# Patient Record
Sex: FEMALE | Race: WHITE | NOT HISPANIC OR LATINO | URBAN - METROPOLITAN AREA
[De-identification: names, ages, dates, MRNs, and addresses within clinical notes are randomized per-mention and may not be internally consistent; named-entity substitution may affect disease eponyms.]

---

## 2017-01-09 ENCOUNTER — COMMUNICATION - HEALTHEAST (OUTPATIENT)
Dept: INTERNAL MEDICINE | Facility: CLINIC | Age: 77
End: 2017-01-09

## 2017-01-09 DIAGNOSIS — F41.1 ANXIETY STATE: ICD-10-CM

## 2017-01-13 ENCOUNTER — COMMUNICATION - HEALTHEAST (OUTPATIENT)
Dept: INTERNAL MEDICINE | Facility: CLINIC | Age: 77
End: 2017-01-13

## 2017-01-13 DIAGNOSIS — R07.9 CHEST PAIN: ICD-10-CM

## 2017-01-26 ENCOUNTER — OFFICE VISIT - HEALTHEAST (OUTPATIENT)
Dept: INTERNAL MEDICINE | Facility: CLINIC | Age: 77
End: 2017-01-26

## 2017-01-26 DIAGNOSIS — F41.1 ANXIETY STATE: ICD-10-CM

## 2017-01-26 DIAGNOSIS — I89.0 LYMPHEDEMA: ICD-10-CM

## 2017-01-26 DIAGNOSIS — E11.9 CONTROLLED TYPE 2 DIABETES MELLITUS WITHOUT COMPLICATION, WITHOUT LONG-TERM CURRENT USE OF INSULIN (H): ICD-10-CM

## 2017-01-26 DIAGNOSIS — E78.00 PURE HYPERCHOLESTEROLEMIA: ICD-10-CM

## 2017-01-26 DIAGNOSIS — I21.4 NSTEMI (NON-ST ELEVATED MYOCARDIAL INFARCTION) (H): ICD-10-CM

## 2017-01-26 DIAGNOSIS — R10.9 INTERMITTENT ABDOMINAL PAIN: ICD-10-CM

## 2017-01-26 DIAGNOSIS — I10 ESSENTIAL HYPERTENSION WITH GOAL BLOOD PRESSURE LESS THAN 140/90: ICD-10-CM

## 2017-01-26 LAB — HBA1C MFR BLD: 6.6 % (ref 3.5–6)

## 2017-01-26 ASSESSMENT — MIFFLIN-ST. JEOR: SCORE: 1359.22

## 2017-01-31 ENCOUNTER — COMMUNICATION - HEALTHEAST (OUTPATIENT)
Dept: INTERNAL MEDICINE | Facility: CLINIC | Age: 77
End: 2017-01-31

## 2017-02-17 ENCOUNTER — COMMUNICATION - HEALTHEAST (OUTPATIENT)
Dept: INTERNAL MEDICINE | Facility: CLINIC | Age: 77
End: 2017-02-17

## 2017-02-17 DIAGNOSIS — F41.1 ANXIETY STATE: ICD-10-CM

## 2017-02-19 ENCOUNTER — COMMUNICATION - HEALTHEAST (OUTPATIENT)
Dept: INTERNAL MEDICINE | Facility: CLINIC | Age: 77
End: 2017-02-19

## 2017-02-19 DIAGNOSIS — F41.1 ANXIETY STATE: ICD-10-CM

## 2017-03-06 ENCOUNTER — COMMUNICATION - HEALTHEAST (OUTPATIENT)
Dept: INTENSIVE CARE | Facility: CLINIC | Age: 77
End: 2017-03-06

## 2017-03-06 DIAGNOSIS — I10 UNSPECIFIED ESSENTIAL HYPERTENSION: ICD-10-CM

## 2017-03-06 DIAGNOSIS — I21.4 NSTEMI (NON-ST ELEVATED MYOCARDIAL INFARCTION) (H): ICD-10-CM

## 2017-03-13 ENCOUNTER — OFFICE VISIT - HEALTHEAST (OUTPATIENT)
Dept: INTERNAL MEDICINE | Facility: CLINIC | Age: 77
End: 2017-03-13

## 2017-03-13 ENCOUNTER — COMMUNICATION - HEALTHEAST (OUTPATIENT)
Dept: SCHEDULING | Facility: CLINIC | Age: 77
End: 2017-03-13

## 2017-03-13 ENCOUNTER — COMMUNICATION - HEALTHEAST (OUTPATIENT)
Dept: INTERNAL MEDICINE | Facility: CLINIC | Age: 77
End: 2017-03-13

## 2017-03-13 DIAGNOSIS — R06.02 SOB (SHORTNESS OF BREATH): ICD-10-CM

## 2017-03-13 DIAGNOSIS — E11.9 CONTROLLED TYPE 2 DIABETES MELLITUS WITHOUT COMPLICATION, WITHOUT LONG-TERM CURRENT USE OF INSULIN (H): ICD-10-CM

## 2017-03-13 DIAGNOSIS — F41.1 ANXIETY STATE: ICD-10-CM

## 2017-03-14 LAB
ATRIAL RATE - MUSE: 79 BPM
DIASTOLIC BLOOD PRESSURE - MUSE: NORMAL MMHG
INTERPRETATION ECG - MUSE: NORMAL
P AXIS - MUSE: 51 DEGREES
PR INTERVAL - MUSE: 148 MS
QRS DURATION - MUSE: 76 MS
QT - MUSE: 362 MS
QTC - MUSE: 415 MS
R AXIS - MUSE: 39 DEGREES
SYSTOLIC BLOOD PRESSURE - MUSE: NORMAL MMHG
T AXIS - MUSE: 141 DEGREES
VENTRICULAR RATE- MUSE: 79 BPM

## 2017-03-17 ENCOUNTER — COMMUNICATION - HEALTHEAST (OUTPATIENT)
Dept: INTERNAL MEDICINE | Facility: CLINIC | Age: 77
End: 2017-03-17

## 2017-03-17 ENCOUNTER — COMMUNICATION - HEALTHEAST (OUTPATIENT)
Dept: SCHEDULING | Facility: CLINIC | Age: 77
End: 2017-03-17

## 2017-03-17 DIAGNOSIS — F41.9 ANXIETY: ICD-10-CM

## 2017-03-30 ENCOUNTER — COMMUNICATION - HEALTHEAST (OUTPATIENT)
Dept: INTERNAL MEDICINE | Facility: CLINIC | Age: 77
End: 2017-03-30

## 2017-03-30 DIAGNOSIS — E11.9 TYPE 2 DIABETES MELLITUS (H): ICD-10-CM

## 2017-04-11 ENCOUNTER — COMMUNICATION - HEALTHEAST (OUTPATIENT)
Dept: INTERNAL MEDICINE | Facility: CLINIC | Age: 77
End: 2017-04-11

## 2017-04-11 DIAGNOSIS — M54.9 BACK PAIN: ICD-10-CM

## 2017-04-19 ENCOUNTER — COMMUNICATION - HEALTHEAST (OUTPATIENT)
Dept: INTERNAL MEDICINE | Facility: CLINIC | Age: 77
End: 2017-04-19

## 2017-04-19 DIAGNOSIS — F41.1 ANXIETY STATE: ICD-10-CM

## 2017-05-13 ENCOUNTER — COMMUNICATION - HEALTHEAST (OUTPATIENT)
Dept: INTERNAL MEDICINE | Facility: CLINIC | Age: 77
End: 2017-05-13

## 2017-05-13 DIAGNOSIS — E87.6 HYPOKALEMIA DUE TO LOSS OF POTASSIUM: ICD-10-CM

## 2017-05-13 DIAGNOSIS — I10 UNSPECIFIED ESSENTIAL HYPERTENSION: ICD-10-CM

## 2017-05-25 ENCOUNTER — COMMUNICATION - HEALTHEAST (OUTPATIENT)
Dept: INTERNAL MEDICINE | Facility: CLINIC | Age: 77
End: 2017-05-25

## 2017-05-25 DIAGNOSIS — F41.1 ANXIETY STATE: ICD-10-CM

## 2017-06-09 ENCOUNTER — COMMUNICATION - HEALTHEAST (OUTPATIENT)
Dept: INTERNAL MEDICINE | Facility: CLINIC | Age: 77
End: 2017-06-09

## 2017-06-09 DIAGNOSIS — F41.9 ANXIETY: ICD-10-CM

## 2017-07-07 ENCOUNTER — COMMUNICATION - HEALTHEAST (OUTPATIENT)
Dept: INTERNAL MEDICINE | Facility: CLINIC | Age: 77
End: 2017-07-07

## 2017-07-07 ENCOUNTER — OFFICE VISIT - HEALTHEAST (OUTPATIENT)
Dept: INTERNAL MEDICINE | Facility: CLINIC | Age: 77
End: 2017-07-07

## 2017-07-07 DIAGNOSIS — M15.0 PRIMARY OSTEOARTHRITIS INVOLVING MULTIPLE JOINTS: ICD-10-CM

## 2017-07-07 DIAGNOSIS — R53.83 FATIGUE, UNSPECIFIED TYPE: ICD-10-CM

## 2017-07-07 DIAGNOSIS — E11.9 TYPE 2 DIABETES MELLITUS (H): ICD-10-CM

## 2017-07-07 DIAGNOSIS — B37.0 THRUSH OF MOUTH AND ESOPHAGUS (H): ICD-10-CM

## 2017-07-07 DIAGNOSIS — F41.1 ANXIETY STATE: ICD-10-CM

## 2017-07-07 DIAGNOSIS — B37.81 THRUSH OF MOUTH AND ESOPHAGUS (H): ICD-10-CM

## 2017-07-07 DIAGNOSIS — E11.9 CONTROLLED TYPE 2 DIABETES MELLITUS WITHOUT COMPLICATION, WITHOUT LONG-TERM CURRENT USE OF INSULIN (H): ICD-10-CM

## 2017-07-07 DIAGNOSIS — D64.9 ANEMIA, UNSPECIFIED: ICD-10-CM

## 2017-07-10 ENCOUNTER — COMMUNICATION - HEALTHEAST (OUTPATIENT)
Dept: INTERNAL MEDICINE | Facility: CLINIC | Age: 77
End: 2017-07-10

## 2017-07-10 LAB — HBA1C MFR BLD: 7.3 % (ref 3.5–6)

## 2017-07-11 ENCOUNTER — COMMUNICATION - HEALTHEAST (OUTPATIENT)
Dept: INTERNAL MEDICINE | Facility: CLINIC | Age: 77
End: 2017-07-11

## 2017-07-29 ASSESSMENT — MIFFLIN-ST. JEOR
SCORE: 1315.44
SCORE: 1302.97

## 2017-07-31 ENCOUNTER — SURGERY - HEALTHEAST (OUTPATIENT)
Dept: CARDIOLOGY | Facility: CLINIC | Age: 77
End: 2017-07-31

## 2017-08-01 ENCOUNTER — COMMUNICATION - HEALTHEAST (OUTPATIENT)
Dept: INTERNAL MEDICINE | Facility: CLINIC | Age: 77
End: 2017-08-01

## 2017-08-01 ASSESSMENT — MIFFLIN-ST. JEOR: SCORE: 1286.19

## 2017-08-03 ENCOUNTER — COMMUNICATION - HEALTHEAST (OUTPATIENT)
Dept: INTERNAL MEDICINE | Facility: CLINIC | Age: 77
End: 2017-08-03

## 2017-08-03 DIAGNOSIS — F41.1 ANXIETY STATE: ICD-10-CM

## 2017-08-08 ENCOUNTER — COMMUNICATION - HEALTHEAST (OUTPATIENT)
Dept: INTERNAL MEDICINE | Facility: CLINIC | Age: 77
End: 2017-08-08

## 2017-08-08 DIAGNOSIS — K21.00 GASTROESOPHAGEAL REFLUX DISEASE WITH ESOPHAGITIS: ICD-10-CM

## 2017-08-17 ENCOUNTER — OFFICE VISIT - HEALTHEAST (OUTPATIENT)
Dept: INTERNAL MEDICINE | Facility: CLINIC | Age: 77
End: 2017-08-17

## 2017-08-17 DIAGNOSIS — I25.84 CORONARY ATHEROSCLEROSIS DUE TO CALCIFIED CORONARY LESION: ICD-10-CM

## 2017-08-17 DIAGNOSIS — E11.9 CONTROLLED TYPE 2 DIABETES MELLITUS WITHOUT COMPLICATION, WITHOUT LONG-TERM CURRENT USE OF INSULIN (H): ICD-10-CM

## 2017-08-17 DIAGNOSIS — C64.2: ICD-10-CM

## 2017-08-17 DIAGNOSIS — R06.00 DYSPNEA, UNSPECIFIED TYPE: ICD-10-CM

## 2017-08-17 DIAGNOSIS — D50.0 IRON DEFICIENCY ANEMIA DUE TO CHRONIC BLOOD LOSS: ICD-10-CM

## 2017-08-17 DIAGNOSIS — I10 ESSENTIAL HYPERTENSION WITH GOAL BLOOD PRESSURE LESS THAN 140/90: ICD-10-CM

## 2017-08-17 DIAGNOSIS — K21.00 GASTROESOPHAGEAL REFLUX DISEASE WITH ESOPHAGITIS: ICD-10-CM

## 2017-08-17 DIAGNOSIS — I25.10 CORONARY ATHEROSCLEROSIS DUE TO CALCIFIED CORONARY LESION: ICD-10-CM

## 2017-08-17 DIAGNOSIS — E79.0 ELEVATED URIC ACID IN BLOOD: ICD-10-CM

## 2017-08-22 ENCOUNTER — COMMUNICATION - HEALTHEAST (OUTPATIENT)
Dept: INTERNAL MEDICINE | Facility: CLINIC | Age: 77
End: 2017-08-22

## 2017-08-31 ENCOUNTER — COMMUNICATION - HEALTHEAST (OUTPATIENT)
Dept: INTERNAL MEDICINE | Facility: CLINIC | Age: 77
End: 2017-08-31

## 2017-09-12 ENCOUNTER — COMMUNICATION - HEALTHEAST (OUTPATIENT)
Dept: INTERNAL MEDICINE | Facility: CLINIC | Age: 77
End: 2017-09-12

## 2017-09-12 ENCOUNTER — RECORDS - HEALTHEAST (OUTPATIENT)
Dept: ADMINISTRATIVE | Facility: OTHER | Age: 77
End: 2017-09-12

## 2017-09-13 ENCOUNTER — RECORDS - HEALTHEAST (OUTPATIENT)
Dept: ADMINISTRATIVE | Facility: OTHER | Age: 77
End: 2017-09-13

## 2017-09-14 ENCOUNTER — OFFICE VISIT - HEALTHEAST (OUTPATIENT)
Dept: INTERNAL MEDICINE | Facility: CLINIC | Age: 77
End: 2017-09-14

## 2017-09-14 DIAGNOSIS — Z00.00 HEALTH CARE MAINTENANCE: ICD-10-CM

## 2017-09-14 DIAGNOSIS — C64.2: ICD-10-CM

## 2017-09-14 DIAGNOSIS — E11.9 CONTROLLED TYPE 2 DIABETES MELLITUS WITHOUT COMPLICATION, WITHOUT LONG-TERM CURRENT USE OF INSULIN (H): ICD-10-CM

## 2017-09-14 DIAGNOSIS — K21.00 GASTROESOPHAGEAL REFLUX DISEASE WITH ESOPHAGITIS: ICD-10-CM

## 2017-09-14 DIAGNOSIS — I25.84 CORONARY ATHEROSCLEROSIS DUE TO CALCIFIED CORONARY LESION: ICD-10-CM

## 2017-09-14 DIAGNOSIS — I25.10 CORONARY ATHEROSCLEROSIS DUE TO CALCIFIED CORONARY LESION: ICD-10-CM

## 2017-09-14 DIAGNOSIS — Z01.818 PRE-OPERATIVE EXAMINATION FOR INTERNAL MEDICINE: ICD-10-CM

## 2017-09-14 ASSESSMENT — MIFFLIN-ST. JEOR: SCORE: 1329.39

## 2017-09-15 ENCOUNTER — COMMUNICATION - HEALTHEAST (OUTPATIENT)
Dept: INTERNAL MEDICINE | Facility: CLINIC | Age: 77
End: 2017-09-15

## 2017-09-19 ENCOUNTER — HOSPITAL ENCOUNTER (OUTPATIENT)
Dept: CT IMAGING | Facility: HOSPITAL | Age: 77
Discharge: HOME | End: 2017-09-19
Attending: UROLOGY

## 2017-09-19 ENCOUNTER — HOSPITAL ENCOUNTER (OUTPATIENT)
Dept: INTERVENTIONAL RADIOLOGY/VASCULAR | Facility: HOSPITAL | Age: 77
Discharge: HOME | End: 2017-09-19
Attending: UROLOGY

## 2017-09-19 DIAGNOSIS — N28.89 RENAL MASS: ICD-10-CM

## 2017-09-19 ASSESSMENT — MIFFLIN-ST. JEOR: SCORE: 1328.49

## 2017-09-20 LAB
LAB AP CHARGES (HE HISTORICAL CONVERSION): ABNORMAL
LAB AP INITIAL CYTO EVAL (HE HISTORICAL CONVERSION): ABNORMAL
LAB MED GENERAL PATH INTERP (HE HISTORICAL CONVERSION): ABNORMAL
PATH REPORT.COMMENTS IMP SPEC: ABNORMAL
PATH REPORT.COMMENTS IMP SPEC: ABNORMAL
PATH REPORT.FINAL DX SPEC: ABNORMAL
PATH REPORT.MICROSCOPIC SPEC OTHER STN: ABNORMAL
PATH REPORT.RELEVANT HX SPEC: ABNORMAL
SPECIMEN DESCRIPTION: ABNORMAL

## 2017-09-21 ENCOUNTER — COMMUNICATION - HEALTHEAST (OUTPATIENT)
Dept: INTERNAL MEDICINE | Facility: CLINIC | Age: 77
End: 2017-09-21

## 2017-09-21 DIAGNOSIS — F41.1 ANXIETY STATE: ICD-10-CM

## 2017-09-24 ENCOUNTER — COMMUNICATION - HEALTHEAST (OUTPATIENT)
Dept: INTERNAL MEDICINE | Facility: CLINIC | Age: 77
End: 2017-09-24

## 2017-09-24 DIAGNOSIS — E11.9 TYPE 2 DIABETES MELLITUS (H): ICD-10-CM

## 2017-09-29 ENCOUNTER — RECORDS - HEALTHEAST (OUTPATIENT)
Dept: ADMINISTRATIVE | Facility: OTHER | Age: 77
End: 2017-09-29

## 2017-10-03 ENCOUNTER — RECORDS - HEALTHEAST (OUTPATIENT)
Dept: ADMINISTRATIVE | Facility: OTHER | Age: 77
End: 2017-10-03

## 2017-10-03 ENCOUNTER — COMMUNICATION - HEALTHEAST (OUTPATIENT)
Dept: INTERNAL MEDICINE | Facility: CLINIC | Age: 77
End: 2017-10-03

## 2017-10-04 ENCOUNTER — RECORDS - HEALTHEAST (OUTPATIENT)
Dept: ADMINISTRATIVE | Facility: OTHER | Age: 77
End: 2017-10-04

## 2017-10-12 ENCOUNTER — COMMUNICATION - HEALTHEAST (OUTPATIENT)
Dept: INTERNAL MEDICINE | Facility: CLINIC | Age: 77
End: 2017-10-12

## 2017-10-13 ENCOUNTER — COMMUNICATION - HEALTHEAST (OUTPATIENT)
Dept: INTERNAL MEDICINE | Facility: CLINIC | Age: 77
End: 2017-10-13

## 2017-10-13 DIAGNOSIS — J45.909 ASTHMA: ICD-10-CM

## 2017-10-16 ENCOUNTER — COMMUNICATION - HEALTHEAST (OUTPATIENT)
Dept: INTERNAL MEDICINE | Facility: CLINIC | Age: 77
End: 2017-10-16

## 2017-10-24 ENCOUNTER — OFFICE VISIT - HEALTHEAST (OUTPATIENT)
Dept: INTERNAL MEDICINE | Facility: CLINIC | Age: 77
End: 2017-10-24

## 2017-10-24 DIAGNOSIS — C64.2 RENAL CELL CANCER, LEFT (H): ICD-10-CM

## 2017-10-24 DIAGNOSIS — E11.9 CONTROLLED TYPE 2 DIABETES MELLITUS WITHOUT COMPLICATION, WITHOUT LONG-TERM CURRENT USE OF INSULIN (H): ICD-10-CM

## 2017-10-24 LAB — HBA1C MFR BLD: 7.3 % (ref 3.5–6)

## 2017-10-24 ASSESSMENT — MIFFLIN-ST. JEOR: SCORE: 1319.7

## 2017-10-25 ENCOUNTER — COMMUNICATION - HEALTHEAST (OUTPATIENT)
Dept: INTERNAL MEDICINE | Facility: CLINIC | Age: 77
End: 2017-10-25

## 2017-11-05 ENCOUNTER — COMMUNICATION - HEALTHEAST (OUTPATIENT)
Dept: INTERNAL MEDICINE | Facility: CLINIC | Age: 77
End: 2017-11-05

## 2017-11-05 DIAGNOSIS — E11.9 TYPE 2 DIABETES MELLITUS (H): ICD-10-CM

## 2017-11-22 ENCOUNTER — COMMUNICATION - HEALTHEAST (OUTPATIENT)
Dept: INTERNAL MEDICINE | Facility: CLINIC | Age: 77
End: 2017-11-22

## 2017-11-28 ENCOUNTER — OFFICE VISIT - HEALTHEAST (OUTPATIENT)
Dept: INTERNAL MEDICINE | Facility: CLINIC | Age: 77
End: 2017-11-28
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
## 2017-11-28 DIAGNOSIS — S01.81XA LACERATION OF FOREHEAD: ICD-10-CM

## 2017-12-04 ENCOUNTER — COMMUNICATION - HEALTHEAST (OUTPATIENT)
Dept: INTERNAL MEDICINE | Facility: CLINIC | Age: 77
End: 2017-12-04

## 2017-12-06 ENCOUNTER — OFFICE VISIT - HEALTHEAST (OUTPATIENT)
Dept: INTERNAL MEDICINE | Facility: CLINIC | Age: 77
End: 2017-12-06

## 2017-12-06 DIAGNOSIS — E11.9 CONTROLLED TYPE 2 DIABETES MELLITUS WITHOUT COMPLICATION, WITHOUT LONG-TERM CURRENT USE OF INSULIN (H): ICD-10-CM

## 2017-12-06 DIAGNOSIS — S31.809A WOUND OF BUTTOCK, UNSPECIFIED LATERALITY, INITIAL ENCOUNTER: ICD-10-CM

## 2017-12-06 ASSESSMENT — MIFFLIN-ST. JEOR: SCORE: 1314.08

## 2017-12-13 ENCOUNTER — COMMUNICATION - HEALTHEAST (OUTPATIENT)
Dept: INTERNAL MEDICINE | Facility: CLINIC | Age: 77
End: 2017-12-13

## 2017-12-13 DIAGNOSIS — E87.6 HYPOKALEMIA DUE TO LOSS OF POTASSIUM: ICD-10-CM

## 2017-12-13 DIAGNOSIS — I10 ESSENTIAL HYPERTENSION: ICD-10-CM

## 2018-01-03 ENCOUNTER — HOSPITAL ENCOUNTER (OUTPATIENT)
Dept: CT IMAGING | Facility: CLINIC | Age: 78
Discharge: HOME | End: 2018-01-03
Attending: UROLOGY

## 2018-01-03 DIAGNOSIS — C64.2 MALIGNANT NEOPLASM OF LEFT KIDNEY, EXCEPT RENAL PELVIS (H): ICD-10-CM

## 2018-01-03 LAB
CREAT BLD-MCNC: 0.9 MG/DL
POC GFR AMER AF HE - HISTORICAL: >60 ML/MIN/1.73M2
POC GFR NON AMER AF HE - HISTORICAL: 60 ML/MIN/1.73M2

## 2018-01-06 ENCOUNTER — COMMUNICATION - HEALTHEAST (OUTPATIENT)
Dept: INTERNAL MEDICINE | Facility: CLINIC | Age: 78
End: 2018-01-06

## 2018-01-26 ENCOUNTER — COMMUNICATION - HEALTHEAST (OUTPATIENT)
Dept: INTERNAL MEDICINE | Facility: CLINIC | Age: 78
End: 2018-01-26

## 2018-01-30 ENCOUNTER — RECORDS - HEALTHEAST (OUTPATIENT)
Dept: ADMINISTRATIVE | Facility: OTHER | Age: 78
End: 2018-01-30

## 2018-02-07 ENCOUNTER — COMMUNICATION - HEALTHEAST (OUTPATIENT)
Dept: INTERNAL MEDICINE | Facility: CLINIC | Age: 78
End: 2018-02-07

## 2018-02-07 ENCOUNTER — RECORDS - HEALTHEAST (OUTPATIENT)
Dept: ADMINISTRATIVE | Facility: OTHER | Age: 78
End: 2018-02-07

## 2018-02-16 ENCOUNTER — COMMUNICATION - HEALTHEAST (OUTPATIENT)
Dept: INTERNAL MEDICINE | Facility: CLINIC | Age: 78
End: 2018-02-16

## 2018-03-02 ENCOUNTER — OFFICE VISIT - HEALTHEAST (OUTPATIENT)
Dept: INTERNAL MEDICINE | Facility: CLINIC | Age: 78
End: 2018-03-02

## 2018-03-02 DIAGNOSIS — D50.0 IRON DEFICIENCY ANEMIA DUE TO CHRONIC BLOOD LOSS: ICD-10-CM

## 2018-03-02 DIAGNOSIS — K21.00 GASTROESOPHAGEAL REFLUX DISEASE WITH ESOPHAGITIS: ICD-10-CM

## 2018-03-02 DIAGNOSIS — E11.9 CONTROLLED TYPE 2 DIABETES MELLITUS WITHOUT COMPLICATION, WITHOUT LONG-TERM CURRENT USE OF INSULIN (H): ICD-10-CM

## 2018-03-02 DIAGNOSIS — C64.2 RENAL CELL CANCER, LEFT (H): ICD-10-CM

## 2018-03-02 DIAGNOSIS — I25.84 CORONARY ATHEROSCLEROSIS DUE TO CALCIFIED CORONARY LESION: ICD-10-CM

## 2018-03-02 DIAGNOSIS — I10 ESSENTIAL HYPERTENSION WITH GOAL BLOOD PRESSURE LESS THAN 140/90: ICD-10-CM

## 2018-03-02 DIAGNOSIS — I25.10 CORONARY ATHEROSCLEROSIS DUE TO CALCIFIED CORONARY LESION: ICD-10-CM

## 2018-03-02 LAB
ALBUMIN SERPL-MCNC: 3.3 G/DL (ref 3.5–5)
ALP SERPL-CCNC: 135 U/L (ref 45–120)
ALT SERPL W P-5'-P-CCNC: 12 U/L (ref 0–45)
ANION GAP SERPL CALCULATED.3IONS-SCNC: 11 MMOL/L (ref 5–18)
AST SERPL W P-5'-P-CCNC: 11 U/L (ref 0–40)
BILIRUB SERPL-MCNC: 0.3 MG/DL (ref 0–1)
BUN SERPL-MCNC: 11 MG/DL (ref 8–28)
CALCIUM SERPL-MCNC: 9.2 MG/DL (ref 8.5–10.5)
CHLORIDE BLD-SCNC: 103 MMOL/L (ref 98–107)
CO2 SERPL-SCNC: 27 MMOL/L (ref 22–31)
CREAT SERPL-MCNC: 0.82 MG/DL (ref 0.6–1.1)
ERYTHROCYTE [DISTWIDTH] IN BLOOD BY AUTOMATED COUNT: 13.8 % (ref 11–14.5)
GFR SERPL CREATININE-BSD FRML MDRD: >60 ML/MIN/1.73M2
GLUCOSE BLD-MCNC: 188 MG/DL (ref 70–125)
HBA1C MFR BLD: 8.6 % (ref 3.5–6)
HCT VFR BLD AUTO: 40 % (ref 35–47)
HGB BLD-MCNC: 12.9 G/DL (ref 12–16)
MCH RBC QN AUTO: 27.5 PG (ref 27–34)
MCHC RBC AUTO-ENTMCNC: 32.4 G/DL (ref 32–36)
MCV RBC AUTO: 85 FL (ref 80–100)
PLATELET # BLD AUTO: 253 THOU/UL (ref 140–440)
PMV BLD AUTO: 7.3 FL (ref 7–10)
POTASSIUM BLD-SCNC: 4.1 MMOL/L (ref 3.5–5)
PROT SERPL-MCNC: 5.8 G/DL (ref 6–8)
RBC # BLD AUTO: 4.7 MILL/UL (ref 3.8–5.4)
SODIUM SERPL-SCNC: 141 MMOL/L (ref 136–145)
WBC: 7.5 THOU/UL (ref 4–11)

## 2018-03-06 ENCOUNTER — COMMUNICATION - HEALTHEAST (OUTPATIENT)
Dept: INTERNAL MEDICINE | Facility: CLINIC | Age: 78
End: 2018-03-06

## 2018-03-22 ENCOUNTER — COMMUNICATION - HEALTHEAST (OUTPATIENT)
Dept: INTERNAL MEDICINE | Facility: CLINIC | Age: 78
End: 2018-03-22

## 2018-04-09 ENCOUNTER — OFFICE VISIT - HEALTHEAST (OUTPATIENT)
Dept: NURSING | Facility: CLINIC | Age: 78
End: 2018-04-09

## 2018-04-09 DIAGNOSIS — E11.9 TYPE 2 DIABETES MELLITUS WITHOUT COMPLICATION, WITHOUT LONG-TERM CURRENT USE OF INSULIN (H): ICD-10-CM

## 2018-04-09 DIAGNOSIS — F41.1 ANXIETY STATE: ICD-10-CM

## 2018-04-09 DIAGNOSIS — I25.84 CORONARY ATHEROSCLEROSIS DUE TO CALCIFIED CORONARY LESION: ICD-10-CM

## 2018-04-09 DIAGNOSIS — Z00.00 PREVENTATIVE HEALTH CARE: ICD-10-CM

## 2018-04-09 DIAGNOSIS — M19.90 ARTHRITIS: ICD-10-CM

## 2018-04-09 DIAGNOSIS — D63.8 ANEMIA DUE TO CHRONIC ILLNESS: ICD-10-CM

## 2018-04-09 DIAGNOSIS — E79.0 ELEVATED URIC ACID IN BLOOD: ICD-10-CM

## 2018-04-09 DIAGNOSIS — K21.00 GASTROESOPHAGEAL REFLUX DISEASE WITH ESOPHAGITIS: ICD-10-CM

## 2018-04-09 DIAGNOSIS — E11.9 CONTROLLED TYPE 2 DIABETES MELLITUS WITHOUT COMPLICATION, WITHOUT LONG-TERM CURRENT USE OF INSULIN (H): ICD-10-CM

## 2018-04-09 DIAGNOSIS — J44.9 CHRONIC OBSTRUCTIVE PULMONARY DISEASE, UNSPECIFIED COPD TYPE (H): ICD-10-CM

## 2018-04-09 DIAGNOSIS — I25.10 CORONARY ATHEROSCLEROSIS DUE TO CALCIFIED CORONARY LESION: ICD-10-CM

## 2018-04-09 RX ORDER — IBUPROFEN 200 MG
1 CAPSULE ORAL 2 TIMES DAILY
Status: SHIPPED | COMMUNITY
Start: 2018-04-09

## 2018-04-12 ENCOUNTER — COMMUNICATION - HEALTHEAST (OUTPATIENT)
Dept: NURSING | Facility: CLINIC | Age: 78
End: 2018-04-12

## 2018-04-21 ENCOUNTER — COMMUNICATION - HEALTHEAST (OUTPATIENT)
Dept: INTERNAL MEDICINE | Facility: CLINIC | Age: 78
End: 2018-04-21

## 2018-04-26 ENCOUNTER — COMMUNICATION - HEALTHEAST (OUTPATIENT)
Dept: SCHEDULING | Facility: CLINIC | Age: 78
End: 2018-04-26

## 2018-04-28 ENCOUNTER — COMMUNICATION - HEALTHEAST (OUTPATIENT)
Dept: SCHEDULING | Facility: CLINIC | Age: 78
End: 2018-04-28

## 2018-04-30 ENCOUNTER — COMMUNICATION - HEALTHEAST (OUTPATIENT)
Dept: INTERNAL MEDICINE | Facility: CLINIC | Age: 78
End: 2018-04-30

## 2018-04-30 ENCOUNTER — COMMUNICATION - HEALTHEAST (OUTPATIENT)
Dept: SCHEDULING | Facility: CLINIC | Age: 78
End: 2018-04-30

## 2018-05-02 ENCOUNTER — OFFICE VISIT - HEALTHEAST (OUTPATIENT)
Dept: INTERNAL MEDICINE | Facility: CLINIC | Age: 78
End: 2018-05-02

## 2018-05-02 ENCOUNTER — COMMUNICATION - HEALTHEAST (OUTPATIENT)
Dept: INTERNAL MEDICINE | Facility: CLINIC | Age: 78
End: 2018-05-02

## 2018-05-02 DIAGNOSIS — J44.1 CHRONIC OBSTRUCTIVE PULMONARY DISEASE WITH ACUTE EXACERBATION (H): ICD-10-CM

## 2018-05-02 DIAGNOSIS — F41.1 ANXIETY STATE: ICD-10-CM

## 2018-05-02 DIAGNOSIS — E11.9 CONTROLLED TYPE 2 DIABETES MELLITUS WITHOUT COMPLICATION, WITHOUT LONG-TERM CURRENT USE OF INSULIN (H): ICD-10-CM

## 2018-05-02 DIAGNOSIS — I10 ESSENTIAL HYPERTENSION WITH GOAL BLOOD PRESSURE LESS THAN 140/90: ICD-10-CM

## 2018-05-02 LAB — HBA1C MFR BLD: 7.8 % (ref 3.5–6)

## 2018-05-03 ENCOUNTER — COMMUNICATION - HEALTHEAST (OUTPATIENT)
Dept: INTERNAL MEDICINE | Facility: CLINIC | Age: 78
End: 2018-05-03

## 2018-06-05 ENCOUNTER — COMMUNICATION - HEALTHEAST (OUTPATIENT)
Dept: INTERNAL MEDICINE | Facility: CLINIC | Age: 78
End: 2018-06-05

## 2018-06-05 DIAGNOSIS — J45.909 ASTHMA: ICD-10-CM

## 2018-06-08 ENCOUNTER — COMMUNICATION - HEALTHEAST (OUTPATIENT)
Dept: SCHEDULING | Facility: CLINIC | Age: 78
End: 2018-06-08

## 2018-06-12 ENCOUNTER — COMMUNICATION - HEALTHEAST (OUTPATIENT)
Dept: TELEHEALTH | Facility: CLINIC | Age: 78
End: 2018-06-12

## 2018-06-12 ENCOUNTER — COMMUNICATION - HEALTHEAST (OUTPATIENT)
Dept: CARE COORDINATION | Facility: CLINIC | Age: 78
End: 2018-06-12

## 2018-06-13 ENCOUNTER — OFFICE VISIT - HEALTHEAST (OUTPATIENT)
Dept: INTERNAL MEDICINE | Facility: CLINIC | Age: 78
End: 2018-06-13

## 2018-06-13 ENCOUNTER — COMMUNICATION - HEALTHEAST (OUTPATIENT)
Dept: INTERNAL MEDICINE | Facility: CLINIC | Age: 78
End: 2018-06-13

## 2018-06-13 DIAGNOSIS — E11.8 TYPE 2 DIABETES MELLITUS WITH COMPLICATION, WITHOUT LONG-TERM CURRENT USE OF INSULIN (H): ICD-10-CM

## 2018-06-13 DIAGNOSIS — F41.9 ANXIETY: ICD-10-CM

## 2018-06-13 DIAGNOSIS — I50.31 ACUTE DIASTOLIC HEART FAILURE (H): ICD-10-CM

## 2018-06-13 DIAGNOSIS — D63.8 ANEMIA DUE TO CHRONIC ILLNESS: ICD-10-CM

## 2018-06-13 DIAGNOSIS — I10 ESSENTIAL HYPERTENSION WITH GOAL BLOOD PRESSURE LESS THAN 140/90: ICD-10-CM

## 2018-06-13 DIAGNOSIS — I25.810 CAD OF AUTOLOGOUS BYPASS GRAFT: ICD-10-CM

## 2018-06-13 DIAGNOSIS — K21.00 GASTROESOPHAGEAL REFLUX DISEASE WITH ESOPHAGITIS: ICD-10-CM

## 2018-06-13 LAB
ANION GAP SERPL CALCULATED.3IONS-SCNC: 13 MMOL/L (ref 5–18)
BUN SERPL-MCNC: 26 MG/DL (ref 8–28)
CALCIUM SERPL-MCNC: 9.2 MG/DL (ref 8.5–10.5)
CHLORIDE BLD-SCNC: 101 MMOL/L (ref 98–107)
CO2 SERPL-SCNC: 28 MMOL/L (ref 22–31)
CREAT SERPL-MCNC: 1.05 MG/DL (ref 0.6–1.1)
ERYTHROCYTE [DISTWIDTH] IN BLOOD BY AUTOMATED COUNT: 13.2 % (ref 11–14.5)
GFR SERPL CREATININE-BSD FRML MDRD: 50 ML/MIN/1.73M2
GLUCOSE BLD-MCNC: 240 MG/DL (ref 70–125)
HCT VFR BLD AUTO: 37.9 % (ref 35–47)
HGB BLD-MCNC: 12.6 G/DL (ref 12–16)
MCH RBC QN AUTO: 29.1 PG (ref 27–34)
MCHC RBC AUTO-ENTMCNC: 33.4 G/DL (ref 32–36)
MCV RBC AUTO: 87 FL (ref 80–100)
PLATELET # BLD AUTO: 232 THOU/UL (ref 140–440)
PMV BLD AUTO: 7.7 FL (ref 7–10)
POTASSIUM BLD-SCNC: 4.3 MMOL/L (ref 3.5–5)
RBC # BLD AUTO: 4.34 MILL/UL (ref 3.8–5.4)
SODIUM SERPL-SCNC: 142 MMOL/L (ref 136–145)
WBC: 9.5 THOU/UL (ref 4–11)

## 2018-06-14 ENCOUNTER — COMMUNICATION - HEALTHEAST (OUTPATIENT)
Dept: INTERNAL MEDICINE | Facility: CLINIC | Age: 78
End: 2018-06-14

## 2018-06-18 ENCOUNTER — COMMUNICATION - HEALTHEAST (OUTPATIENT)
Dept: INTERNAL MEDICINE | Facility: CLINIC | Age: 78
End: 2018-06-18

## 2018-06-18 DIAGNOSIS — E11.9 DIABETES MELLITUS (H): ICD-10-CM

## 2018-06-18 RX ORDER — GLUCOSAMINE HCL/CHONDROITIN SU 500-400 MG
CAPSULE ORAL
Qty: 300 STRIP | Refills: 3 | Status: SHIPPED | OUTPATIENT
Start: 2018-06-18

## 2018-06-23 ENCOUNTER — COMMUNICATION - HEALTHEAST (OUTPATIENT)
Dept: INTERNAL MEDICINE | Facility: CLINIC | Age: 78
End: 2018-06-23

## 2018-07-11 ENCOUNTER — COMMUNICATION - HEALTHEAST (OUTPATIENT)
Dept: INTERNAL MEDICINE | Facility: CLINIC | Age: 78
End: 2018-07-11

## 2018-07-11 DIAGNOSIS — J45.909 ASTHMA: ICD-10-CM

## 2018-07-13 ENCOUNTER — OFFICE VISIT - HEALTHEAST (OUTPATIENT)
Dept: INTERNAL MEDICINE | Facility: CLINIC | Age: 78
End: 2018-07-13

## 2018-07-13 DIAGNOSIS — J45.40 MODERATE PERSISTENT ASTHMA WITHOUT COMPLICATION: ICD-10-CM

## 2018-07-13 DIAGNOSIS — F41.1 ANXIETY STATE: ICD-10-CM

## 2018-07-13 DIAGNOSIS — E11.8 TYPE 2 DIABETES MELLITUS WITH COMPLICATION, WITHOUT LONG-TERM CURRENT USE OF INSULIN (H): ICD-10-CM

## 2018-07-13 DIAGNOSIS — I10 ESSENTIAL HYPERTENSION WITH GOAL BLOOD PRESSURE LESS THAN 140/90: ICD-10-CM

## 2018-07-16 ENCOUNTER — RECORDS - HEALTHEAST (OUTPATIENT)
Dept: ADMINISTRATIVE | Facility: OTHER | Age: 78
End: 2018-07-16

## 2018-07-31 ENCOUNTER — OFFICE VISIT - HEALTHEAST (OUTPATIENT)
Dept: INTERNAL MEDICINE | Facility: CLINIC | Age: 78
End: 2018-07-31

## 2018-07-31 DIAGNOSIS — F41.1 ANXIETY STATE: ICD-10-CM

## 2018-07-31 DIAGNOSIS — N18.30 TYPE 2 DIABETES MELLITUS WITH STAGE 3 CHRONIC KIDNEY DISEASE, WITHOUT LONG-TERM CURRENT USE OF INSULIN (H): ICD-10-CM

## 2018-07-31 DIAGNOSIS — E11.22 TYPE 2 DIABETES MELLITUS WITH STAGE 3 CHRONIC KIDNEY DISEASE, WITHOUT LONG-TERM CURRENT USE OF INSULIN (H): ICD-10-CM

## 2018-07-31 DIAGNOSIS — F41.9 ANXIETY: ICD-10-CM

## 2018-07-31 DIAGNOSIS — I10 ESSENTIAL HYPERTENSION WITH GOAL BLOOD PRESSURE LESS THAN 140/90: ICD-10-CM

## 2018-08-02 ENCOUNTER — COMMUNICATION - HEALTHEAST (OUTPATIENT)
Dept: INTERNAL MEDICINE | Facility: CLINIC | Age: 78
End: 2018-08-02

## 2018-08-02 DIAGNOSIS — F41.9 ANXIETY: ICD-10-CM

## 2018-08-03 ENCOUNTER — COMMUNICATION - HEALTHEAST (OUTPATIENT)
Dept: SCHEDULING | Facility: CLINIC | Age: 78
End: 2018-08-03

## 2018-08-04 ENCOUNTER — COMMUNICATION - HEALTHEAST (OUTPATIENT)
Dept: SCHEDULING | Facility: CLINIC | Age: 78
End: 2018-08-04

## 2018-08-06 ENCOUNTER — COMMUNICATION - HEALTHEAST (OUTPATIENT)
Dept: INTERNAL MEDICINE | Facility: CLINIC | Age: 78
End: 2018-08-06

## 2018-08-08 ENCOUNTER — COMMUNICATION - HEALTHEAST (OUTPATIENT)
Dept: INTERNAL MEDICINE | Facility: CLINIC | Age: 78
End: 2018-08-08

## 2018-08-12 ENCOUNTER — COMMUNICATION - HEALTHEAST (OUTPATIENT)
Dept: INTERNAL MEDICINE | Facility: CLINIC | Age: 78
End: 2018-08-12

## 2018-08-13 ENCOUNTER — HOSPITAL ENCOUNTER (OUTPATIENT)
Dept: CT IMAGING | Facility: CLINIC | Age: 78
Discharge: HOME | End: 2018-08-13
Attending: UROLOGY

## 2018-08-13 DIAGNOSIS — C64.9 CANCER OF KIDNEY (H): ICD-10-CM

## 2018-08-16 ENCOUNTER — COMMUNICATION - HEALTHEAST (OUTPATIENT)
Dept: INTERNAL MEDICINE | Facility: CLINIC | Age: 78
End: 2018-08-16

## 2018-08-16 ENCOUNTER — OFFICE VISIT - HEALTHEAST (OUTPATIENT)
Dept: INTERNAL MEDICINE | Facility: CLINIC | Age: 78
End: 2018-08-16

## 2018-08-16 ENCOUNTER — OFFICE VISIT - HEALTHEAST (OUTPATIENT)
Dept: PHARMACY | Facility: CLINIC | Age: 78
End: 2018-08-16

## 2018-08-16 DIAGNOSIS — I10 ESSENTIAL HYPERTENSION WITH GOAL BLOOD PRESSURE LESS THAN 140/90: ICD-10-CM

## 2018-08-16 DIAGNOSIS — E11.22 TYPE 2 DIABETES MELLITUS WITH STAGE 3 CHRONIC KIDNEY DISEASE, WITHOUT LONG-TERM CURRENT USE OF INSULIN (H): ICD-10-CM

## 2018-08-16 DIAGNOSIS — E66.01 SEVERE OBESITY (BMI 35.0-35.9 WITH COMORBIDITY) (H): ICD-10-CM

## 2018-08-16 DIAGNOSIS — F41.1 ANXIETY STATE: ICD-10-CM

## 2018-08-16 DIAGNOSIS — Z00.00 ROUTINE HEALTH MAINTENANCE: ICD-10-CM

## 2018-08-16 DIAGNOSIS — J44.1 CHRONIC OBSTRUCTIVE PULMONARY DISEASE WITH ACUTE EXACERBATION (H): ICD-10-CM

## 2018-08-16 DIAGNOSIS — R19.7 DIARRHEA: ICD-10-CM

## 2018-08-16 DIAGNOSIS — N18.30 TYPE 2 DIABETES MELLITUS WITH STAGE 3 CHRONIC KIDNEY DISEASE, WITHOUT LONG-TERM CURRENT USE OF INSULIN (H): ICD-10-CM

## 2018-08-16 DIAGNOSIS — E79.0 ELEVATED URIC ACID IN BLOOD: ICD-10-CM

## 2018-08-16 DIAGNOSIS — F41.9 ANXIETY: ICD-10-CM

## 2018-08-16 DIAGNOSIS — R19.7 DIARRHEA, UNSPECIFIED TYPE: ICD-10-CM

## 2018-08-21 ENCOUNTER — RECORDS - HEALTHEAST (OUTPATIENT)
Dept: ADMINISTRATIVE | Facility: OTHER | Age: 78
End: 2018-08-21

## 2018-08-30 ENCOUNTER — OFFICE VISIT - HEALTHEAST (OUTPATIENT)
Dept: PHARMACY | Facility: CLINIC | Age: 78
End: 2018-08-30

## 2018-08-30 DIAGNOSIS — F41.1 ANXIETY STATE: ICD-10-CM

## 2018-09-13 ENCOUNTER — OFFICE VISIT - HEALTHEAST (OUTPATIENT)
Dept: PODIATRY | Facility: CLINIC | Age: 78
End: 2018-09-13

## 2018-09-13 DIAGNOSIS — B35.1 NAIL FUNGUS: ICD-10-CM

## 2018-09-13 DIAGNOSIS — L60.2 ONYCHAUXIS: ICD-10-CM

## 2018-09-13 DIAGNOSIS — E11.9 TYPE 2 DIABETES MELLITUS WITHOUT COMPLICATION, WITHOUT LONG-TERM CURRENT USE OF INSULIN (H): ICD-10-CM

## 2018-09-18 ENCOUNTER — OFFICE VISIT - HEALTHEAST (OUTPATIENT)
Dept: INTERNAL MEDICINE | Facility: CLINIC | Age: 78
End: 2018-09-18

## 2018-09-18 DIAGNOSIS — Z79.899 MEDICATION MANAGEMENT: ICD-10-CM

## 2018-09-18 DIAGNOSIS — N18.30 TYPE 2 DIABETES MELLITUS WITH STAGE 3 CHRONIC KIDNEY DISEASE, WITHOUT LONG-TERM CURRENT USE OF INSULIN (H): ICD-10-CM

## 2018-09-18 DIAGNOSIS — E11.22 TYPE 2 DIABETES MELLITUS WITH STAGE 3 CHRONIC KIDNEY DISEASE, WITHOUT LONG-TERM CURRENT USE OF INSULIN (H): ICD-10-CM

## 2018-09-18 DIAGNOSIS — F41.1 ANXIETY STATE: ICD-10-CM

## 2018-09-18 LAB
CREAT UR-MCNC: 15.8 MG/DL
HBA1C MFR BLD: 7.3 % (ref 3.5–6)
MICROALBUMIN UR-MCNC: 1.9 MG/DL (ref 0–1.99)
MICROALBUMIN/CREAT UR: 120.3 MG/G

## 2018-09-18 ASSESSMENT — MIFFLIN-ST. JEOR: SCORE: 1293.33

## 2018-09-27 ENCOUNTER — COMMUNICATION - HEALTHEAST (OUTPATIENT)
Dept: INTERNAL MEDICINE | Facility: CLINIC | Age: 78
End: 2018-09-27

## 2018-10-05 ENCOUNTER — COMMUNICATION - HEALTHEAST (OUTPATIENT)
Dept: SCHEDULING | Facility: CLINIC | Age: 78
End: 2018-10-05

## 2018-10-05 ENCOUNTER — OFFICE VISIT - HEALTHEAST (OUTPATIENT)
Dept: INTERNAL MEDICINE | Facility: CLINIC | Age: 78
End: 2018-10-05

## 2018-10-05 DIAGNOSIS — J41.1 MUCOPURULENT CHRONIC BRONCHITIS (H): ICD-10-CM

## 2018-10-05 ASSESSMENT — MIFFLIN-ST. JEOR: SCORE: 1310.91

## 2018-10-09 ENCOUNTER — COMMUNICATION - HEALTHEAST (OUTPATIENT)
Dept: SCHEDULING | Facility: CLINIC | Age: 78
End: 2018-10-09

## 2018-10-11 ENCOUNTER — HOME CARE/HOSPICE - HEALTHEAST (OUTPATIENT)
Dept: HOME HEALTH SERVICES | Facility: HOME HEALTH | Age: 78
End: 2018-10-11

## 2018-10-11 ENCOUNTER — AMBULATORY - HEALTHEAST (OUTPATIENT)
Dept: CARDIOLOGY | Facility: CLINIC | Age: 78
End: 2018-10-11

## 2018-10-12 ENCOUNTER — COMMUNICATION - HEALTHEAST (OUTPATIENT)
Dept: CARE COORDINATION | Facility: CLINIC | Age: 78
End: 2018-10-12

## 2018-10-13 ENCOUNTER — HOME CARE/HOSPICE - HEALTHEAST (OUTPATIENT)
Dept: HOME HEALTH SERVICES | Facility: HOME HEALTH | Age: 78
End: 2018-10-13

## 2018-10-13 ASSESSMENT — MIFFLIN-ST. JEOR: SCORE: 1251.94

## 2018-10-14 ENCOUNTER — HOME CARE/HOSPICE - HEALTHEAST (OUTPATIENT)
Dept: HOME HEALTH SERVICES | Facility: HOME HEALTH | Age: 78
End: 2018-10-14

## 2018-10-15 ENCOUNTER — COMMUNICATION - HEALTHEAST (OUTPATIENT)
Dept: HOME HEALTH SERVICES | Facility: HOME HEALTH | Age: 78
End: 2018-10-15

## 2018-10-15 ENCOUNTER — COMMUNICATION - HEALTHEAST (OUTPATIENT)
Dept: INTERNAL MEDICINE | Facility: CLINIC | Age: 78
End: 2018-10-15

## 2018-10-15 ENCOUNTER — HOME CARE/HOSPICE - HEALTHEAST (OUTPATIENT)
Dept: HOME HEALTH SERVICES | Facility: HOME HEALTH | Age: 78
End: 2018-10-15

## 2018-10-15 ENCOUNTER — OFFICE VISIT - HEALTHEAST (OUTPATIENT)
Dept: INTERNAL MEDICINE | Facility: CLINIC | Age: 78
End: 2018-10-15

## 2018-10-15 DIAGNOSIS — J81.0 ACUTE PULMONARY EDEMA (H): ICD-10-CM

## 2018-10-15 DIAGNOSIS — J18.9 PNEUMONIA OF RIGHT LOWER LOBE DUE TO INFECTIOUS ORGANISM: ICD-10-CM

## 2018-10-15 DIAGNOSIS — I50.42 CHRONIC COMBINED SYSTOLIC AND DIASTOLIC CONGESTIVE HEART FAILURE (H): ICD-10-CM

## 2018-10-15 DIAGNOSIS — E11.22 TYPE 2 DIABETES MELLITUS WITH STAGE 3 CHRONIC KIDNEY DISEASE, WITHOUT LONG-TERM CURRENT USE OF INSULIN (H): ICD-10-CM

## 2018-10-15 DIAGNOSIS — Z78.9 HEALTH MAINTENANCE ALTERATION: ICD-10-CM

## 2018-10-15 DIAGNOSIS — N18.30 TYPE 2 DIABETES MELLITUS WITH STAGE 3 CHRONIC KIDNEY DISEASE, WITHOUT LONG-TERM CURRENT USE OF INSULIN (H): ICD-10-CM

## 2018-10-15 DIAGNOSIS — F41.1 ANXIETY STATE: ICD-10-CM

## 2018-10-15 DIAGNOSIS — Z00.00 HEALTH CARE MAINTENANCE: ICD-10-CM

## 2018-10-15 LAB
BNP SERPL-MCNC: 264 PG/ML (ref 0–155)
ERYTHROCYTE [DISTWIDTH] IN BLOOD BY AUTOMATED COUNT: 12.9 % (ref 11–14.5)
HCT VFR BLD AUTO: 37.4 % (ref 35–47)
HGB BLD-MCNC: 12.3 G/DL (ref 12–16)
MCH RBC QN AUTO: 28.1 PG (ref 27–34)
MCHC RBC AUTO-ENTMCNC: 32.9 G/DL (ref 32–36)
MCV RBC AUTO: 86 FL (ref 80–100)
PLATELET # BLD AUTO: 349 THOU/UL (ref 140–440)
PMV BLD AUTO: 7.3 FL (ref 7–10)
RBC # BLD AUTO: 4.37 MILL/UL (ref 3.8–5.4)
WBC: 9.5 THOU/UL (ref 4–11)

## 2018-10-16 ENCOUNTER — COMMUNICATION - HEALTHEAST (OUTPATIENT)
Dept: PHARMACY | Facility: CLINIC | Age: 78
End: 2018-10-16

## 2018-10-16 DIAGNOSIS — I10 ESSENTIAL HYPERTENSION WITH GOAL BLOOD PRESSURE LESS THAN 140/90: ICD-10-CM

## 2018-10-16 DIAGNOSIS — I50.31 ACUTE DIASTOLIC CONGESTIVE HEART FAILURE (H): ICD-10-CM

## 2018-10-16 LAB
ANION GAP SERPL CALCULATED.3IONS-SCNC: 11 MMOL/L (ref 5–18)
BUN SERPL-MCNC: 15 MG/DL (ref 8–28)
CALCIUM SERPL-MCNC: 9.4 MG/DL (ref 8.5–10.5)
CHLORIDE BLD-SCNC: 99 MMOL/L (ref 98–107)
CO2 SERPL-SCNC: 31 MMOL/L (ref 22–31)
CREAT SERPL-MCNC: 1.03 MG/DL (ref 0.6–1.1)
GFR SERPL CREATININE-BSD FRML MDRD: 52 ML/MIN/1.73M2
GLUCOSE BLD-MCNC: 182 MG/DL (ref 70–125)
POTASSIUM BLD-SCNC: 4 MMOL/L (ref 3.5–5)
SODIUM SERPL-SCNC: 141 MMOL/L (ref 136–145)

## 2018-10-17 ENCOUNTER — COMMUNICATION - HEALTHEAST (OUTPATIENT)
Dept: HOME HEALTH SERVICES | Facility: HOME HEALTH | Age: 78
End: 2018-10-17

## 2018-10-17 ENCOUNTER — HOME CARE/HOSPICE - HEALTHEAST (OUTPATIENT)
Dept: HOME HEALTH SERVICES | Facility: HOME HEALTH | Age: 78
End: 2018-10-17

## 2018-10-19 ENCOUNTER — HOME CARE/HOSPICE - HEALTHEAST (OUTPATIENT)
Dept: HOME HEALTH SERVICES | Facility: HOME HEALTH | Age: 78
End: 2018-10-19

## 2018-10-21 ENCOUNTER — HOME CARE/HOSPICE - HEALTHEAST (OUTPATIENT)
Dept: HOME HEALTH SERVICES | Facility: HOME HEALTH | Age: 78
End: 2018-10-21

## 2018-10-22 ENCOUNTER — HOME CARE/HOSPICE - HEALTHEAST (OUTPATIENT)
Dept: HOME HEALTH SERVICES | Facility: HOME HEALTH | Age: 78
End: 2018-10-22

## 2018-10-24 ENCOUNTER — HOME CARE/HOSPICE - HEALTHEAST (OUTPATIENT)
Dept: HOME HEALTH SERVICES | Facility: HOME HEALTH | Age: 78
End: 2018-10-24

## 2018-10-25 ENCOUNTER — HOME CARE/HOSPICE - HEALTHEAST (OUTPATIENT)
Dept: HOME HEALTH SERVICES | Facility: HOME HEALTH | Age: 78
End: 2018-10-25

## 2018-10-25 ENCOUNTER — RECORDS - HEALTHEAST (OUTPATIENT)
Dept: ADMINISTRATIVE | Facility: OTHER | Age: 78
End: 2018-10-25

## 2018-10-26 ENCOUNTER — HOME CARE/HOSPICE - HEALTHEAST (OUTPATIENT)
Dept: HOME HEALTH SERVICES | Facility: HOME HEALTH | Age: 78
End: 2018-10-26

## 2018-10-29 ENCOUNTER — COMMUNICATION - HEALTHEAST (OUTPATIENT)
Dept: HOME HEALTH SERVICES | Facility: HOME HEALTH | Age: 78
End: 2018-10-29

## 2018-10-30 ENCOUNTER — HOME CARE/HOSPICE - HEALTHEAST (OUTPATIENT)
Dept: HOME HEALTH SERVICES | Facility: HOME HEALTH | Age: 78
End: 2018-10-30

## 2018-10-31 ENCOUNTER — HOME CARE/HOSPICE - HEALTHEAST (OUTPATIENT)
Dept: HOME HEALTH SERVICES | Facility: HOME HEALTH | Age: 78
End: 2018-10-31

## 2018-11-01 ENCOUNTER — HOME CARE/HOSPICE - HEALTHEAST (OUTPATIENT)
Dept: HOME HEALTH SERVICES | Facility: HOME HEALTH | Age: 78
End: 2018-11-01

## 2018-11-01 ENCOUNTER — COMMUNICATION - HEALTHEAST (OUTPATIENT)
Dept: INTERNAL MEDICINE | Facility: CLINIC | Age: 78
End: 2018-11-01

## 2018-11-02 ENCOUNTER — OFFICE VISIT - HEALTHEAST (OUTPATIENT)
Dept: INTERNAL MEDICINE | Facility: CLINIC | Age: 78
End: 2018-11-02

## 2018-11-02 DIAGNOSIS — E11.9 DIABETES MELLITUS, TYPE 2 (H): ICD-10-CM

## 2018-11-02 DIAGNOSIS — N18.30 TYPE 2 DIABETES MELLITUS WITH STAGE 3 CHRONIC KIDNEY DISEASE, WITHOUT LONG-TERM CURRENT USE OF INSULIN (H): ICD-10-CM

## 2018-11-02 DIAGNOSIS — E11.22 TYPE 2 DIABETES MELLITUS WITH STAGE 3 CHRONIC KIDNEY DISEASE, WITHOUT LONG-TERM CURRENT USE OF INSULIN (H): ICD-10-CM

## 2018-11-02 DIAGNOSIS — I10 ESSENTIAL HYPERTENSION: ICD-10-CM

## 2018-11-02 DIAGNOSIS — F41.1 ANXIETY STATE: ICD-10-CM

## 2018-11-05 ENCOUNTER — HOME CARE/HOSPICE - HEALTHEAST (OUTPATIENT)
Dept: HOME HEALTH SERVICES | Facility: HOME HEALTH | Age: 78
End: 2018-11-05

## 2018-11-06 ENCOUNTER — HOME CARE/HOSPICE - HEALTHEAST (OUTPATIENT)
Dept: HOME HEALTH SERVICES | Facility: HOME HEALTH | Age: 78
End: 2018-11-06

## 2018-11-07 ENCOUNTER — HOME CARE/HOSPICE - HEALTHEAST (OUTPATIENT)
Dept: HOME HEALTH SERVICES | Facility: HOME HEALTH | Age: 78
End: 2018-11-07

## 2018-11-07 ENCOUNTER — COMMUNICATION - HEALTHEAST (OUTPATIENT)
Dept: INTERNAL MEDICINE | Facility: CLINIC | Age: 78
End: 2018-11-07

## 2018-11-07 DIAGNOSIS — E87.6 HYPOKALEMIA DUE TO LOSS OF POTASSIUM: ICD-10-CM

## 2018-11-07 DIAGNOSIS — I10 ESSENTIAL HYPERTENSION: ICD-10-CM

## 2018-11-13 ENCOUNTER — HOME CARE/HOSPICE - HEALTHEAST (OUTPATIENT)
Dept: HOME HEALTH SERVICES | Facility: HOME HEALTH | Age: 78
End: 2018-11-13

## 2018-11-29 ENCOUNTER — COMMUNICATION - HEALTHEAST (OUTPATIENT)
Dept: INTERNAL MEDICINE | Facility: CLINIC | Age: 78
End: 2018-11-29

## 2018-11-29 DIAGNOSIS — F41.1 ANXIETY STATE: ICD-10-CM

## 2018-12-28 ENCOUNTER — OFFICE VISIT - HEALTHEAST (OUTPATIENT)
Dept: INTERNAL MEDICINE | Facility: CLINIC | Age: 78
End: 2018-12-28

## 2018-12-28 DIAGNOSIS — E11.22 TYPE 2 DIABETES MELLITUS WITH STAGE 3 CHRONIC KIDNEY DISEASE, WITHOUT LONG-TERM CURRENT USE OF INSULIN (H): ICD-10-CM

## 2018-12-28 DIAGNOSIS — F41.1 ANXIETY STATE: ICD-10-CM

## 2018-12-28 DIAGNOSIS — N18.30 TYPE 2 DIABETES MELLITUS WITH STAGE 3 CHRONIC KIDNEY DISEASE, WITHOUT LONG-TERM CURRENT USE OF INSULIN (H): ICD-10-CM

## 2018-12-28 DIAGNOSIS — I10 ESSENTIAL HYPERTENSION WITH GOAL BLOOD PRESSURE LESS THAN 140/90: ICD-10-CM

## 2018-12-28 DIAGNOSIS — Z51.81 ENCOUNTER FOR THERAPEUTIC DRUG LEVEL MONITORING: ICD-10-CM

## 2018-12-28 LAB
AMPHETAMINES UR QL SCN: NORMAL
ANION GAP SERPL CALCULATED.3IONS-SCNC: 10 MMOL/L (ref 5–18)
BARBITURATES UR QL: NORMAL
BENZODIAZ UR QL: NORMAL
BUN SERPL-MCNC: 24 MG/DL (ref 8–28)
CALCIUM SERPL-MCNC: 9.4 MG/DL (ref 8.5–10.5)
CANNABINOIDS UR QL SCN: NORMAL
CHLORIDE BLD-SCNC: 105 MMOL/L (ref 98–107)
CO2 SERPL-SCNC: 26 MMOL/L (ref 22–31)
COCAINE UR QL: NORMAL
CREAT SERPL-MCNC: 1.17 MG/DL (ref 0.6–1.1)
CREAT UR-MCNC: 10.9 MG/DL
GFR SERPL CREATININE-BSD FRML MDRD: 45 ML/MIN/1.73M2
GLUCOSE BLD-MCNC: 123 MG/DL (ref 70–125)
HBA1C MFR BLD: 6.3 % (ref 3.5–6)
OPIATES UR QL SCN: NORMAL
OXYCODONE UR QL: NORMAL
PCP UR QL SCN: NORMAL
POTASSIUM BLD-SCNC: 4 MMOL/L (ref 3.5–5)
SODIUM SERPL-SCNC: 141 MMOL/L (ref 136–145)

## 2018-12-28 ASSESSMENT — MIFFLIN-ST. JEOR: SCORE: 1270.54

## 2018-12-31 ENCOUNTER — COMMUNICATION - HEALTHEAST (OUTPATIENT)
Dept: INTERNAL MEDICINE | Facility: CLINIC | Age: 78
End: 2018-12-31

## 2019-01-03 ENCOUNTER — COMMUNICATION - HEALTHEAST (OUTPATIENT)
Dept: SCHEDULING | Facility: CLINIC | Age: 79
End: 2019-01-03

## 2019-01-22 ENCOUNTER — COMMUNICATION - HEALTHEAST (OUTPATIENT)
Dept: INTERNAL MEDICINE | Facility: CLINIC | Age: 79
End: 2019-01-22

## 2019-01-29 ENCOUNTER — COMMUNICATION - HEALTHEAST (OUTPATIENT)
Dept: INTERNAL MEDICINE | Facility: CLINIC | Age: 79
End: 2019-01-29

## 2019-01-29 DIAGNOSIS — F41.1 ANXIETY STATE: ICD-10-CM

## 2019-03-05 ENCOUNTER — COMMUNICATION - HEALTHEAST (OUTPATIENT)
Dept: INTERNAL MEDICINE | Facility: CLINIC | Age: 79
End: 2019-03-05

## 2019-03-08 ENCOUNTER — COMMUNICATION - HEALTHEAST (OUTPATIENT)
Dept: INTERNAL MEDICINE | Facility: CLINIC | Age: 79
End: 2019-03-08

## 2019-03-22 ENCOUNTER — COMMUNICATION - HEALTHEAST (OUTPATIENT)
Dept: INTERNAL MEDICINE | Facility: CLINIC | Age: 79
End: 2019-03-22

## 2019-03-22 DIAGNOSIS — I89.0 LYMPHEDEMA OF LEFT LOWER EXTREMITY: ICD-10-CM

## 2019-03-22 DIAGNOSIS — E78.00 PURE HYPERCHOLESTEROLEMIA: ICD-10-CM

## 2019-03-25 ENCOUNTER — OFFICE VISIT - HEALTHEAST (OUTPATIENT)
Dept: INTERNAL MEDICINE | Facility: CLINIC | Age: 79
End: 2019-03-25

## 2019-03-25 ENCOUNTER — COMMUNICATION - HEALTHEAST (OUTPATIENT)
Dept: INTERNAL MEDICINE | Facility: CLINIC | Age: 79
End: 2019-03-25

## 2019-03-25 ENCOUNTER — HOSPITAL ENCOUNTER (OUTPATIENT)
Dept: LAB | Age: 79
Setting detail: SPECIMEN
Discharge: HOME | End: 2019-03-25

## 2019-03-25 DIAGNOSIS — G62.9 NEUROPATHY: ICD-10-CM

## 2019-03-25 DIAGNOSIS — K90.9 DIARRHEA DUE TO MALABSORPTION: ICD-10-CM

## 2019-03-25 DIAGNOSIS — N18.30 TYPE 2 DIABETES MELLITUS WITH STAGE 3 CHRONIC KIDNEY DISEASE, WITHOUT LONG-TERM CURRENT USE OF INSULIN (H): ICD-10-CM

## 2019-03-25 DIAGNOSIS — R19.7 DIARRHEA DUE TO MALABSORPTION: ICD-10-CM

## 2019-03-25 DIAGNOSIS — D63.8 ANEMIA DUE TO CHRONIC ILLNESS: ICD-10-CM

## 2019-03-25 DIAGNOSIS — K21.9 GASTROESOPHAGEAL REFLUX DISEASE WITHOUT ESOPHAGITIS: ICD-10-CM

## 2019-03-25 DIAGNOSIS — E11.22 TYPE 2 DIABETES MELLITUS WITH STAGE 3 CHRONIC KIDNEY DISEASE, WITHOUT LONG-TERM CURRENT USE OF INSULIN (H): ICD-10-CM

## 2019-03-25 DIAGNOSIS — E11.9 DIABETES MELLITUS, TYPE 2 (H): ICD-10-CM

## 2019-03-25 LAB
ALBUMIN SERPL-MCNC: 3.8 G/DL (ref 3.5–5)
ALP SERPL-CCNC: 86 U/L (ref 45–120)
ALT SERPL W P-5'-P-CCNC: 9 U/L (ref 0–45)
ANION GAP SERPL CALCULATED.3IONS-SCNC: 13 MMOL/L (ref 5–18)
AST SERPL W P-5'-P-CCNC: 9 U/L (ref 0–40)
BILIRUB SERPL-MCNC: 0.3 MG/DL (ref 0–1)
BUN SERPL-MCNC: 27 MG/DL (ref 8–28)
CALCIUM SERPL-MCNC: 8.6 MG/DL (ref 8.5–10.5)
CHLORIDE BLD-SCNC: 110 MMOL/L (ref 98–107)
CO2 SERPL-SCNC: 23 MMOL/L (ref 22–31)
CREAT SERPL-MCNC: 1.24 MG/DL (ref 0.6–1.1)
ERYTHROCYTE [DISTWIDTH] IN BLOOD BY AUTOMATED COUNT: 12.8 % (ref 11–14.5)
FERRITIN SERPL-MCNC: 35 NG/ML (ref 10–130)
GFR SERPL CREATININE-BSD FRML MDRD: 42 ML/MIN/1.73M2
GLUCOSE BLD-MCNC: 62 MG/DL (ref 70–125)
HBA1C MFR BLD: 5.5 % (ref 3.5–6)
HCT VFR BLD AUTO: 38.8 % (ref 35–47)
HGB BLD-MCNC: 12.7 G/DL (ref 12–16)
IRON SATN MFR SERPL: 11 % (ref 20–50)
IRON SERPL-MCNC: 31 UG/DL (ref 42–175)
MCH RBC QN AUTO: 30.1 PG (ref 27–34)
MCHC RBC AUTO-ENTMCNC: 32.8 G/DL (ref 32–36)
MCV RBC AUTO: 92 FL (ref 80–100)
PLATELET # BLD AUTO: 282 THOU/UL (ref 140–440)
PMV BLD AUTO: 7.4 FL (ref 7–10)
POTASSIUM BLD-SCNC: 4.3 MMOL/L (ref 3.5–5)
PROT SERPL-MCNC: 5.7 G/DL (ref 6–8)
RBC # BLD AUTO: 4.22 MILL/UL (ref 3.8–5.4)
SODIUM SERPL-SCNC: 146 MMOL/L (ref 136–145)
TIBC SERPL-MCNC: 274 UG/DL (ref 313–563)
TRANSFERRIN SERPL-MCNC: 219 MG/DL (ref 212–360)
VIT B12 SERPL-MCNC: >2000 PG/ML (ref 213–816)
WBC: 8.8 THOU/UL (ref 4–11)

## 2019-03-26 LAB — ERYTHROCYTE [SEDIMENTATION RATE] IN BLOOD BY WESTERGREN METHOD: 7 MM/HR (ref 0–20)

## 2019-03-27 ENCOUNTER — COMMUNICATION - HEALTHEAST (OUTPATIENT)
Dept: INTERNAL MEDICINE | Facility: CLINIC | Age: 79
End: 2019-03-27

## 2019-03-28 ENCOUNTER — COMMUNICATION - HEALTHEAST (OUTPATIENT)
Dept: INTERNAL MEDICINE | Facility: CLINIC | Age: 79
End: 2019-03-28

## 2019-04-09 ENCOUNTER — COMMUNICATION - HEALTHEAST (OUTPATIENT)
Dept: INTERNAL MEDICINE | Facility: CLINIC | Age: 79
End: 2019-04-09

## 2019-04-30 ENCOUNTER — COMMUNICATION - HEALTHEAST (OUTPATIENT)
Dept: INTERNAL MEDICINE | Facility: CLINIC | Age: 79
End: 2019-04-30

## 2019-05-09 ENCOUNTER — COMMUNICATION - HEALTHEAST (OUTPATIENT)
Dept: INTERNAL MEDICINE | Facility: CLINIC | Age: 79
End: 2019-05-09

## 2019-05-09 DIAGNOSIS — F41.1 ANXIETY STATE: ICD-10-CM

## 2019-06-10 ENCOUNTER — OFFICE VISIT - HEALTHEAST (OUTPATIENT)
Dept: INTERNAL MEDICINE | Facility: CLINIC | Age: 79
End: 2019-06-10

## 2019-06-10 DIAGNOSIS — E11.22 TYPE 2 DIABETES MELLITUS WITH STAGE 3 CHRONIC KIDNEY DISEASE, WITHOUT LONG-TERM CURRENT USE OF INSULIN (H): ICD-10-CM

## 2019-06-10 DIAGNOSIS — E66.01 SEVERE OBESITY (BMI 35.0-35.9 WITH COMORBIDITY) (H): ICD-10-CM

## 2019-06-10 DIAGNOSIS — J41.1 MUCOPURULENT CHRONIC BRONCHITIS (H): ICD-10-CM

## 2019-06-10 DIAGNOSIS — C64.2 RENAL CELL CANCER, LEFT (H): ICD-10-CM

## 2019-06-10 DIAGNOSIS — I50.42 CHRONIC COMBINED SYSTOLIC AND DIASTOLIC CONGESTIVE HEART FAILURE (H): ICD-10-CM

## 2019-06-10 DIAGNOSIS — N18.30 TYPE 2 DIABETES MELLITUS WITH STAGE 3 CHRONIC KIDNEY DISEASE, WITHOUT LONG-TERM CURRENT USE OF INSULIN (H): ICD-10-CM

## 2019-06-10 DIAGNOSIS — F41.1 ANXIETY STATE: ICD-10-CM

## 2019-06-10 LAB
ANION GAP SERPL CALCULATED.3IONS-SCNC: 10 MMOL/L (ref 5–18)
BUN SERPL-MCNC: 22 MG/DL (ref 8–28)
CALCIUM SERPL-MCNC: 9.6 MG/DL (ref 8.5–10.5)
CHLORIDE BLD-SCNC: 106 MMOL/L (ref 98–107)
CO2 SERPL-SCNC: 28 MMOL/L (ref 22–31)
CREAT SERPL-MCNC: 1.15 MG/DL (ref 0.6–1.1)
ERYTHROCYTE [DISTWIDTH] IN BLOOD BY AUTOMATED COUNT: 12.5 % (ref 11–14.5)
GFR SERPL CREATININE-BSD FRML MDRD: 45 ML/MIN/1.73M2
GLUCOSE BLD-MCNC: 72 MG/DL (ref 70–125)
HBA1C MFR BLD: 6 % (ref 3.5–6)
HCT VFR BLD AUTO: 38.4 % (ref 35–47)
HGB BLD-MCNC: 12.6 G/DL (ref 12–16)
MCH RBC QN AUTO: 30.1 PG (ref 27–34)
MCHC RBC AUTO-ENTMCNC: 32.8 G/DL (ref 32–36)
MCV RBC AUTO: 92 FL (ref 80–100)
PLATELET # BLD AUTO: 270 THOU/UL (ref 140–440)
PMV BLD AUTO: 6.9 FL (ref 7–10)
POTASSIUM BLD-SCNC: 4.1 MMOL/L (ref 3.5–5)
RBC # BLD AUTO: 4.18 MILL/UL (ref 3.8–5.4)
SODIUM SERPL-SCNC: 144 MMOL/L (ref 136–145)
WBC: 7.9 THOU/UL (ref 4–11)

## 2019-06-10 ASSESSMENT — MIFFLIN-ST. JEOR: SCORE: 1248.54

## 2019-06-12 ENCOUNTER — COMMUNICATION - HEALTHEAST (OUTPATIENT)
Dept: INTERNAL MEDICINE | Facility: CLINIC | Age: 79
End: 2019-06-12

## 2019-06-24 ENCOUNTER — COMMUNICATION - HEALTHEAST (OUTPATIENT)
Dept: INTERNAL MEDICINE | Facility: CLINIC | Age: 79
End: 2019-06-24

## 2019-06-27 ENCOUNTER — COMMUNICATION - HEALTHEAST (OUTPATIENT)
Dept: SCHEDULING | Facility: CLINIC | Age: 79
End: 2019-06-27

## 2019-08-16 ENCOUNTER — COMMUNICATION - HEALTHEAST (OUTPATIENT)
Dept: INTERNAL MEDICINE | Facility: CLINIC | Age: 79
End: 2019-08-16

## 2019-08-16 DIAGNOSIS — E79.0 ELEVATED URIC ACID IN BLOOD: ICD-10-CM

## 2019-08-21 ENCOUNTER — COMMUNICATION - HEALTHEAST (OUTPATIENT)
Dept: INTERNAL MEDICINE | Facility: CLINIC | Age: 79
End: 2019-08-21

## 2019-08-21 DIAGNOSIS — F41.1 ANXIETY STATE: ICD-10-CM

## 2019-08-26 ENCOUNTER — AMBULATORY - HEALTHEAST (OUTPATIENT)
Dept: NURSING | Facility: CLINIC | Age: 79
End: 2019-08-26

## 2019-08-26 ENCOUNTER — OFFICE VISIT - HEALTHEAST (OUTPATIENT)
Dept: PHARMACY | Facility: CLINIC | Age: 79
End: 2019-08-26

## 2019-08-26 DIAGNOSIS — N18.30 TYPE 2 DIABETES MELLITUS WITH STAGE 3 CHRONIC KIDNEY DISEASE, WITHOUT LONG-TERM CURRENT USE OF INSULIN (H): ICD-10-CM

## 2019-08-26 DIAGNOSIS — J44.9 CHRONIC OBSTRUCTIVE PULMONARY DISEASE, UNSPECIFIED COPD TYPE (H): ICD-10-CM

## 2019-08-26 DIAGNOSIS — E79.0 ELEVATED URIC ACID IN BLOOD: ICD-10-CM

## 2019-08-26 DIAGNOSIS — F41.9 ANXIETY: ICD-10-CM

## 2019-08-26 DIAGNOSIS — I50.42 CHRONIC COMBINED SYSTOLIC AND DIASTOLIC CONGESTIVE HEART FAILURE (H): ICD-10-CM

## 2019-08-26 DIAGNOSIS — E11.22 TYPE 2 DIABETES MELLITUS WITH STAGE 3 CHRONIC KIDNEY DISEASE, WITHOUT LONG-TERM CURRENT USE OF INSULIN (H): ICD-10-CM

## 2019-08-26 RX ORDER — ALBUTEROL SULFATE 90 UG/1
2 AEROSOL, METERED RESPIRATORY (INHALATION) EVERY 6 HOURS PRN
Qty: 1 INHALER | Refills: 3 | Status: SHIPPED | OUTPATIENT
Start: 2019-08-26

## 2019-09-13 ENCOUNTER — COMMUNICATION - HEALTHEAST (OUTPATIENT)
Dept: INTERNAL MEDICINE | Facility: CLINIC | Age: 79
End: 2019-09-13

## 2019-09-13 DIAGNOSIS — F41.1 ANXIETY STATE: ICD-10-CM

## 2019-09-14 ENCOUNTER — COMMUNICATION - HEALTHEAST (OUTPATIENT)
Dept: INTERNAL MEDICINE | Facility: CLINIC | Age: 79
End: 2019-09-14

## 2019-09-14 DIAGNOSIS — E11.22 TYPE 2 DIABETES MELLITUS WITH STAGE 3 CHRONIC KIDNEY DISEASE, WITHOUT LONG-TERM CURRENT USE OF INSULIN (H): ICD-10-CM

## 2019-09-14 DIAGNOSIS — F41.9 ANXIETY: ICD-10-CM

## 2019-09-14 DIAGNOSIS — N18.30 TYPE 2 DIABETES MELLITUS WITH STAGE 3 CHRONIC KIDNEY DISEASE, WITHOUT LONG-TERM CURRENT USE OF INSULIN (H): ICD-10-CM

## 2019-11-06 ENCOUNTER — COMMUNICATION - HEALTHEAST (OUTPATIENT)
Dept: INTERNAL MEDICINE | Facility: CLINIC | Age: 79
End: 2019-11-06

## 2019-11-06 DIAGNOSIS — F41.1 ANXIETY STATE: ICD-10-CM

## 2019-11-06 DIAGNOSIS — E79.0 ELEVATED URIC ACID IN BLOOD: ICD-10-CM

## 2019-11-28 ENCOUNTER — COMMUNICATION - HEALTHEAST (OUTPATIENT)
Dept: INTERNAL MEDICINE | Facility: CLINIC | Age: 79
End: 2019-11-28

## 2019-11-28 DIAGNOSIS — F41.1 ANXIETY STATE: ICD-10-CM

## 2020-01-15 ENCOUNTER — COMMUNICATION - HEALTHEAST (OUTPATIENT)
Dept: INTERNAL MEDICINE | Facility: CLINIC | Age: 80
End: 2020-01-15

## 2020-01-15 DIAGNOSIS — F41.1 ANXIETY STATE: ICD-10-CM

## 2020-01-20 ENCOUNTER — COMMUNICATION - HEALTHEAST (OUTPATIENT)
Dept: INTERNAL MEDICINE | Facility: CLINIC | Age: 80
End: 2020-01-20

## 2020-01-20 DIAGNOSIS — I10 ESSENTIAL HYPERTENSION: ICD-10-CM

## 2020-01-20 DIAGNOSIS — K21.9 GASTROESOPHAGEAL REFLUX DISEASE WITHOUT ESOPHAGITIS: ICD-10-CM

## 2020-01-20 DIAGNOSIS — E87.6 HYPOKALEMIA DUE TO LOSS OF POTASSIUM: ICD-10-CM

## 2020-01-23 ENCOUNTER — OFFICE VISIT - HEALTHEAST (OUTPATIENT)
Dept: INTERNAL MEDICINE | Facility: CLINIC | Age: 80
End: 2020-01-23

## 2020-01-23 DIAGNOSIS — K21.9 GASTROESOPHAGEAL REFLUX DISEASE WITHOUT ESOPHAGITIS: ICD-10-CM

## 2020-01-23 DIAGNOSIS — N18.30 TYPE 2 DIABETES MELLITUS WITH STAGE 3 CHRONIC KIDNEY DISEASE, WITHOUT LONG-TERM CURRENT USE OF INSULIN (H): ICD-10-CM

## 2020-01-23 DIAGNOSIS — E78.00 PURE HYPERCHOLESTEROLEMIA: ICD-10-CM

## 2020-01-23 DIAGNOSIS — F41.1 ANXIETY STATE: ICD-10-CM

## 2020-01-23 DIAGNOSIS — Z79.899 MEDICATION MANAGEMENT: ICD-10-CM

## 2020-01-23 DIAGNOSIS — E79.0 ELEVATED URIC ACID IN BLOOD: ICD-10-CM

## 2020-01-23 DIAGNOSIS — I10 ESSENTIAL HYPERTENSION WITH GOAL BLOOD PRESSURE LESS THAN 140/90: ICD-10-CM

## 2020-01-23 DIAGNOSIS — E11.22 TYPE 2 DIABETES MELLITUS WITH STAGE 3 CHRONIC KIDNEY DISEASE, WITHOUT LONG-TERM CURRENT USE OF INSULIN (H): ICD-10-CM

## 2020-01-23 LAB
ALBUMIN SERPL-MCNC: 3.7 G/DL (ref 3.5–5)
ALP SERPL-CCNC: 105 U/L (ref 45–120)
ALT SERPL W P-5'-P-CCNC: 10 U/L (ref 0–45)
AMPHETAMINES UR QL SCN: NORMAL
ANION GAP SERPL CALCULATED.3IONS-SCNC: 10 MMOL/L (ref 5–18)
AST SERPL W P-5'-P-CCNC: 11 U/L (ref 0–40)
BARBITURATES UR QL: NORMAL
BENZODIAZ UR QL: NORMAL
BILIRUB SERPL-MCNC: 0.3 MG/DL (ref 0–1)
BUN SERPL-MCNC: 32 MG/DL (ref 8–28)
CALCIUM SERPL-MCNC: 9.2 MG/DL (ref 8.5–10.5)
CANNABINOIDS UR QL SCN: NORMAL
CHLORIDE BLD-SCNC: 105 MMOL/L (ref 98–107)
CHOLEST SERPL-MCNC: 180 MG/DL
CO2 SERPL-SCNC: 29 MMOL/L (ref 22–31)
COCAINE UR QL: NORMAL
CREAT SERPL-MCNC: 1.8 MG/DL (ref 0.6–1.1)
CREAT UR-MCNC: 143.5 MG/DL
FASTING STATUS PATIENT QL REPORTED: NO
GFR SERPL CREATININE-BSD FRML MDRD: 27 ML/MIN/1.73M2
GLUCOSE BLD-MCNC: 87 MG/DL (ref 70–125)
HBA1C MFR BLD: 6 % (ref 3.5–6)
HDLC SERPL-MCNC: 50 MG/DL
LDLC SERPL CALC-MCNC: 69 MG/DL
METHADONE UR QL SCN: NORMAL
OPIATES UR QL SCN: NORMAL
OXYCODONE UR QL: NORMAL
PCP UR QL SCN: NORMAL
POTASSIUM BLD-SCNC: 3.9 MMOL/L (ref 3.5–5)
PROT SERPL-MCNC: 5.9 G/DL (ref 6–8)
SODIUM SERPL-SCNC: 144 MMOL/L (ref 136–145)
TRIGL SERPL-MCNC: 307 MG/DL
URATE SERPL-MCNC: 5 MG/DL (ref 2–7.5)

## 2020-01-23 ASSESSMENT — MIFFLIN-ST. JEOR: SCORE: 1234.65

## 2020-01-24 ENCOUNTER — COMMUNICATION - HEALTHEAST (OUTPATIENT)
Dept: INTERNAL MEDICINE | Facility: CLINIC | Age: 80
End: 2020-01-24

## 2020-01-24 DIAGNOSIS — I89.0 LYMPHEDEMA OF LEFT LOWER EXTREMITY: ICD-10-CM

## 2020-02-10 ENCOUNTER — COMMUNICATION - HEALTHEAST (OUTPATIENT)
Dept: INTERNAL MEDICINE | Facility: CLINIC | Age: 80
End: 2020-02-10

## 2020-02-10 DIAGNOSIS — I10 ESSENTIAL HYPERTENSION: ICD-10-CM

## 2020-02-27 ENCOUNTER — COMMUNICATION - HEALTHEAST (OUTPATIENT)
Dept: INTERNAL MEDICINE | Facility: CLINIC | Age: 80
End: 2020-02-27

## 2020-02-27 DIAGNOSIS — F41.1 ANXIETY STATE: ICD-10-CM

## 2020-03-17 ENCOUNTER — ANESTHESIA - HEALTHEAST (OUTPATIENT)
Dept: SURGERY | Facility: CLINIC | Age: 80
End: 2020-03-17

## 2020-03-17 ENCOUNTER — SURGERY - HEALTHEAST (OUTPATIENT)
Dept: SURGERY | Facility: CLINIC | Age: 80
End: 2020-03-17

## 2020-03-17 ASSESSMENT — MIFFLIN-ST. JEOR
SCORE: 1273.49
SCORE: 1273.49

## 2020-03-18 ASSESSMENT — MIFFLIN-ST. JEOR: SCORE: 1273.49

## 2020-03-19 ASSESSMENT — MIFFLIN-ST. JEOR: SCORE: 1292.99

## 2020-03-20 ASSESSMENT — MIFFLIN-ST. JEOR: SCORE: 1300.7

## 2020-03-25 ENCOUNTER — RECORDS - HEALTHEAST (OUTPATIENT)
Dept: LAB | Facility: CLINIC | Age: 80
End: 2020-03-25

## 2020-03-26 LAB
ANION GAP SERPL CALCULATED.3IONS-SCNC: 9 MMOL/L (ref 5–18)
BUN SERPL-MCNC: 29 MG/DL (ref 8–28)
CALCIUM SERPL-MCNC: 8.3 MG/DL (ref 8.5–10.5)
CHLORIDE BLD-SCNC: 108 MMOL/L (ref 98–107)
CO2 SERPL-SCNC: 25 MMOL/L (ref 22–31)
CREAT SERPL-MCNC: 1.34 MG/DL (ref 0.6–1.1)
GFR SERPL CREATININE-BSD FRML MDRD: 38 ML/MIN/1.73M2
GLUCOSE BLD-MCNC: 130 MG/DL (ref 70–125)
HGB BLD-MCNC: 7.5 G/DL (ref 12–16)
POTASSIUM BLD-SCNC: 4.8 MMOL/L (ref 3.5–5)
SODIUM SERPL-SCNC: 142 MMOL/L (ref 136–145)

## 2020-04-18 ENCOUNTER — COMMUNICATION - HEALTHEAST (OUTPATIENT)
Dept: INTERNAL MEDICINE | Facility: CLINIC | Age: 80
End: 2020-04-18

## 2020-04-18 DIAGNOSIS — E78.00 PURE HYPERCHOLESTEROLEMIA: ICD-10-CM

## 2020-04-18 DIAGNOSIS — E11.9 DIABETES MELLITUS, TYPE 2 (H): ICD-10-CM

## 2020-04-21 ENCOUNTER — COMMUNICATION - HEALTHEAST (OUTPATIENT)
Dept: SCHEDULING | Facility: CLINIC | Age: 80
End: 2020-04-21

## 2020-04-22 ENCOUNTER — OFFICE VISIT - HEALTHEAST (OUTPATIENT)
Dept: INTERNAL MEDICINE | Facility: CLINIC | Age: 80
End: 2020-04-22

## 2020-04-22 ENCOUNTER — COMMUNICATION - HEALTHEAST (OUTPATIENT)
Dept: SCHEDULING | Facility: CLINIC | Age: 80
End: 2020-04-22

## 2020-04-22 ENCOUNTER — RECORDS - HEALTHEAST (OUTPATIENT)
Dept: ADMINISTRATIVE | Facility: OTHER | Age: 80
End: 2020-04-22

## 2020-04-22 DIAGNOSIS — N18.30 TYPE 2 DIABETES MELLITUS WITH STAGE 3 CHRONIC KIDNEY DISEASE, WITHOUT LONG-TERM CURRENT USE OF INSULIN (H): ICD-10-CM

## 2020-04-22 DIAGNOSIS — J20.8 ACUTE BRONCHITIS DUE TO OTHER SPECIFIED ORGANISMS: ICD-10-CM

## 2020-04-22 DIAGNOSIS — E11.22 TYPE 2 DIABETES MELLITUS WITH STAGE 3 CHRONIC KIDNEY DISEASE, WITHOUT LONG-TERM CURRENT USE OF INSULIN (H): ICD-10-CM

## 2020-04-22 DIAGNOSIS — S72.141A CLOSED INTERTROCHANTERIC FRACTURE, RIGHT, INITIAL ENCOUNTER (H): ICD-10-CM

## 2020-04-22 DIAGNOSIS — I10 ESSENTIAL HYPERTENSION WITH GOAL BLOOD PRESSURE LESS THAN 140/90: ICD-10-CM

## 2020-04-22 DIAGNOSIS — J44.9 CHRONIC OBSTRUCTIVE PULMONARY DISEASE, UNSPECIFIED COPD TYPE (H): ICD-10-CM

## 2020-04-23 ENCOUNTER — COMMUNICATION - HEALTHEAST (OUTPATIENT)
Dept: SCHEDULING | Facility: CLINIC | Age: 80
End: 2020-04-23

## 2020-04-25 ENCOUNTER — RECORDS - HEALTHEAST (OUTPATIENT)
Dept: ADMINISTRATIVE | Facility: OTHER | Age: 80
End: 2020-04-25

## 2020-05-15 ENCOUNTER — RECORDS - HEALTHEAST (OUTPATIENT)
Dept: ADMINISTRATIVE | Facility: OTHER | Age: 80
End: 2020-05-15

## 2020-08-20 ENCOUNTER — COMMUNICATION - HEALTHEAST (OUTPATIENT)
Dept: INTERNAL MEDICINE | Facility: CLINIC | Age: 80
End: 2020-08-20

## 2020-08-20 DIAGNOSIS — F41.1 ANXIETY STATE: ICD-10-CM

## 2020-08-29 ENCOUNTER — COMMUNICATION - HEALTHEAST (OUTPATIENT)
Dept: INTERNAL MEDICINE | Facility: CLINIC | Age: 80
End: 2020-08-29

## 2020-08-29 DIAGNOSIS — F41.1 ANXIETY STATE: ICD-10-CM

## 2020-08-30 RX ORDER — ARIPIPRAZOLE 2 MG/1
TABLET ORAL
Qty: 90 TABLET | Refills: 3 | Status: SHIPPED | OUTPATIENT
Start: 2020-08-30

## 2020-10-25 ENCOUNTER — COMMUNICATION - HEALTHEAST (OUTPATIENT)
Dept: SCHEDULING | Facility: CLINIC | Age: 80
End: 2020-10-25

## 2020-10-25 DIAGNOSIS — F41.9 ANXIETY: ICD-10-CM

## 2020-11-03 ENCOUNTER — COMMUNICATION - HEALTHEAST (OUTPATIENT)
Dept: SCHEDULING | Facility: CLINIC | Age: 80
End: 2020-11-03

## 2020-11-03 ENCOUNTER — OFFICE VISIT - HEALTHEAST (OUTPATIENT)
Dept: FAMILY MEDICINE | Facility: CLINIC | Age: 80
End: 2020-11-03

## 2020-11-03 DIAGNOSIS — N18.30 TYPE 2 DIABETES MELLITUS WITH STAGE 3 CHRONIC KIDNEY DISEASE, WITHOUT LONG-TERM CURRENT USE OF INSULIN, UNSPECIFIED WHETHER STAGE 3A OR 3B CKD (H): ICD-10-CM

## 2020-11-03 DIAGNOSIS — E11.22 TYPE 2 DIABETES MELLITUS WITH STAGE 3 CHRONIC KIDNEY DISEASE, WITHOUT LONG-TERM CURRENT USE OF INSULIN, UNSPECIFIED WHETHER STAGE 3A OR 3B CKD (H): ICD-10-CM

## 2020-11-03 DIAGNOSIS — I25.84 CORONARY ATHEROSCLEROSIS DUE TO CALCIFIED CORONARY LESION: ICD-10-CM

## 2020-11-03 DIAGNOSIS — I25.10 CORONARY ATHEROSCLEROSIS DUE TO CALCIFIED CORONARY LESION: ICD-10-CM

## 2020-11-03 DIAGNOSIS — L89.302 PRESSURE INJURY OF BUTTOCK, STAGE 2, UNSPECIFIED LATERALITY (H): ICD-10-CM

## 2020-11-03 RX ORDER — SILVER SULFADIAZINE 10 MG/G
CREAM TOPICAL
Qty: 50 G | Refills: 1 | Status: SHIPPED | OUTPATIENT
Start: 2020-11-03

## 2020-11-08 ENCOUNTER — COMMUNICATION - HEALTHEAST (OUTPATIENT)
Dept: INTERNAL MEDICINE | Facility: CLINIC | Age: 80
End: 2020-11-08

## 2020-11-08 DIAGNOSIS — N18.30 TYPE 2 DIABETES MELLITUS WITH STAGE 3 CHRONIC KIDNEY DISEASE, WITHOUT LONG-TERM CURRENT USE OF INSULIN (H): ICD-10-CM

## 2020-11-08 DIAGNOSIS — E11.22 TYPE 2 DIABETES MELLITUS WITH STAGE 3 CHRONIC KIDNEY DISEASE, WITHOUT LONG-TERM CURRENT USE OF INSULIN (H): ICD-10-CM

## 2020-11-09 RX ORDER — GLIPIZIDE 5 MG/1
5 TABLET ORAL DAILY
Qty: 90 TABLET | Refills: 3 | Status: SHIPPED | OUTPATIENT
Start: 2020-11-09

## 2020-11-20 ENCOUNTER — OFFICE VISIT - HEALTHEAST (OUTPATIENT)
Dept: INTERNAL MEDICINE | Facility: CLINIC | Age: 80
End: 2020-11-20

## 2020-11-20 DIAGNOSIS — I10 ESSENTIAL HYPERTENSION WITH GOAL BLOOD PRESSURE LESS THAN 140/90: ICD-10-CM

## 2020-11-20 DIAGNOSIS — E11.22 TYPE 2 DIABETES MELLITUS WITH STAGE 3B CHRONIC KIDNEY DISEASE, WITHOUT LONG-TERM CURRENT USE OF INSULIN (H): ICD-10-CM

## 2020-11-20 DIAGNOSIS — N18.32 TYPE 2 DIABETES MELLITUS WITH STAGE 3B CHRONIC KIDNEY DISEASE, WITHOUT LONG-TERM CURRENT USE OF INSULIN (H): ICD-10-CM

## 2020-11-20 DIAGNOSIS — I50.42 CHRONIC COMBINED SYSTOLIC AND DIASTOLIC CONGESTIVE HEART FAILURE (H): ICD-10-CM

## 2020-11-20 DIAGNOSIS — N18.32 STAGE 3B CHRONIC KIDNEY DISEASE (H): ICD-10-CM

## 2020-11-20 DIAGNOSIS — E55.9 VITAMIN D DEFICIENCY: ICD-10-CM

## 2020-11-20 DIAGNOSIS — S72.001D CLOSED FRACTURE OF RIGHT HIP WITH ROUTINE HEALING: ICD-10-CM

## 2020-11-20 DIAGNOSIS — C64.2 RENAL CELL CANCER, LEFT (H): ICD-10-CM

## 2020-11-20 DIAGNOSIS — E66.01 SEVERE OBESITY (BMI 35.0-35.9 WITH COMORBIDITY) (H): ICD-10-CM

## 2020-11-20 DIAGNOSIS — I25.10 ATHEROSCLEROSIS OF NATIVE CORONARY ARTERY OF NATIVE HEART WITHOUT ANGINA PECTORIS: ICD-10-CM

## 2020-11-20 DIAGNOSIS — E79.0 ELEVATED URIC ACID IN BLOOD: ICD-10-CM

## 2020-11-20 DIAGNOSIS — D62 ANEMIA DUE TO BLOOD LOSS, ACUTE: ICD-10-CM

## 2020-11-20 LAB
ALBUMIN SERPL-MCNC: 3.6 G/DL (ref 3.5–5)
ALP SERPL-CCNC: 114 U/L (ref 45–120)
ALT SERPL W P-5'-P-CCNC: 15 U/L (ref 0–45)
ANION GAP SERPL CALCULATED.3IONS-SCNC: 10 MMOL/L (ref 5–18)
AST SERPL W P-5'-P-CCNC: 14 U/L (ref 0–40)
BILIRUB SERPL-MCNC: 0.4 MG/DL (ref 0–1)
BUN SERPL-MCNC: 29 MG/DL (ref 8–28)
CALCIUM SERPL-MCNC: 8.9 MG/DL (ref 8.5–10.5)
CHLORIDE BLD-SCNC: 107 MMOL/L (ref 98–107)
CHOLEST SERPL-MCNC: 165 MG/DL
CO2 SERPL-SCNC: 26 MMOL/L (ref 22–31)
CREAT SERPL-MCNC: 1.44 MG/DL (ref 0.6–1.1)
CREAT UR-MCNC: 23.3 MG/DL
ERYTHROCYTE [DISTWIDTH] IN BLOOD BY AUTOMATED COUNT: 14 % (ref 11–14.5)
FASTING STATUS PATIENT QL REPORTED: YES
GFR SERPL CREATININE-BSD FRML MDRD: 35 ML/MIN/1.73M2
GLUCOSE BLD-MCNC: 134 MG/DL (ref 70–125)
HBA1C MFR BLD: 6.4 %
HCT VFR BLD AUTO: 38.7 % (ref 35–47)
HDLC SERPL-MCNC: 52 MG/DL
HGB BLD-MCNC: 12.5 G/DL (ref 12–16)
LDLC SERPL CALC-MCNC: 88 MG/DL
MCH RBC QN AUTO: 29 PG (ref 27–34)
MCHC RBC AUTO-ENTMCNC: 32.2 G/DL (ref 32–36)
MCV RBC AUTO: 90 FL (ref 80–100)
MICROALBUMIN UR-MCNC: 3.6 MG/DL (ref 0–1.99)
MICROALBUMIN/CREAT UR: 154.5 MG/G
PLATELET # BLD AUTO: 265 THOU/UL (ref 140–440)
PMV BLD AUTO: 7.6 FL (ref 7–10)
POTASSIUM BLD-SCNC: 4.6 MMOL/L (ref 3.5–5)
PROT SERPL-MCNC: 5.4 G/DL (ref 6–8)
RBC # BLD AUTO: 4.3 MILL/UL (ref 3.8–5.4)
SODIUM SERPL-SCNC: 143 MMOL/L (ref 136–145)
TRIGL SERPL-MCNC: 125 MG/DL
URATE SERPL-MCNC: 2.8 MG/DL (ref 2–7.5)
WBC: 8.2 THOU/UL (ref 4–11)

## 2020-11-20 RX ORDER — NITROGLYCERIN 0.4 MG/1
0.4 TABLET SUBLINGUAL EVERY 5 MIN PRN
Qty: 20 TABLET | Refills: 3 | Status: SHIPPED | OUTPATIENT
Start: 2020-11-20

## 2020-11-20 ASSESSMENT — MIFFLIN-ST. JEOR: SCORE: 1191.84

## 2020-11-23 ENCOUNTER — COMMUNICATION - HEALTHEAST (OUTPATIENT)
Dept: INTERNAL MEDICINE | Facility: CLINIC | Age: 80
End: 2020-11-23

## 2020-11-23 LAB — 25(OH)D3 SERPL-MCNC: 32.2 NG/ML (ref 30–80)

## 2021-01-02 ENCOUNTER — COMMUNICATION - HEALTHEAST (OUTPATIENT)
Dept: INTERNAL MEDICINE | Facility: CLINIC | Age: 81
End: 2021-01-02

## 2021-01-02 DIAGNOSIS — E79.0 ELEVATED URIC ACID IN BLOOD: ICD-10-CM

## 2021-01-04 RX ORDER — ALLOPURINOL 300 MG/1
TABLET ORAL
Qty: 90 TABLET | Refills: 3 | Status: SHIPPED | OUTPATIENT
Start: 2021-01-04

## 2021-01-08 ENCOUNTER — COMMUNICATION - HEALTHEAST (OUTPATIENT)
Dept: INTERNAL MEDICINE | Facility: CLINIC | Age: 81
End: 2021-01-08

## 2021-01-08 DIAGNOSIS — I10 ESSENTIAL HYPERTENSION: ICD-10-CM

## 2021-01-08 DIAGNOSIS — E87.6 HYPOKALEMIA DUE TO LOSS OF POTASSIUM: ICD-10-CM

## 2021-01-08 RX ORDER — POTASSIUM CHLORIDE 1500 MG/1
TABLET, EXTENDED RELEASE ORAL
Qty: 90 TABLET | Refills: 2 | Status: SHIPPED | OUTPATIENT
Start: 2021-01-08 | End: 2021-12-08

## 2021-02-02 ENCOUNTER — COMMUNICATION - HEALTHEAST (OUTPATIENT)
Dept: NURSING | Facility: CLINIC | Age: 81
End: 2021-02-02

## 2021-02-02 DIAGNOSIS — F41.1 ANXIETY STATE: ICD-10-CM

## 2021-02-02 RX ORDER — LORAZEPAM 1 MG/1
1 TABLET ORAL DAILY PRN
Qty: 30 TABLET | Refills: 0 | Status: SHIPPED | OUTPATIENT
Start: 2021-02-02

## 2021-02-05 ENCOUNTER — COMMUNICATION - HEALTHEAST (OUTPATIENT)
Dept: INTERNAL MEDICINE | Facility: CLINIC | Age: 81
End: 2021-02-05

## 2021-02-21 ENCOUNTER — COMMUNICATION - HEALTHEAST (OUTPATIENT)
Dept: INTERNAL MEDICINE | Facility: CLINIC | Age: 81
End: 2021-02-21

## 2021-02-21 DIAGNOSIS — I10 ESSENTIAL HYPERTENSION: ICD-10-CM

## 2021-02-21 RX ORDER — METOPROLOL SUCCINATE 50 MG/1
TABLET, EXTENDED RELEASE ORAL
Qty: 90 TABLET | Refills: 2 | Status: SHIPPED | OUTPATIENT
Start: 2021-02-21 | End: 2021-11-26

## 2021-03-23 ENCOUNTER — COMMUNICATION - HEALTHEAST (OUTPATIENT)
Dept: INTERNAL MEDICINE | Facility: CLINIC | Age: 81
End: 2021-03-23

## 2021-03-23 DIAGNOSIS — H90.8 MIXED HEARING LOSS: ICD-10-CM

## 2021-03-29 ENCOUNTER — RECORDS - HEALTHEAST (OUTPATIENT)
Dept: ADMINISTRATIVE | Facility: OTHER | Age: 81
End: 2021-03-29

## 2021-04-10 ENCOUNTER — COMMUNICATION - HEALTHEAST (OUTPATIENT)
Dept: INTERNAL MEDICINE | Facility: CLINIC | Age: 81
End: 2021-04-10

## 2021-04-10 DIAGNOSIS — E78.00 PURE HYPERCHOLESTEROLEMIA: ICD-10-CM

## 2021-04-12 RX ORDER — ATORVASTATIN CALCIUM 10 MG/1
TABLET, FILM COATED ORAL
Qty: 90 TABLET | Refills: 2 | Status: SHIPPED | OUTPATIENT
Start: 2021-04-12

## 2021-05-22 ENCOUNTER — COMMUNICATION - HEALTHEAST (OUTPATIENT)
Dept: INTERNAL MEDICINE | Facility: CLINIC | Age: 81
End: 2021-05-22

## 2021-05-22 DIAGNOSIS — I89.0 LYMPHEDEMA OF LEFT LOWER EXTREMITY: ICD-10-CM

## 2021-05-24 RX ORDER — FUROSEMIDE 40 MG
TABLET ORAL
Qty: 90 TABLET | Refills: 1 | Status: SHIPPED | OUTPATIENT
Start: 2021-05-24

## 2021-05-26 ENCOUNTER — RECORDS - HEALTHEAST (OUTPATIENT)
Dept: ADMINISTRATIVE | Facility: CLINIC | Age: 81
End: 2021-05-26

## 2021-05-26 VITALS — TEMPERATURE: 98.1 F

## 2021-05-26 NOTE — TELEPHONE ENCOUNTER
Refills Approved x 2     Rx renewed per Medication Renewal Policy. Medication was last renewed on   Atorvastatin = 3/2/2018 with 3 refills  Furosemide = 10/11/2018 with 3 refillls---change in pharmacy noted/change in dosage noted.    Last office visit: 12/28/2018 with Dr PATT Dixon, [facility] Triage/Med Refill 3/23/2019     Requested Prescriptions   Pending Prescriptions Disp Refills     furosemide (LASIX) 40 MG tablet [Pharmacy Med Name: FUROSEMIDE 40MG TABLETS] 90 tablet 0     Sig: TAKE 1 TABLET(40 MG) BY MOUTH DAILY    Diuretics/Combination Diuretics Refill Protocol  Passed - 3/22/2019  9:14 AM       Passed - Visit with PCP or prescribing provider visit in past 12 months    Last office visit with prescriber/PCP: 12/28/2018 Dar Wooten MD OR same dept: 12/28/2018 Dar Wooten MD OR same specialty: 12/28/2018 Dar Wooten MD  Last physical: 9/14/2017 Last MTM visit: Visit date not found   Next visit within 3 mo: Visit date not found  Next physical within 3 mo: Visit date not found  Prescriber OR PCP: Dar Wooten MD  Last diagnosis associated with med order: There are no diagnoses linked to this encounter.  If protocol passes may refill for 12 months if within 3 months of last provider visit (or a total of 15 months).            Passed - Serum Potassium in past 12 months     Lab Results   Component Value Date    Potassium 4.0 12/28/2018            Passed - Serum Sodium in past 12 months     Lab Results   Component Value Date    Sodium 141 12/28/2018            Passed - Blood pressure on file in past 12 months    BP Readings from Last 1 Encounters:   12/28/18 123/50            Passed - Serum Creatinine in past 12 months     Creatinine   Date Value Ref Range Status   12/28/2018 1.17 (H) 0.60 - 1.10 mg/dL Final             atorvastatin (LIPITOR) 10 MG tablet [Pharmacy Med Name: ATORVASTATIN 10MG TABLETS] 90 tablet 0     Sig: TAKE 1 TABLET(10 MG) BY MOUTH DAILY     Statins Refill Protocol (Hmg CoA Reductase Inhibitors) Passed - 3/22/2019  9:14 AM       Passed - PCP or prescribing provider visit in past 12 months     Last office visit with prescriber/PCP: 12/28/2018 Dar Wooten MD OR same dept: 12/28/2018 Dar Wooten MD OR same specialty: 12/28/2018 Dar Wooten MD  Last physical: 9/14/2017 Last MTM visit: Visit date not found   Next visit within 3 mo: Visit date not found  Next physical within 3 mo: Visit date not found  Prescriber OR PCP: Dar Wooten MD  Last diagnosis associated with med order: There are no diagnoses linked to this encounter.  If protocol passes may refill for 12 months if within 3 months of last provider visit (or a total of 15 months).

## 2021-05-27 ENCOUNTER — RECORDS - HEALTHEAST (OUTPATIENT)
Dept: ADMINISTRATIVE | Facility: CLINIC | Age: 81
End: 2021-05-27

## 2021-05-27 NOTE — PATIENT INSTRUCTIONS - HE
For diarrhea, stop:  Iron ferrous sulfate  Colace/docussate  omeprazole    Decrease Metformin to one pill twice a day    Replace the Omeprazole with Famotidine 20 mgs twice a day for acid    Try one vitamin B 12 shot for the neuropathy    There are more pills to add if we need to for the neuropathy    Update blood tests

## 2021-05-27 NOTE — TELEPHONE ENCOUNTER
Patient Returning Call  Reason for call:  Patient called back.  Information relayed to patient:  n/a  Patient has additional questions:  Yes  If YES, what are your questions/concerns:  Patient states she is getting nervous that she only has two more days left of the Januvia.   Patient is requesting a call back from Sebas today.  Okay to leave a detailed message?: Yes

## 2021-05-27 NOTE — TELEPHONE ENCOUNTER
She has two more weeks worth of Januvia left at home, let her know that I spoke with the patient assistance program and they have not yet been able to verify for me that they have her application.  I will keep calling weekly to see when they have received her application where they are in the process.  If she runs out of Januvia and her blood sugars increase I would recommend that we simply adjust her glipizide to address her elevated blood sugars until we are able to receive the medication.    She demonstrates understanding, no further issues identified.

## 2021-05-27 NOTE — TELEPHONE ENCOUNTER
Who is calling:  Patient   Reason for Call:  Patient is wanting to let Sebas Moncada, Pharm D, know that she received the letter from [employer] and that she is filling it out today and sending it back.   Date of last appointment with primary care: 03.25.19   Okay to leave a detailed message: Yes

## 2021-05-27 NOTE — TELEPHONE ENCOUNTER
I called the [facility] patient assistance program, and they have not yet confirmed that they received her application, however I did mail this in 2 weeks ago.  They stated that in addition to mailing time it takes about 5 days to process the application and they have encouraged me to call back next week.  I did tell Lupe that we will be okay over the next week or 2 if she is without medication, however hopefully the application goes through in a timely manner.  I will contact the program next week to follow-up.

## 2021-05-27 NOTE — PROGRESS NOTES
ASSESSMENT:  1. Diarrhea due to malabsorption  Strongly suspicious of microscopic colitis.  Discontinue iron, metformin, and omeprazole.  Decrease metformin.  No evidence of infection  - Comprehensive Metabolic Panel    2. Anemia due to chronic illness  Discontinue iron.  Update labs  - Ferritin  - Vitamin B12  - Erythrocyte Sedimentation Rate  - Iron and Transferrin Iron Binding Capacity  - HM2(CBC w/o Differential)    3. Type 2 diabetes mellitus with stage 3 chronic kidney disease, without long-term current use of insulin (H)  Recheck.  No hypoglycemia  - Glycosylated Hemoglobin A1c    4. Gastroesophageal reflux disease without esophagitis  Trial of famotidine to replace omeprazole  - famotidine (PEPCID) 20 MG tablet; Take 1 tablet (20 mg total) by mouth 2 (two) times a day.  Dispense: 60 tablet; Refill: 1    5. Diabetes mellitus, type 2 (H)  Trial of lower dose metformin  - metFORMIN (GLUCOPHAGE) 500 MG tablet; Take 1 tablet (500 mg total) by mouth 2 (two) times a day with meals.  Dispense: 180 tablet; Refill: 3    6. Neuropathy (H)  Treat edema.  Increase furosemide.  One B12 shot  - cyanocobalamin injection 1,000 mcg      Anxiety.  She seems much more composed, almost too flat.  Continue Abilify for now    PLAN:  Patient Instructions   For diarrhea, stop:  Iron ferrous sulfate  Colace/docussate  omeprazole    Decrease Metformin to one pill twice a day    Replace the Omeprazole with Famotidine 20 mgs twice a day for acid    Try one vitamin B 12 shot for the neuropathy    There are more pills to add if we need to for the neuropathy    Update blood tests        Orders Placed This Encounter   Procedures     Comprehensive Metabolic Panel     Glycosylated Hemoglobin A1c     Ferritin     Vitamin B12     Erythrocyte Sedimentation Rate     Iron and Transferrin Iron Binding Capacity     HM2(CBC w/o Differential)     Medications Discontinued During This Encounter   Medication Reason     docusate sodium (COLACE) 50 MG  capsule      generic lancets (FINGERSTIX LANCETS)      omeprazole (PRILOSEC) 20 MG capsule      metFORMIN (GLUCOPHAGE) 500 MG tablet      ferrous sulfate 325 (65 FE) MG tablet      Administrations This Visit     cyanocobalamin injection 1,000 mcg     Admin Date  03/25/2019 Action  Given Dose  1000 mcg Route  Intramuscular Administered By  Dawna Barnes CMA              Return in about 3 months (around 6/25/2019) for with any new symptoms or questions.      CHIEF COMPLAINT:  Chief Complaint   Patient presents with     Diarrhea     loose tools, diabetes nurve pain on bottom of feet        HISTORY OF PRESENT ILLNESS:  Lupe is a 79 y.o. female presenting to the clinic today with complaints of diarrhea and neuropathy.     Diarrhea: She started to develop diarrhea several weeks ago and has continued to have multiple loose stools per day. She denies blood, significant discomfort, nausea or vomiting, but the diarrhea has persisted. She has not tried taking anything for it. She has never had diarrhea from a medication in the past. She has a lot of urgency and needs to rush to a bathroom when she gets the urge to go. It was discussed that she is taking a few different medications that can cause diarrhea. She has been taking an iron supplement for quite a while. She states she has been taking docusate.     Neuropathy: She has started to develop pain and tingling in the bottoms of her feet in the past two weeks. She notices this the most at night and is concerned it could be neuropathy related to diabetes. She does not recall ever trying a vitamin B12 shot.     Diabetes: Her last hemoglobin A1c was 6.3% on 12/28/2018. Her diabetes has been well controlled, and she denies episodes of hypoglycemia. She is taking metformin 1000 mg twice daily.     Edema: She always has some lower extremity edema. She states she has been taking furosemide 40 mg daily, but her medication list states she is taking 80 mg daily, so it is  unclear what she is actually doing.     Anxiety: She thinks that the increased dose of Abilify has helped her mood, and it has not made her too fatigued. She takes lorazepam every other day, on average.     GERD: She has been taking omeprazole and states her reflux can get bad without it. She has never had an ulcer.     Hypertension: Her blood pressure has been in a good range.     REVIEW OF SYSTEMS:   Her weight has been stable. Denies chest pain or shortness of breath. All other systems are negative.    PFSH:  She stays busy. She goes for walks and cleans her own house.     TOBACCO USE:  Social History     Tobacco Use   Smoking Status Former Smoker     Last attempt to quit: 1960     Years since quittin.8   Smokeless Tobacco Never Used       VITALS:  Vitals:    19 1449   BP: 100/50   Pulse: 87   Weight: 186 lb 9 oz (84.6 kg)     Wt Readings from Last 3 Encounters:   19 186 lb 9 oz (84.6 kg)   18 190 lb 1.6 oz (86.2 kg)   18 180 lb (81.6 kg)     Body mass index is 34.12 kg/m .    PHYSICAL EXAM:  Constitutional:  Reveals an alert, pleasant, talkative woman. Ambulates with a wheeled walker.  Vitals:  Per nursing notes.  Respiratory: lung sounds diminished. No wheezes.   Cardiac:  Regular rate and rhythm without murmurs, rubs, or gallops.  Legs with 2+ edema. Palpation of the radial pulse regular.  Abdomen: Obese and soft. Hyperactive bowel sounds. No rebound tenderness.   Neurologic:  Alert and oriented.     Psychiatric:  Mood appropriate.      MEDICATIONS:  Current Outpatient Medications   Medication Sig Dispense Refill     albuterol (PROAIR HFA) 90 mcg/actuation inhaler Inhale 2 puffs every 6 (six) hours as needed for wheezing. 1 Inhaler 3     allopurinol (ZYLOPRIM) 300 MG tablet TAKE 1 TABLET(300 MG) BY MOUTH DAILY 90 tablet 3     ARIPiprazole (ABILIFY) 5 MG tablet Take 1 tablet (5 mg total) by mouth daily. 90 tablet 3     aspirin 81 MG EC tablet Take 1 tablet (81 mg total) by  mouth daily.  0     atorvastatin (LIPITOR) 10 MG tablet Take 1 tablet (10 mg total) by mouth daily. 90 tablet 3     blood glucose test (ACCU-CHEK OLY PLUS TEST STRP) strips TEST THREE TIMES DAILY 300 strip 3     buPROPion (WELLBUTRIN SR) 100 MG 12 hr tablet Take 1 tablet (100 mg total) by mouth daily. 90 tablet 3     calcium citrate (CALCITRATE) 200 mg (950 mg) tablet Take 1 tablet by mouth 2 (two) times a day.       cholecalciferol, vitamin D3, 5,000 unit Tab Take 5,000 Units by mouth daily.        cyanocobalamin (VITAMIN B-12) 500 MCG tablet Take 500 mcg by mouth daily.       FLUoxetine (PROZAC) 20 MG capsule Take 1 capsule (20 mg total) by mouth 2 (two) times a day. 180 capsule 3     fluticasone-salmeterol (ADVAIR DISKUS) 500-50 mcg/dose DISKUS Inhale 1 puff 2 (two) times a day. 3 each 3     furosemide (LASIX) 40 MG tablet Take 2 tablets (80 mg total) by mouth daily. 90 tablet 3     furosemide (LASIX) 40 MG tablet Take 2 tablets (80 mg total) by mouth daily. 180 tablet 3     glipiZIDE (GLUCOTROL) 5 MG tablet Take 1 tablet (5 mg total) by mouth daily before breakfast. 90 tablet 3     ipratropium-albuterol (DUO-NEB) 0.5-2.5 mg/3 mL nebulizer Take 3 mL by nebulization every 6 (six) hours as needed.        LORazepam (ATIVAN) 1 MG tablet TAKE 1 TABLET(1 MG) BY MOUTH TWICE DAILY AS NEEDED FOR ANXIETY 30 tablet 5     metFORMIN (GLUCOPHAGE) 500 MG tablet Take 1 tablet (500 mg total) by mouth 2 (two) times a day with meals. 180 tablet 3     metoprolol succinate (TOPROL-XL) 50 MG 24 hr tablet Take 1 tablet (50 mg total) by mouth at bedtime. 90 tablet 3     nitroglycerin (NITROSTAT) 0.4 MG SL tablet Place 0.4 mg under the tongue every 5 (five) minutes as needed for chest pain.       OMEGA-3/DHA/EPA/FISH OIL (FISH OIL-OMEGA-3 FATTY ACIDS) 300-1,000 mg capsule Take 1 g by mouth daily.        OXYGEN-AIR DELIVERY SYSTEMS MISC 2 L/min into each nostril as needed (with activity). Indications: use with activity to maintain sats  > 90%       potassium chloride (K-DUR,KLOR-CON) 20 MEQ tablet TAKE 1 TABLET(20 MEQ) BY MOUTH DAILY 90 tablet 3     sitaGLIPtin (JANUVIA) 100 MG tablet Take 1 tablet (100 mg total) by mouth daily. From patient assistance program. Do not refill. 90 tablet 3     vitamin E 400 unit capsule Take 400 Units by mouth daily.       famotidine (PEPCID) 20 MG tablet Take 1 tablet (20 mg total) by mouth 2 (two) times a day. 60 tablet 1     Current Facility-Administered Medications   Medication Dose Route Frequency Provider Last Rate Last Dose     cyanocobalamin injection 1,000 mcg  1,000 mcg Intramuscular Q30 Days Dar Wooten MD   1,000 mcg at 03/25/19 9988       ADDITIONAL HISTORY SUMMARIZED (2): None.  DECISION TO OBTAIN EXTRA INFORMATION (1): None.   RADIOLOGY TESTS (1): None.  LABS (1): Labs from October and December 2018 reviewed. Labs ordered.   MEDICINE TESTS (1): None.  INDEPENDENT REVIEW (2 each): None.     The visit lasted a total of 15 minutes face to face with the patient. Over 50% of the time was spent counseling and educating the patient about her diarrhea.    We, Teodoro Ibarra, a trained medical scribe, and Suha Christianson, a nurse practitioner student, are scribing for and in the presence of, Dr. Wooten.    I, Dr. Wooten, personally performed the services described in this documentation, as scribed by Teodoro Ibarra and Suha Christianson in my presence, and it is both accurate and complete.    Total data points: 1

## 2021-05-27 NOTE — TELEPHONE ENCOUNTER
Refill Approved    Rx renewed per Medication Renewal Policy. Medication was last renewed on 3/25/19.    Last office visit 3/25/19    Keven Starr, [facility] Connection Triage/Med Refill 3/26/2019     Requested Prescriptions   Pending Prescriptions Disp Refills     famotidine (PEPCID) 20 MG tablet [Pharmacy Med Name: FAMOTIDINE 20MG TABLETS] 180 tablet 1     Sig: TAKE 1 TABLET(20 MG) BY MOUTH TWICE DAILY    GI Medications Refill Protocol Passed - 3/25/2019  4:26 PM       Passed - PCP or prescribing provider visit in last 12 or next 3 months.    Last office visit with prescriber/PCP: 3/25/2019 Dar Wooten MD OR same dept: 3/25/2019 Dar Wooten MD OR same specialty: 3/25/2019 Dar Wooten MD  Last physical: 9/14/2017 Last MTM visit: Visit date not found   Next visit within 3 mo: Visit date not found  Next physical within 3 mo: Visit date not found  Prescriber OR PCP: Dar Wooten MD  Last diagnosis associated with med order: 1. Gastroesophageal reflux disease without esophagitis  - famotidine (PEPCID) 20 MG tablet [Pharmacy Med Name: FAMOTIDINE 20MG TABLETS]; TAKE 1 TABLET(20 MG) BY MOUTH TWICE DAILY  Dispense: 180 tablet; Refill: 1    If protocol passes may refill for 12 months if within 3 months of last provider visit (or a total of 15 months).

## 2021-05-27 NOTE — TELEPHONE ENCOUNTER
I called the drug company, they sent to Lupe and attestation form that she needs to sign and mail back.  It was mailed to her on April 3, 2019.  I called Lupe and she is not yet received it.  I let her know that she needs to sign this and mail it back right away in order for this to proceed.  She demonstrates understanding and will let me know when this has been completed.

## 2021-05-27 NOTE — TELEPHONE ENCOUNTER
Who is calling:  Patient  Reason for Call:  Patient is following on on a request for medication sitaGLIPtin (JANUVIA) 100 MG tablet. Patient states that she was supposed to have a  prior authorization for medication sent to insurance about a month ago, however, writer did not see a request or encounter for Januvia. Patient would like Hannah to call her back today as she is out of medication. Thanks  Date of last appointment with primary care: na  Okay to leave a detailed message: Yes       Repair Performed By Another Provider Text (Leave Blank If You Do Not Want): After the tissue was excised the defect was repaired by another provider.

## 2021-05-28 ENCOUNTER — RECORDS - HEALTHEAST (OUTPATIENT)
Dept: ADMINISTRATIVE | Facility: CLINIC | Age: 81
End: 2021-05-28

## 2021-05-28 NOTE — TELEPHONE ENCOUNTER
I spoke with Astley Clarke, she was approved for the Januvia patient assistance program, this occurred on April 16, 2019.  She has been approved for free Januvia through December 2019.  He was able to check with the pharmacy and this was requested yesterday, and generally takes 2-3 business days to reach the patient.    I spoke with Lupe, she demonstrates understanding.

## 2021-05-28 NOTE — TELEPHONE ENCOUNTER
Controlled Substance Refill Request  Medication:   Requested Prescriptions     Pending Prescriptions Disp Refills     LORazepam (ATIVAN) 1 MG tablet [Pharmacy Med Name: LORAZEPAM 1MG TABLETS] 30 tablet 0     Sig: TAKE 1 TABLET BY MOUTH TWICE DAILY AS NEEDED FOR ANXIETY     Date Last Fill: 1/30/19  Pharmacy: [id number]   Submit electronically to pharmacy  Controlled Substance Agreement on File:   Encounter-Level CSA Scan Date - 08/04/2016:    Scan on 8/5/2016  1:41 PM (below)         Last office visit: Last office visit pertaining to requested medication was 3/25/19.

## 2021-05-28 NOTE — TELEPHONE ENCOUNTER
Who is calling:  Patient   Reason for Call:  Wanting to inform Sebas Moncada, Pharm D, that she has 25 pills left of the medication Januvia and not sure what is happening with the pills that are suppose to be coming. Would like a call back to discuss medication.   Date of last appointment with primary care: 03.25.19  Okay to leave a detailed message: Yes

## 2021-05-29 ENCOUNTER — RECORDS - HEALTHEAST (OUTPATIENT)
Dept: ADMINISTRATIVE | Facility: CLINIC | Age: 81
End: 2021-05-29

## 2021-05-29 NOTE — PROGRESS NOTES
ASSESSMENT:  1. Type 2 diabetes mellitus with stage 3 chronic kidney disease, without long-term current use of insulin (H)  Stable.  Update labs  - Glycosylated Hemoglobin A1c    2. Chronic combined systolic and diastolic congestive heart failure (H)  Volume stable  - HM2(CBC w/o Differential)  - Basic Metabolic Panel    3. Severe obesity (BMI 35.0-35.9 with comorbidity) (H)  Weight stable.  Encouraged exercise    4. Mucopurulent chronic bronchitis (H)  Resolved.  Continue inhalers    5. Renal cell cancer, left (H)  Stable with watchful waiting    6. Anxiety  Perhaps to controlled.  Flat.  Trial of decreased Abilify  - ARIPiprazole (ABILIFY) 2 MG tablet; Take 1 tablet (2 mg total) by mouth daily.  Dispense: 90 tablet; Refill: 3      7.  Diarrhea.  Trial of decreased Abilify.  Timeframe favors    PLAN:  Patient Instructions   Decrease Abilify from 5 mgs to 2.5 mgs daily (one half of the 5's).  We increased this late December so the time frame fits    After finish the 5 mg Abilify, fill 2 mg pill    Stop omega 3 and vitamin E    Update blood tests    If the diarrhea continues we will stop the Allopurinol, or decrease Metformin further    Try one more vitamin B 12 and repeat every 3 months when you come in          Orders Placed This Encounter   Procedures     HM2(CBC w/o Differential)     Glycosylated Hemoglobin A1c     Basic Metabolic Panel     Medications Discontinued During This Encounter   Medication Reason     ARIPiprazole (ABILIFY) 5 MG tablet      Administrations This Visit     cyanocobalamin injection 1,000 mcg     Admin Date  06/10/2019 Action  Given Dose  1000 mcg Route  Intramuscular Administered By  Kassidy Garcia LPN              Return in about 3 months (around 9/10/2019) for for a full review of medications and lab testing.      CHIEF COMPLAINT:  Chief Complaint   Patient presents with     Medication Management       HISTORY OF PRESENT ILLNESS:  Lupe is a 79 y.o. female presenting to the clinic today  "to follow up on diarrhea, anxiety, diabetes, and GERD.    Diarrhea: She still has diarrhea, but it is not as bad as the last time she was here. She was instructed to stop iron, Colace, and omeprazole and decrease her metformin to one pill twice daily. She can have three or nine loose stools per day still. She has had this diarrhea for three months, but it is definitely improved from before. When she was here in March, she stated the diarrhea had been going on for \"several weeks.\"  She can tolerate the diarrhea but acknowledges it could be related to medication still.     Anxiety/Depression: She had her dose of Abilify increased to a full pill every day for anxiety when she was here in December, and she thinks it is working well. She also takes lorazepam as needed, which is usually once per day. Overall, her mood has been good. She has been taking Prozac for a long time as well.     Diabetes: Her last hemoglobin A1c was 5.5% on 3/25/2019. She had her dose of metformin decreased to 500 mg twice when she was here in March. She has been taking metformin for a long time.    Neuropathy: She thinks that the vitamin B12 shot she was given in March was very helpful for her neuropathy. She has not had another one since then but would like one today.     GERD: Her acid reflux has still been well controlled without omeprazole. She has been taking famotidine instead since March.     REVIEW OF SYSTEMS:   Her breathing has been fine, and she has not had a cough. She has not had any gout and does not have any pain at all today. She has not had a problem with extremity swelling lately.  All other systems are negative.    PFSH:  She manages well in her own home. Her daughter is happy with how she is doing.     TOBACCO USE:  Social History     Tobacco Use   Smoking Status Former Smoker     Last attempt to quit: 1960     Years since quittin.0   Smokeless Tobacco Never Used       VITALS:  Vitals:    06/10/19 1122   BP: 124/66 " "  Patient Site: Left Arm   Patient Position: Sitting   Cuff Size: Adult Large   Pulse: 68   Resp: 16   SpO2: 95%   Weight: 185 lb 4 oz (84 kg)   Height: 5' 2\" (1.575 m)     Wt Readings from Last 3 Encounters:   06/10/19 185 lb 4 oz (84 kg)   03/25/19 186 lb 9 oz (84.6 kg)   12/28/18 190 lb 1.6 oz (86.2 kg)     Body mass index is 33.88 kg/m .    PHYSICAL EXAM:  Constitutional:  Reveals an alert, pleasant, talkative female. Affect appropriate. Does not appear acutely ill.  Vitals:  Per nursing notes.  Cardiac:  Regular rate and rhythm without murmurs, rubs, or gallops. Legs without edema. Palpation of the radial pulse regular.  Lungs: Clear.  Respiratory effort normal.  Neurologic:  Cranial nerves II-XII intact.     Psychiatric:  Mood appropriate, memory intact.     MEDICATIONS:  Current Outpatient Medications   Medication Sig Dispense Refill     albuterol (PROAIR HFA) 90 mcg/actuation inhaler Inhale 2 puffs every 6 (six) hours as needed for wheezing. 1 Inhaler 3     allopurinol (ZYLOPRIM) 300 MG tablet TAKE 1 TABLET(300 MG) BY MOUTH DAILY 90 tablet 3     ARIPiprazole (ABILIFY) 2 MG tablet Take 1 tablet (2 mg total) by mouth daily. 90 tablet 3     aspirin 81 MG EC tablet Take 1 tablet (81 mg total) by mouth daily.  0     atorvastatin (LIPITOR) 10 MG tablet Take 1 tablet (10 mg total) by mouth daily. 90 tablet 3     blood glucose test (ACCU-CHEK OLY PLUS TEST STRP) strips TEST THREE TIMES DAILY 300 strip 3     buPROPion (WELLBUTRIN SR) 100 MG 12 hr tablet Take 1 tablet (100 mg total) by mouth daily. 90 tablet 3     calcium citrate (CALCITRATE) 200 mg (950 mg) tablet Take 1 tablet by mouth 2 (two) times a day.       cholecalciferol, vitamin D3, 5,000 unit Tab Take 5,000 Units by mouth daily.        cyanocobalamin (VITAMIN B-12) 500 MCG tablet Take 500 mcg by mouth daily.       famotidine (PEPCID) 20 MG tablet TAKE 1 TABLET(20 MG) BY MOUTH TWICE DAILY 180 tablet 1     FLUoxetine (PROZAC) 20 MG capsule Take 1 capsule " (20 mg total) by mouth 2 (two) times a day. 180 capsule 3     fluticasone-salmeterol (ADVAIR DISKUS) 500-50 mcg/dose DISKUS Inhale 1 puff 2 (two) times a day. 3 each 3     furosemide (LASIX) 40 MG tablet Take 2 tablets (80 mg total) by mouth daily. 90 tablet 3     furosemide (LASIX) 40 MG tablet Take 2 tablets (80 mg total) by mouth daily. 180 tablet 3     glipiZIDE (GLUCOTROL) 5 MG tablet Take 1 tablet (5 mg total) by mouth daily before breakfast. 90 tablet 3     ipratropium-albuterol (DUO-NEB) 0.5-2.5 mg/3 mL nebulizer Take 3 mL by nebulization every 6 (six) hours as needed.        LORazepam (ATIVAN) 1 MG tablet TAKE 1 TABLET BY MOUTH TWICE DAILY AS NEEDED FOR ANXIETY 30 tablet 5     metFORMIN (GLUCOPHAGE) 500 MG tablet Take 1 tablet (500 mg total) by mouth 2 (two) times a day with meals. 180 tablet 3     metoprolol succinate (TOPROL-XL) 50 MG 24 hr tablet Take 1 tablet (50 mg total) by mouth at bedtime. 90 tablet 3     nitroglycerin (NITROSTAT) 0.4 MG SL tablet Place 0.4 mg under the tongue every 5 (five) minutes as needed for chest pain.       OMEGA-3/DHA/EPA/FISH OIL (FISH OIL-OMEGA-3 FATTY ACIDS) 300-1,000 mg capsule Take 1 g by mouth daily.        OXYGEN-AIR DELIVERY SYSTEMS MISC 2 L/min into each nostril as needed (with activity). Indications: use with activity to maintain sats > 90%       potassium chloride (K-DUR,KLOR-CON) 20 MEQ tablet TAKE 1 TABLET(20 MEQ) BY MOUTH DAILY 90 tablet 3     sitaGLIPtin (JANUVIA) 100 MG tablet Take 1 tablet (100 mg total) by mouth daily. From patient assistance program. Do not refill. 90 tablet 3     vitamin E 400 unit capsule Take 400 Units by mouth daily.       Current Facility-Administered Medications   Medication Dose Route Frequency Provider Last Rate Last Dose     cyanocobalamin injection 1,000 mcg  1,000 mcg Intramuscular Q30 Days Dar Wooten MD   1,000 mcg at 06/10/19 1154       ADDITIONAL HISTORY SUMMARIZED (2): Reviewed 12/28/2019 note regarding increase of  Abilify  DECISION TO OBTAIN EXTRA INFORMATION (1): None.   RADIOLOGY TESTS (1): None.  LABS (1): Labs from 3/25/2019 reviewed. Labs ordered.   MEDICINE TESTS (1): None.  INDEPENDENT REVIEW (2 each): None.     The visit lasted a total of 13 minutes face to face with the patient. Over 50% of the time was spent counseling and educating the patient about her diarrhea.    ITeodoro, am scribing for and in the presence of, Dr. Wooten.    I, Dr. Wooten, personally performed the services described in this documentation, as scribed by Teodoro Ibarra in my presence, and it is both accurate and complete.    Total data points: 3

## 2021-05-29 NOTE — PATIENT INSTRUCTIONS - HE
Decrease Abilify from 5 mgs to 2.5 mgs daily (one half of the 5's).  We increased this late December so the time frame fits    After finish the 5 mg Abilify, fill 2 mg pill    Stop omega 3 and vitamin E    Update blood tests    If the diarrhea continues we will stop the Allopurinol, or decrease Metformin further    Try one more vitamin B 12 and repeat every 3 months when you come in

## 2021-05-29 NOTE — TELEPHONE ENCOUNTER
Medication Question or Clarification  Who is calling: Patient  What medication are you calling about? (include dose and sig) Januvia 100 mg daily   Who prescribed the medication?: Dar Wooten MD  What is your question/concern?: Patient states she is down to 45 tablets and needs Jacqueline RobertsD to contact the  for her next supply.  Pharmacy: None, patient states this medication comes directly from the .  Okay to leave a detailed message?: No  Site CMT - Please call the pharmacy to obtain any additional needed information.

## 2021-05-29 NOTE — PROGRESS NOTES
Pt denies any open sores on feet, does have numbness and tingling at times as well as very dry skin on both feet.

## 2021-05-30 VITALS — WEIGHT: 207.9 LBS | HEIGHT: 63 IN | BODY MASS INDEX: 36.84 KG/M2

## 2021-05-30 VITALS — BODY MASS INDEX: 38.25 KG/M2 | WEIGHT: 212.5 LBS

## 2021-05-30 NOTE — TELEPHONE ENCOUNTER
"Patient calling stating \"My feet are swollen up the ankle to my knee and I have diabetes and heart problems\"    \"It started Monday, this is the very first time that I had this. On Sunday I had a slight pain across my chest, it didn't last too long, I took nitro the pain went away and then my feet started to swell\"    Denies shortness of breath, denies chest pain    \"Pain is 6-7/10 continuous on the top of the foot in the left one to the ankle and on the right one there is no pain at all, redness in the toes, the left is much more swollen than the right\"    Per protocol patient to go to the ED or office with PCP approval. Patient agrees with the plan and her  is going to drive her to [facility]. Care advice given.    Reason for Disposition    Thigh or calf pain and only 1 side and present > 1 hour    Answer Assessment - Initial Assessment Questions  1. ONSET: \"When did the swelling start?\" (e.g., minutes, hours, days)      Monday  2. LOCATION: \"What part of the leg is swollen?\"  \"Are both legs swollen or just one leg?\"      Both legs, the left is worse than the right  3. SEVERITY: \"How bad is the swelling?\" (e.g., localized; mild, moderate, severe)   - Localized - small area of swelling localized to one leg   - MILD pedal edema - swelling limited to foot and ankle, pitting edema < 1/4 inch (6 mm) deep, rest and elevation eliminate most or all swelling   - MODERATE edema - swelling of lower leg to knee, pitting edema > 1/4 inch (6 mm) deep, rest and elevation only partially reduce swelling   - SEVERE edema - swelling extends above knee, facial or hand swelling present       Moderate  4. REDNESS: \"Does the swelling look red or infected?\"      Redness in the toes  5. PAIN: \"Is the swelling painful to touch?\" If so, ask: \"How painful is it?\"   (Scale 1-10; mild, moderate or severe)      6-7/10  6. FEVER: \"Do you have a fever?\" If so, ask: \"What is it, how was it measured, and when did it start?\"       " "Denies  7. CAUSE: \"What do you think is causing the leg swelling?\"      \"I don't know\"  8. MEDICAL HISTORY: \"Do you have a history of heart failure, kidney disease, liver failure, or cancer?\"        9. RECURRENT SYMPTOM: \"Have you had leg swelling before?\" If so, ask: \"When was the last time?\" \"What happened that time?\"      No  10. OTHER SYMPTOMS: \"Do you have any other symptoms?\" (e.g., chest pain, difficulty breathing)        \"no sir\"  11. PREGNANCY: \"Is there any chance you are pregnant?\" \"When was your last menstrual period?\"        n/a    Protocols used: LEG SWELLING AND EDEMA-A-OH      "

## 2021-05-31 ENCOUNTER — RECORDS - HEALTHEAST (OUTPATIENT)
Dept: ADMINISTRATIVE | Facility: CLINIC | Age: 81
End: 2021-05-31

## 2021-05-31 VITALS — HEIGHT: 63 IN | WEIGHT: 191.8 LBS | BODY MASS INDEX: 33.98 KG/M2

## 2021-05-31 VITALS — HEIGHT: 62 IN | WEIGHT: 202.2 LBS | BODY MASS INDEX: 37.21 KG/M2

## 2021-05-31 VITALS — HEIGHT: 62 IN | WEIGHT: 200.06 LBS | BODY MASS INDEX: 36.82 KG/M2

## 2021-05-31 VITALS — BODY MASS INDEX: 36.75 KG/M2 | WEIGHT: 199.7 LBS | HEIGHT: 62 IN

## 2021-05-31 VITALS — BODY MASS INDEX: 36.66 KG/M2 | WEIGHT: 203.7 LBS

## 2021-05-31 VITALS — HEIGHT: 62 IN | WEIGHT: 202 LBS | BODY MASS INDEX: 37.17 KG/M2

## 2021-05-31 VITALS — WEIGHT: 198.1 LBS | BODY MASS INDEX: 35.66 KG/M2

## 2021-05-31 VITALS — BODY MASS INDEX: 35.7 KG/M2 | WEIGHT: 195.2 LBS

## 2021-05-31 NOTE — TELEPHONE ENCOUNTER
Controlled Substance Refill Request  Medication:   Requested Prescriptions     Pending Prescriptions Disp Refills     LORazepam (ATIVAN) 1 MG tablet [Pharmacy Med Name: LORAZEPAM 1MG TABLETS] 30 tablet 0     Sig: TAKE 1 TABLET BY MOUTH TWICE DAILY AS NEEDED FOR ANXIETY     Date Last Fill: 5/9/19  [id number]   Submit electronically to pharmacy  Controlled Substance Agreement on File:   Encounter-Level CSA Scan Date - 08/04/2016:    Scan on 8/5/2016  1:41 PM (below)         Last office visit: 6/10/2019 Dar Wooten MD

## 2021-05-31 NOTE — PATIENT INSTRUCTIONS - HE
Consider meeting with Diogo, our therapist about anxiety   Increase advair (purple disc) 1 puff twice daily   Start taking calcium citrate 1 tab twice daily in addition to diet   Consider DEXA scan to monitor for bone strength   b12 injection today

## 2021-05-31 NOTE — TELEPHONE ENCOUNTER
RN cannot approve Refill Request    RN can NOT refill this medication med is not covered by policy/route to provider. Last office visit: 6/10/2019 Dar Wooten MD Last Physical: 9/14/2017 Last MTM visit: Visit date not found Last visit same specialty: 6/10/2019 Dar Wooten MD.  Next visit within 3 mo: Visit date not found  Next physical within 3 mo: Visit date not found      Jolene Weller, [facility] Triage/Med Refill 8/16/2019    Requested Prescriptions   Pending Prescriptions Disp Refills     allopurinol (ZYLOPRIM) 300 MG tablet [Pharmacy Med Name: ALLOPURINOL 300MG TABLETS] 90 tablet 0     Sig: TAKE 1 TABLET(300 MG) BY MOUTH DAILY       There is no refill protocol information for this order

## 2021-06-01 VITALS — BODY MASS INDEX: 34.35 KG/M2 | WEIGHT: 187.8 LBS

## 2021-06-01 VITALS — WEIGHT: 192.5 LBS | BODY MASS INDEX: 35.21 KG/M2

## 2021-06-01 VITALS — BODY MASS INDEX: 35.78 KG/M2 | WEIGHT: 195.6 LBS

## 2021-06-01 VITALS — BODY MASS INDEX: 35.72 KG/M2 | WEIGHT: 195.31 LBS

## 2021-06-01 VITALS — BODY MASS INDEX: 35.23 KG/M2 | WEIGHT: 192.6 LBS

## 2021-06-01 VITALS — BODY MASS INDEX: 34.9 KG/M2 | WEIGHT: 190.8 LBS

## 2021-06-01 VITALS — WEIGHT: 193.2 LBS | BODY MASS INDEX: 35.34 KG/M2

## 2021-06-01 NOTE — TELEPHONE ENCOUNTER
"Refills Approved x 2     Rx renewed per Medication Renewal Policy. Medication was last renewed on   Bupropion = 8/26/2019 with 3 refills \"no print\"  Glipizide = 10/15/2018 with 3 refills  Last office visit: MTM with A Antwan PharmD on 8/26/2019.     Cristal Dixon, [facility] Triage/Med Refill 9/14/2019     Requested Prescriptions   Pending Prescriptions Disp Refills     buPROPion (WELLBUTRIN SR) 100 MG 12 hr tablet [Pharmacy Med Name: BUPROPION SR 100MG TABLETS (12H)] 90 tablet 0     Sig: TAKE 1 TABLET(100 MG) BY MOUTH TWICE DAILY       Tricyclics/Misc Antidepressant/Antianxiety Meds Refill Protocol Passed - 9/14/2019 10:45 AM        Passed - PCP or prescribing provider visit in last year     Last office visit with prescriber/PCP: 6/10/2019 Dar Wooten MD OR same dept: 6/10/2019 Dar Wooten MD OR same specialty: 6/10/2019 Dar Wooten MD  Last physical: 9/14/2017 Last MTM visit: Visit date not found   Next visit within 3 mo: Visit date not found  Next physical within 3 mo: Visit date not found  Prescriber OR PCP: Dar Wooten MD  Last diagnosis associated with med order: 1. Anxiety  - buPROPion (WELLBUTRIN SR) 100 MG 12 hr tablet [Pharmacy Med Name: BUPROPION SR 100MG TABLETS (12H)]; TAKE 1 TABLET(100 MG) BY MOUTH TWICE DAILY  Dispense: 90 tablet; Refill: 0    2. Type 2 diabetes mellitus with stage 3 chronic kidney disease, without long-term current use of insulin (H)  - glipiZIDE (GLUCOTROL) 5 MG tablet [Pharmacy Med Name: GLIPIZIDE 5MG TABLETS]; TAKE 1 TABLET(5 MG) BY MOUTH DAILY BEFORE BREAKFAST  Dispense: 90 tablet; Refill: 0    If protocol passes may refill for 12 months if within 3 months of last provider visit (or a total of 15 months).             glipiZIDE (GLUCOTROL) 5 MG tablet [Pharmacy Med Name: GLIPIZIDE 5MG TABLETS] 90 tablet 0     Sig: TAKE 1 TABLET(5 MG) BY MOUTH DAILY BEFORE BREAKFAST       Oral Hypoglycemics Refill Protocol Passed - 9/14/2019 10:45 AM        " Passed - Visit with PCP or prescribing provider visit in last 6 months       Last office visit with prescriber/PCP: 6/10/2019 OR same dept: 6/10/2019 Dar Wooten MD OR same specialty: 6/10/2019 Dar Wooten MD Last physical: Visit date not found Last MTM visit: Visit date not found         Next appt within 3 mo: Visit date not found  Next physical within 3 mo: Visit date not found  Prescriber OR PCP: Dar Wooten MD  Last diagnosis associated with med order: 1. Anxiety  - buPROPion (WELLBUTRIN SR) 100 MG 12 hr tablet [Pharmacy Med Name: BUPROPION SR 100MG TABLETS (12H)]; TAKE 1 TABLET(100 MG) BY MOUTH TWICE DAILY  Dispense: 90 tablet; Refill: 0    2. Type 2 diabetes mellitus with stage 3 chronic kidney disease, without long-term current use of insulin (H)  - glipiZIDE (GLUCOTROL) 5 MG tablet [Pharmacy Med Name: GLIPIZIDE 5MG TABLETS]; TAKE 1 TABLET(5 MG) BY MOUTH DAILY BEFORE BREAKFAST  Dispense: 90 tablet; Refill: 0     If protocol passes may refill for 12 months if within 3 months of last provider visit (or a total of 15 months).           Passed - A1C in last 6 months     Hemoglobin A1c   Date Value Ref Range Status   06/10/2019 6.0 3.5 - 6.0 % Final               Passed - Microalbumin in last year      Microalbumin, Random Urine   Date Value Ref Range Status   09/18/2018 1.90 0.00 - 1.99 mg/dL Final                  Passed - Blood pressure in last year     BP Readings from Last 1 Encounters:   08/26/19 137/59             Passed - Serum creatinine in last year     Creatinine   Date Value Ref Range Status   06/27/2019 1.33 (H) 0.60 - 1.10 mg/dL Final

## 2021-06-01 NOTE — TELEPHONE ENCOUNTER
Controlled Substance Refill Request  Medication:   Requested Prescriptions     Pending Prescriptions Disp Refills     LORazepam (ATIVAN) 1 MG tablet [Pharmacy Med Name: LORAZEPAM 1MG TABLETS] 30 tablet 0     Sig: TAKE 1 TABLET BY MOUTH TWICE DAILY AS NEEDED FOR ANXIETY     Date Last Fill: 8/22/19  [id number]   Submit electronically to pharmacy  Controlled Substance Agreement on File:   Encounter-Level CSA Scan Date - 08/04/2016:    Scan on 8/5/2016  1:41 PM (below)         Last office visit: 6/10/2019 Dar Wooten MD Leslie White, RN BSBA [facility] Triage/Med Refill 9/13/2019 10:57 AM

## 2021-06-02 VITALS — BODY MASS INDEX: 32.92 KG/M2 | WEIGHT: 180 LBS

## 2021-06-02 VITALS — WEIGHT: 185 LBS | BODY MASS INDEX: 33.84 KG/M2

## 2021-06-02 VITALS — WEIGHT: 190.1 LBS | BODY MASS INDEX: 34.98 KG/M2 | HEIGHT: 62 IN

## 2021-06-02 VITALS — WEIGHT: 184.9 LBS | BODY MASS INDEX: 33.82 KG/M2

## 2021-06-02 VITALS — HEIGHT: 62 IN | WEIGHT: 195.13 LBS | BODY MASS INDEX: 35.91 KG/M2

## 2021-06-02 VITALS — HEIGHT: 62 IN | BODY MASS INDEX: 34.23 KG/M2 | WEIGHT: 186 LBS

## 2021-06-02 VITALS — WEIGHT: 180 LBS | BODY MASS INDEX: 32.92 KG/M2

## 2021-06-02 VITALS — BODY MASS INDEX: 36.62 KG/M2 | HEIGHT: 62 IN | WEIGHT: 199 LBS

## 2021-06-02 VITALS — WEIGHT: 184 LBS | BODY MASS INDEX: 33.65 KG/M2

## 2021-06-02 VITALS — BODY MASS INDEX: 34.12 KG/M2 | WEIGHT: 186.56 LBS

## 2021-06-03 VITALS — HEIGHT: 62 IN | WEIGHT: 185.25 LBS | BODY MASS INDEX: 34.09 KG/M2

## 2021-06-03 VITALS — WEIGHT: 187 LBS | BODY MASS INDEX: 33.66 KG/M2

## 2021-06-03 NOTE — TELEPHONE ENCOUNTER
RN cannot approve Refill Request    RN can NOT refill this medication med is not covered by policy/route to provider.      Cherry Chaudhry, [facility] Triage/Med Refill 11/7/2019    Requested Prescriptions   Pending Prescriptions Disp Refills     allopurinol (ZYLOPRIM) 300 MG tablet [Pharmacy Med Name: ALLOPURINOL 300MG TABLETS] 90 tablet 3     Sig: TAKE 1 TABLET(300 MG) BY MOUTH DAILY       There is no refill protocol information for this order      Signed Prescriptions Disp Refills    FLUoxetine (PROZAC) 20 MG capsule 180 capsule 1     Sig: TAKE 1 CAPSULE(20 MG) BY MOUTH TWICE DAILY       [facility] Refill Protocol  Passed - 11/6/2019  1:17 PM        Passed - PCP or prescribing provider visit in last year     Last office visit with prescriber/PCP: 6/10/2019 Dar Wooten MD OR same dept: 6/10/2019 Dar Wooten MD OR same specialty: 6/10/2019 Dar Wooten MD  Last physical: 9/14/2017 Last MTM visit: Visit date not found   Next visit within 3 mo: Visit date not found  Next physical within 3 mo: Visit date not found  Prescriber OR PCP: Dar Wooten MD  Last diagnosis associated with med order: 1. Anxiety  - FLUoxetine (PROZAC) 20 MG capsule; TAKE 1 CAPSULE(20 MG) BY MOUTH TWICE DAILY  Dispense: 180 capsule; Refill: 1    2. Elevated uric acid in blood  - allopurinol (ZYLOPRIM) 300 MG tablet [Pharmacy Med Name: ALLOPURINOL 300MG TABLETS]; TAKE 1 TABLET(300 MG) BY MOUTH DAILY  Dispense: 90 tablet; Refill: 0    If protocol passes may refill for 12 months if within 3 months of last provider visit (or a total of 15 months).

## 2021-06-03 NOTE — TELEPHONE ENCOUNTER
Refill Approved    Rx renewed per Medication Renewal Policy. Medication was last renewed on 10/15/18.    Cherry Chaudhry, [facility] Triage/Med Refill 11/7/2019     Requested Prescriptions   Pending Prescriptions Disp Refills     FLUoxetine (PROZAC) 20 MG capsule [Pharmacy Med Name: FLUOXETINE 20MG CAPSULES] 180 capsule 0     Sig: TAKE 1 CAPSULE(20 MG) BY MOUTH TWICE DAILY       SSRI Refill Protocol  Passed - 11/6/2019  1:17 PM        Passed - PCP or prescribing provider visit in last year     Last office visit with prescriber/PCP: 6/10/2019 Dar Wooten MD OR same dept: 6/10/2019 Dar Wooten MD OR same specialty: 6/10/2019 Dar Wooten MD  Last physical: 9/14/2017 Last MTM visit: Visit date not found   Next visit within 3 mo: Visit date not found  Next physical within 3 mo: Visit date not found  Prescriber OR PCP: Dar Wooten MD  Last diagnosis associated with med order: 1. Anxiety  - FLUoxetine (PROZAC) 20 MG capsule [Pharmacy Med Name: FLUOXETINE 20MG CAPSULES]; TAKE 1 CAPSULE(20 MG) BY MOUTH TWICE DAILY  Dispense: 180 capsule; Refill: 0    2. Elevated uric acid in blood  - allopurinol (ZYLOPRIM) 300 MG tablet [Pharmacy Med Name: ALLOPURINOL 300MG TABLETS]; TAKE 1 TABLET(300 MG) BY MOUTH DAILY  Dispense: 90 tablet; Refill: 0    If protocol passes may refill for 12 months if within 3 months of last provider visit (or a total of 15 months).             allopurinol (ZYLOPRIM) 300 MG tablet [Pharmacy Med Name: ALLOPURINOL 300MG TABLETS] 90 tablet 0     Sig: TAKE 1 TABLET(300 MG) BY MOUTH DAILY       There is no refill protocol information for this order

## 2021-06-03 NOTE — TELEPHONE ENCOUNTER
Controlled Substance Refill Request  Medication:   Requested Prescriptions     Pending Prescriptions Disp Refills     LORazepam (ATIVAN) 1 MG tablet [Pharmacy Med Name: LORAZEPAM 1MG TABLETS] 30 tablet 0     Sig: TAKE 1 TABLET BY MOUTH TWICE DAILY AS NEEDED FOR ANXIETY     Date Last Fill: 9/13/19  Pharmacy: [id number]  Submit electronically to pharmacy  Controlled Substance Agreement on File:   Encounter-Level CSA Scan Date - 08/04/2016:    Scan on 8/5/2016  1:41 PM       Last office visit: 6/10/2019 Dar Wooten MD

## 2021-06-04 VITALS — HEIGHT: 63 IN | WEIGHT: 195 LBS | BODY MASS INDEX: 34.55 KG/M2

## 2021-06-04 VITALS
HEIGHT: 63 IN | OXYGEN SATURATION: 95 % | BODY MASS INDEX: 31.97 KG/M2 | DIASTOLIC BLOOD PRESSURE: 64 MMHG | HEART RATE: 90 BPM | SYSTOLIC BLOOD PRESSURE: 102 MMHG | WEIGHT: 180.44 LBS

## 2021-06-05 VITALS
SYSTOLIC BLOOD PRESSURE: 128 MMHG | WEIGHT: 171 LBS | OXYGEN SATURATION: 96 % | HEIGHT: 63 IN | HEART RATE: 70 BPM | BODY MASS INDEX: 30.3 KG/M2 | DIASTOLIC BLOOD PRESSURE: 78 MMHG

## 2021-06-05 VITALS
RESPIRATION RATE: 12 BRPM | WEIGHT: 169 LBS | HEART RATE: 102 BPM | SYSTOLIC BLOOD PRESSURE: 134 MMHG | DIASTOLIC BLOOD PRESSURE: 75 MMHG | BODY MASS INDEX: 30.42 KG/M2

## 2021-06-05 NOTE — TELEPHONE ENCOUNTER
Who is calling:  Arias   Reason for Call:  Patient's Oxygen expires in March 2020, and  insurances is needing a face to face appointment to see if patient is still in need of it. Caller stated that [facility] patient was just in yesterday 01/23/19, if Dar Wooten MD is willing to addendum the note to say patient is using it and benefiting from it and anything else of what Dar Wooten MD thinks patient should continue with the oxygen.     Date of last appointment with primary care: 01/23/20  Okay to leave a detailed message: Yes  [phone]

## 2021-06-05 NOTE — PROGRESS NOTES
ASSESSMENT:  1. Type 2 diabetes mellitus with stage 3 chronic kidney disease, without long-term current use of insulin (H)  Stable.  Update labs.  If well controlled may be able to decrease or stop Januvia  - Glycosylated Hemoglobin A1c  - Comprehensive Metabolic Panel    2. Medication management  Recheck now taking lorazepam twice daily  - Drug Abuse 1+, Urine  - Uric Acid    3. Anxiety  Mood remains excellent.  Slightly more animated with decrease in Abilify.  Reviewed initiation of Abilify.  Consider decrease further to 1 mg, however she feels very very well and does not wish to change    4. Essential hypertension with goal blood pressure less than 140/90  Stable.  Update labs  - Comprehensive Metabolic Panel    5. Pure hypercholesterolemia  - Comprehensive Metabolic Panel  - Lipid Cascade    6. Gastroesophageal reflux disease without esophagitis    7. Elevated uric acid in blood  Recheck on allopurinol.  Pain minimal  - Uric Acid    8. Oxygen:   Pt contacted via phone on 1/29/2020 for oxygen use prescription update. PT states she uses oxygen 2-3 times a week when feeling short of breath, or dizzy. She states when using it she uses 2L.    PLAN:  Patient Instructions   meds are clarified and good    Update blood tests    Leave the meds the same    Your diabetes has been very well controlled for the last year.  If it is still well controlled, we will decrease the cost and dose of the Januvia      Orders Placed This Encounter   Procedures     Glycosylated Hemoglobin A1c     Drug Abuse 1+, Urine     Last Lorazepam taken today at 1:06pm     Comprehensive Metabolic Panel     Uric Acid     Lipid Cascade     Order Specific Question:   Fasting is required?     Answer:   Unknown     Medications Discontinued During This Encounter   Medication Reason     buPROPion (WELLBUTRIN SR) 100 MG 12 hr tablet Duplicate order       Return in about 6 months (around 7/23/2020).      CHIEF COMPLAINT:  Chief Complaint   Patient presents  "with     Follow-up     Januvia med check/refill, sign CSA       HISTORY OF PRESENT ILLNESS:  Lupe is a 79 y.o. female presenting to the clinic today a routine 6 month follow up. Lupe believes she has been \"going about her merry little way plotting her life and so far so good.\" She has no new complaints today and feels all her medications are at the proper dosage. Lupe states she has general aches and pain, her mood is stable and things have been so quiet she doesn't want to disrupt anything. She does want to discuss her Januvia medication because was to switch and signed all the paper work but when she went to speak to the pharmacy they could not find it.  Lupe went to the hospital in  for leg swelling but states they did not add any medication or diagnose her with anything. She notes it was present for three days then and has not occurred again.       REVIEW OF SYSTEMS:   Denies falling, diarrhea, blow sugar reactions, and dyspnea.   Admits to stable blood pressures and mood.   All other systems are negative.    PFSH:  Admits to staying active with different organizations as she is still primarily at home. .     TOBACCO USE:  Social History     Tobacco Use   Smoking Status Former Smoker     Last attempt to quit: 1960     Years since quittin.6   Smokeless Tobacco Never Used       VITALS:  Vitals:    20 1443   BP: 102/64   Patient Site: Left Arm   Patient Position: Sitting   Cuff Size: Adult Large   Pulse: 90   SpO2: 95%   Weight: 180 lb 7 oz (81.8 kg)   Height: 5' 2.5\" (1.588 m)     Wt Readings from Last 3 Encounters:   20 180 lb 7 oz (81.8 kg)   19 187 lb (84.8 kg)   19 185 lb (83.9 kg)     Body mass index is 32.48 kg/m .    PHYSICAL EXAM:  Constitutional:  Reveals pleasant, alert elderly woman. Ambulates with a four wheeled walker.  Vitals:  Per nursing notes.  Cardiac:  Regular rate and rhythm without murmurs, rubs, or gallops. Carotids without bruits. Legs without edema. " Palpation of the radial pulse regular.  Lungs: Clear.  Respiratory effort normal.  Psychiatric:  Mood appropriate, memory intact.       MEDICATIONS:  Current Outpatient Medications   Medication Sig Dispense Refill     albuterol (PROAIR HFA) 90 mcg/actuation inhaler Inhale 2 puffs every 6 (six) hours as needed for wheezing. 1 Inhaler 3     allopurinol (ZYLOPRIM) 300 MG tablet TAKE 1 TABLET(300 MG) BY MOUTH DAILY 90 tablet 3     ARIPiprazole (ABILIFY) 2 MG tablet Take 1 tablet (2 mg total) by mouth daily. 90 tablet 3     aspirin 81 MG EC tablet Take 1 tablet (81 mg total) by mouth daily.  0     atorvastatin (LIPITOR) 10 MG tablet Take 1 tablet (10 mg total) by mouth daily. 90 tablet 3     blood glucose test (ACCU-CHEK OLY PLUS TEST STRP) strips TEST THREE TIMES DAILY 300 strip 3     buPROPion (WELLBUTRIN SR) 100 MG 12 hr tablet Take 1 tablet (100 mg total) by mouth 2 (two) times a day. 180 tablet 3     calcium citrate (CALCITRATE) 200 mg (950 mg) tablet Take 1 tablet by mouth 2 (two) times a day.       cholecalciferol, vitamin D3, 5,000 unit Tab Take 1,000 Units by mouth daily.             famotidine (PEPCID) 20 MG tablet Take 1 tablet (20 mg total) by mouth 2 (two) times a day. 180 tablet 1     FLUoxetine (PROZAC) 20 MG capsule TAKE 1 CAPSULE(20 MG) BY MOUTH TWICE DAILY 180 capsule 1     fluticasone propion-salmeterol (ADVAIR DISKUS) 500-50 mcg/dose DISKUS Inhale 1 puff 2 (two) times a day. 3 each 3     furosemide (LASIX) 40 MG tablet Take 2 tablets (80 mg total) by mouth daily. 180 tablet 3     glipiZIDE (GLUCOTROL) 5 MG tablet Take 1 tablet (5 mg total) by mouth daily. Before breakfast 90 tablet 3     ipratropium-albuterol (DUO-NEB) 0.5-2.5 mg/3 mL nebulizer Take 3 mL by nebulization every 6 (six) hours as needed.        LORazepam (ATIVAN) 1 MG tablet TAKE 1 TABLET BY MOUTH TWICE DAILY AS NEEDED FOR ANXIETY 30 tablet 0     metFORMIN (GLUCOPHAGE) 500 MG tablet Take 1 tablet (500 mg total) by mouth 2 (two) times a  day with meals. 180 tablet 3     metoprolol succinate (TOPROL-XL) 50 MG 24 hr tablet Take 1 tablet (50 mg total) by mouth at bedtime. 90 tablet 3     nitroglycerin (NITROSTAT) 0.4 MG SL tablet Place 0.4 mg under the tongue every 5 (five) minutes as needed for chest pain.       OXYGEN-AIR DELIVERY SYSTEMS MISC 2 L/min into each nostril as needed (with activity). Indications: use with activity to maintain sats > 90%       potassium chloride (K-DUR,KLOR-CON) 20 MEQ tablet Take 1 tablet (20 mEq total) by mouth daily. 90 tablet 1     sitaGLIPtin (JANUVIA) 100 MG tablet Take 1 tablet (100 mg total) by mouth daily. From patient assistance program. Do not refill. 90 tablet 3     Current Facility-Administered Medications   Medication Dose Route Frequency Provider Last Rate Last Dose     cyanocobalamin injection 1,000 mcg  1,000 mcg Intramuscular Q30 Days Dar Wooten MD   1,000 mcg at 08/26/19 1124       ADDITIONAL HISTORY SUMMARIZED (2): Reviewed 6/27/2019  [facility] ED encounter for leg swelling.   Reviewed 8/26/2019  [facility] encounter   DECISION TO OBTAIN EXTRA INFORMATION (1): None.   RADIOLOGY TESTS (1): None.  LABS (1): Reviewed and ordered..  MEDICINE TESTS (1): None.  INDEPENDENT REVIEW (2 each): None.     The visit lasted a total of 18 minutes face to face with the patient. Over 50% of the time was spent counseling and educating the patient about .    ICristina, am scribing for and in the presence of, Dr. Wooten.    I, Dr. Wooten, personally performed the services described in this documentation, as scribed by Cristina Sanchez in my presence, and it is both accurate and complete.    Total data points: 3

## 2021-06-05 NOTE — TELEPHONE ENCOUNTER
Line busy x 3 attempts.  Will attempt to reach pt later.  If pt calls, please relay pcp message/quesiton.

## 2021-06-05 NOTE — TELEPHONE ENCOUNTER
Controlled Substance Refill Request  Medication Name:   Requested Prescriptions     Pending Prescriptions Disp Refills     LORazepam (ATIVAN) 1 MG tablet [Pharmacy Med Name: LORAZEPAM 1MG TABLETS] 30 tablet 0     Sig: TAKE 1 TABLET BY MOUTH TWICE DAILY AS NEEDED FOR ANXIETY     Date Last Fill: 11/29/19  [facility]ed [facility]acy: [facility]  Submit electronically to pharmacy  Controlled Substance Agreement on file:   Encounter-Level CSA Scan Date - 10/15/2018:    Scan on 10/17/2018  2:55 PM           Encounter-Level CSA Scan Date - 08/04/2016:    Scan on 8/5/2016  1:41 PM        Last office visit:  6/10/19

## 2021-06-05 NOTE — TELEPHONE ENCOUNTER
We issue did not come up.  Can you please call her and ask whether she uses it anymore?  And if so, under what context.  Please ask what questions they want and then we can addend the note if needed

## 2021-06-05 NOTE — TELEPHONE ENCOUNTER
Medication Request     STAT    Medication name:   sitaGLIPtin (GENIUVIA) 100 MG tablet 90 tablet 3 12/28/2018     Sig - Route: Take 1 tablet (100 mg total) by mouth daily. From patient assistance program        Requested Pharmacy:   [facility] medication Assistance program     Reason for request:  Patient states she called [employer] requesting a refill of med and they informed her they don't have a file on her.    She only has a couple of days left and needs form refax and call [facility] to confirm receipt of script.    Patient is nervous so if someone call her and reassure her this was take care of she would appreciate it.    When did you use medication last?:  01/24/2020    Patient offered appointment:    patient declined     Okay to leave a detailed message: yes

## 2021-06-05 NOTE — TELEPHONE ENCOUNTER
Thank you for calling [employer]. Additionally, based on most recent A1C it would be reasonable to trial off of Januvia, therefore it will be fine if she goes without this for some time.  Discussed with Dr. Wooten as well.  We will see what the [employer] patient assistance program says in 1 week and update patient.    Lab Results   Component Value Date    HGBA1C 6.0 01/23/2020

## 2021-06-05 NOTE — TELEPHONE ENCOUNTER
Refill Approved    Rx renewed per Medication Renewal Policy. Medication was last renewed on 11/7/18 and 3/26/19.    Jackie Yang, [facility] Triage/Med Refill 1/20/2020     Requested Prescriptions   Pending Prescriptions Disp Refills     potassium chloride (K-DUR,KLOR-CON) 20 MEQ tablet [Pharmacy Med Name: POTASSIUM CL 20MEQ ER TABLETS] 90 tablet 3     Sig: TAKE 1 TABLET(20 MEQ) BY MOUTH DAILY       Potassium Supplements Refill Protocol Passed - 1/20/2020  8:59 AM        Passed - PCP or prescribing provider visit in past 12 months       Last office visit with prescriber/PCP: 6/10/2019 Dar Wooten MD OR same dept: 6/10/2019 Dar Wooten MD OR same specialty: 6/10/2019 Dar Wooten MD  Last physical: 9/14/2017 Last MTM visit: Visit date not found   Next visit within 3 mo: Visit date not found  Next physical within 3 mo: Visit date not found  Prescriber OR PCP: Dar Wooten MD  Last diagnosis associated with med order: 1. Essential hypertension  - potassium chloride (K-DUR,KLOR-CON) 20 MEQ tablet [Pharmacy Med Name: POTASSIUM CL 20MEQ ER TABLETS]; TAKE 1 TABLET(20 MEQ) BY MOUTH DAILY  Dispense: 90 tablet; Refill: 3    2. Hypokalemia due to loss of potassium  - potassium chloride (K-DUR,KLOR-CON) 20 MEQ tablet [Pharmacy Med Name: POTASSIUM CL 20MEQ ER TABLETS]; TAKE 1 TABLET(20 MEQ) BY MOUTH DAILY  Dispense: 90 tablet; Refill: 3    3. Gastroesophageal reflux disease without esophagitis  - famotidine (PEPCID) 20 MG tablet [Pharmacy Med Name: FAMOTIDINE 20MG TABLETS]; TAKE 1 TABLET(20 MG) BY MOUTH TWICE DAILY  Dispense: 180 tablet; Refill: 1    If protocol passes may refill for 12 months if within 3 months of last provider visit (or a total of 15 months).             Passed - Potassium level in last 12 months     Lab Results   Component Value Date    Potassium 4.1 06/27/2019             famotidine (PEPCID) 20 MG tablet [Pharmacy Med Name: FAMOTIDINE 20MG TABLETS] 180 tablet 1      Sig: TAKE 1 TABLET(20 MG) BY MOUTH TWICE DAILY       GI Medications Refill Protocol Passed - 1/20/2020  8:59 AM        Passed - PCP or prescribing provider visit in last 12 or next 3 months.     Last office visit with prescriber/PCP: 6/10/2019 Dar Wooten MD OR same dept: 6/10/2019 Dar Wooten MD OR same specialty: 6/10/2019 Dar Wooten MD  Last physical: 9/14/2017 Last MTM visit: Visit date not found   Next visit within 3 mo: Visit date not found  Next physical within 3 mo: Visit date not found  Prescriber OR PCP: Dar Wooten MD  Last diagnosis associated with med order: 1. Essential hypertension  - potassium chloride (K-DUR,KLOR-CON) 20 MEQ tablet [Pharmacy Med Name: POTASSIUM CL 20MEQ ER TABLETS]; TAKE 1 TABLET(20 MEQ) BY MOUTH DAILY  Dispense: 90 tablet; Refill: 3    2. Hypokalemia due to loss of potassium  - potassium chloride (K-DUR,KLOR-CON) 20 MEQ tablet [Pharmacy Med Name: POTASSIUM CL 20MEQ ER TABLETS]; TAKE 1 TABLET(20 MEQ) BY MOUTH DAILY  Dispense: 90 tablet; Refill: 3    3. Gastroesophageal reflux disease without esophagitis  - famotidine (PEPCID) 20 MG tablet [Pharmacy Med Name: FAMOTIDINE 20MG TABLETS]; TAKE 1 TABLET(20 MG) BY MOUTH TWICE DAILY  Dispense: 180 tablet; Refill: 1    If protocol passes may refill for 12 months if within 3 months of last provider visit (or a total of 15 months).

## 2021-06-05 NOTE — TELEPHONE ENCOUNTER
Spoke with pt who states she uses her oxygen a few times a week.  Pt reports sometimes she feels short of breath, dizzy, and when she feels like that she puts on oxygen. Pt states when she does use oxygen she uses 2L.

## 2021-06-05 NOTE — PATIENT INSTRUCTIONS - HE
meds are clarified and good    Update blood tests    Leave the meds the same    Your diabetes has been very well controlled for the last year.  If it is still well controlled, we will decrease the cost and dose of the Januvia

## 2021-06-05 NOTE — TELEPHONE ENCOUNTER
Who is calling:  Patient  Reason for Call:  Patient is calling for refill for ERASMO patient  States she have only 7 pills left . Patient requested a call from [facility] the Pharmacy .   Date of last appointment with primary care: 0610/19  Okay to leave a detailed message: No

## 2021-06-05 NOTE — TELEPHONE ENCOUNTER
Spoke with [facility] patient assistance program who stated pt still isn't in their system.  Was told they are still backed up with applications and advised to call back next week to see if application has bee processed.

## 2021-06-05 NOTE — TELEPHONE ENCOUNTER
Spoke with [employer], who reports they haven't processed the application yet,  still in process. Reports application isn't lost, they are still processing applications for end of December.  Was advised to call next week for an update.    Per verbal conversation with Pharm D, okay that application is still in process, okay if pt cannot start the Januvia for a few weeks.    Spoke with pt and relayed info that LPN found out from [facility] as well as message from Pharm D.  Pt appreciative of call. LPN will update pt next week after call to [facility] is made.  Pt understanding.

## 2021-06-06 NOTE — ANESTHESIA POSTPROCEDURE EVALUATION
Patient: Lupe Hill  Procedure(s):  CLOSED REDUCTION, RIGHT HIP, WITH NAIL INSERTION, USING HANA TABLE (Right)  Anesthesia type: spinal    Patient location: PACU  Last vitals:   Vitals Value Taken Time   /66 3/17/2020 11:15 AM   Temp 36.6  C (97.9  F) 3/17/2020 11:00 AM   Pulse 74 3/17/2020 11:26 AM   Resp 15 3/17/2020 11:26 AM   SpO2 100 % 3/17/2020 11:26 AM   Vitals shown include unvalidated device data.  Post vital signs: stable  Level of consciousness: awake and responds to simple questions  Post-anesthesia pain: pain controlled  Post-anesthesia nausea and vomiting: no  Pulmonary: unassisted  Cardiovascular: stable  Hydration: adequate  Anesthetic events: no    QCDR Measures:  ASA# 11 - Rachelle-op Cardiac Arrest: ASA11B - Patient did NOT experience unanticipated cardiac arrest  ASA# 12 - Rachelle-op Mortality Rate: ASA12B - Patient did NOT die  ASA# 13 - PACU Re-Intubation Rate: NA - No ETT / LMA used for case  ASA# 10 - Composite Anes Safety: ASA10A - No serious adverse event    Additional Notes:

## 2021-06-06 NOTE — ANESTHESIA PREPROCEDURE EVALUATION
Anesthesia Evaluation      Patient summary reviewed   No history of anesthetic complications     Airway   Mallampati: II  Neck ROM: full   Pulmonary - normal exam   (+) COPD (2L at home, on inhalers) severe, sleep apnea on no CPAP, ,   (-) pneumonia, asthma                         Cardiovascular - normal exam  (+) hypertension, CAD, CHF (EF 53%), ,     ECG reviewed        Neuro/Psych    (+) depression,   (-) no neuromuscular disease    Endo/Other    (+) diabetes mellitus, obesity (BMI 34),      GI/Hepatic/Renal    (+) GERD well controlled,   chronic renal disease,      Other findings: 2017 ECHO with mild aortic stenosis      Dental    (+) poor dentition                       Anesthesia Plan  Planned anesthetic: spinal    ASA 3   Induction: intravenous   Anesthetic plan and risks discussed with: patient    Post-op plan: routine recovery

## 2021-06-06 NOTE — ANESTHESIA CARE TRANSFER NOTE
Last vitals:   Vitals:    03/17/20 1013   BP:    Pulse: 67   Resp: 13   Temp: 36.1  C (96.9  F)   SpO2: 100%     Patient's level of consciousness is drowsy  Spontaneous respirations: yes  Maintains airway independently: yes  Dentition unchanged: yes  Oropharynx: oropharynx clear of all foreign objects    QCDR Measures:  ASA# 20 - Surgical Safety Checklist: WHO surgical safety checklist completed prior to induction    PQRS# 430 - Adult PONV Prevention: NA - Not adult patient, not GA or 3 or more risk factors NOT present  ASA# 8 - Peds PONV Prevention: NA - Not pediatric patient, not GA or 2 or more risk factors NOT present  PQRS# 424 - Rachelle-op Temp Management: 4559F - At least one body temp DOCUMENTED => 35.5C or 95.9F within required timeframe  PQRS# 426 - PACU Transfer Protocol: - Transfer of care checklist used  ASA# 14 - Acute Post-op Pain: ASA14B - Patient did NOT experience pain >= 7 out of 10

## 2021-06-06 NOTE — TELEPHONE ENCOUNTER
Controlled Substance Refill Request  Medication Name:   Requested Prescriptions     Pending Prescriptions Disp Refills     LORazepam (ATIVAN) 1 MG tablet [Pharmacy Med Name: LORAZEPAM 1MG TABLETS] 30 tablet 0     Sig: TAKE 1 TABLET BY MOUTH TWICE DAILY AS NEEDED FOR ANXIETY     Date Last Fill: 1/17/20  [facility]ed Pharmacy: [facility]  Submit electronically to pharmacy  Controlled Substance Agreement on file:   Encounter-Level CSA Scan Date - 10/15/2018:    Scan on 10/17/2018  2:55 PM           Encounter-Level CSA Scan Date - 08/04/2016:    Scan on 8/5/2016  1:41 PM        Last office visit:  1/23/20

## 2021-06-06 NOTE — TELEPHONE ENCOUNTER
Refill Approved    Rx renewed per Medication Renewal Policy. Medication was last renewed on 11/2/19, last OV 1/23/20.    Neyda Marshall, [facility] Triage/Med Refill 2/15/2020     Requested Prescriptions   Pending Prescriptions Disp Refills     metoprolol succinate (TOPROL-XL) 50 MG 24 hr tablet [Pharmacy Med Name: METOPROLOL ER SUCCINATE 50MG TABS] 90 tablet 3     Sig: TAKE 1 TABLET(50 MG) BY MOUTH AT BEDTIME       Beta-Blockers Refill Protocol Passed - 2/10/2020 12:34 PM        Passed - PCP or prescribing provider visit in past 12 months or next 3 months     Last office visit with prescriber/PCP: 1/23/2020 Dar Wooten MD OR same dept: 1/23/2020 Dar Wooten MD OR same specialty: 1/23/2020 Dar Wooten MD  Last physical: 9/14/2017 Last MTM visit: Visit date not found   Next visit within 3 mo: Visit date not found  Next physical within 3 mo: Visit date not found  Prescriber OR PCP: Dar Wooten MD  Last diagnosis associated with med order: 1. Essential hypertension  - metoprolol succinate (TOPROL-XL) 50 MG 24 hr tablet [Pharmacy Med Name: METOPROLOL ER SUCCINATE 50MG TABS]; TAKE 1 TABLET(50 MG) BY MOUTH AT BEDTIME  Dispense: 90 tablet; Refill: 3    If protocol passes may refill for 12 months if within 3 months of last provider visit (or a total of 15 months).             Passed - Blood pressure filed in past 12 months     BP Readings from Last 1 Encounters:   01/23/20 102/64

## 2021-06-06 NOTE — TELEPHONE ENCOUNTER
I called [employer] patient assistance program, who still has not yet seen her application.  Encourage me to call back in 1-2 more weeks as they are very behind and applications at the beginning of the year.    I then called Lupe and let her know that because her A1c is so well controlled, that she may not need to be restarted on Januvia.  States that this is what Dr. Wooten had told her as well.  Therefore if we do not end up getting approval for her application, it is likely that we will keep her off this medication regardless.  She is grateful for the call, and demonstrates understanding.

## 2021-06-06 NOTE — TELEPHONE ENCOUNTER
Medication: Lorazepam  Last Date Filled 1/17/20   pulled: YES    Only PCP Prescribing? : YES  Taken as prescribed from physician notes? YES    CSA in last year: YES  Random Utox in last year: YES  (if any of the above answer NO - schedule with PCP)     Opioids + benzodiazepines? YES  (if the above answer YES - schedule with PCP every 6 months)     Is patient on the Executive Review Team? no      All responses suggest: Refilling prescription

## 2021-06-06 NOTE — TELEPHONE ENCOUNTER
Note from 1/23/20 office visit addended and faxed to [place] with additional information

## 2021-06-06 NOTE — ANESTHESIA PROCEDURE NOTES
Spinal Block    Patient location during procedure: OR  Start time: 3/17/2020 8:40 AM  End time: 3/17/2020 8:44 AM  Reason for block: at surgeon's request and primary anesthetic    Staffing:  Performing  Anesthesiologist: Nancy Rodriguez MD    Preanesthetic Checklist  Completed: patient identified, risks, benefits, and alternatives discussed, timeout performed, consent obtained, airway assessed, oxygen available, suction available, emergency drugs available and hand hygiene performed  Spinal Block  Patient position: sitting  Prep: ChloraPrep and site prepped and draped  Patient monitoring: heart rate, cardiac monitor, continuous pulse ox and blood pressure  Approach: midline  Location: L3-4  Injection technique: single-shot  Needle type: pencil-tip   Needle gauge: 22 G

## 2021-06-07 NOTE — TELEPHONE ENCOUNTER
"Patient calls nurse triage line with shortness of breath and wheezing. Patient's difficulty breathing is apparent just from listening to her on the phone. Audible wheezing. Moderate difficulty breathing, patient speaks in short phrases. I instructed patient to take her inhaler which she did with mild relief. Patient unable to say what kind of inhaler she has. States \"it's the blue one.\" She also says she is wearing her oxygen right now. Patient states she has hx of COPD and also states she has a cold right now. Her breathing has \"gotten pretty bad the last few days.\" She has not taken her temperature but states she has chills and thinks she may have a fever.  The patient has been living in a nursing home for the past 3 weeks as she had a fall in march where she broke her right hip. She does a lot of walking exercises that exacerbate her breathing symptoms. I asked her why her breathing was so bad at this moment and if she just did some exercising. She replied, \"no, I haven't been able to exercise the last couple days because of my breathing.\"    Patient advised to go to the ED now. She states that he  is with her right now and could drive her over to the [facility] ED.       If you were tested, we will call you with your COVID-19 result. You don't need to call us to check on your result. You can also use the information at the end of this document to sign up for [facility] [employer] where you can get your results and a message about those results sent to you through the [employer] application.    Regardless of if you have been tested or not:  Patient who have symptoms (cough, fever, or shortness of breath), need to isolate for 7 days from when symptoms started AND 72 hours after fever resolves (without fever reducing medications) AND improvement of respiratory symptoms (whichever is longer).      Isolate yourself at home (in own room/own bathroom if possible)    Do Not allow any visitors    Do Not go to " work or school    Do Not go to [place],  centers, shopping, or other public places.    Do Not shake hands.    Avoid close and intimate contact with others (hugging, kissing).    Follow CDC recommendations for household cleaning of frequently touched services.     After the initial 7 days, continue to isolate yourself from household members as much as possible. To continue decrease the risk of community spread and exposure, you and any members of your household should limit activities in public for 14 days after starting home isolation.     You can reference the following CDC link for helpful home isolation/care tips:  https://www.cdc.gov/coronavirus/2019-ncov/downloads/10Things.pdf    Protect Others:    Cover Your Mouth and Nose with a mask, disposable tissue or wash cloth to avoid spreading germs to others.    Wash your hands and face frequently with soap and water    Call Back If: Breathing difficulty develops or you become worse.    For more information about COVID19 and options for caring for yourself at home, please visit the CDC website at https://www.cdc.gov/coronavirus/2019-ncov/about/steps-when-sick.html  For more options for care at [facility], please visit our website at [email or web address]    For more information, please use the [place] Department of Health COVID-19 Website: https://www.health.state.[place]/diseases/coronavirus/index.html  [place] Department of Health ([facility]) COVID-19 Hotlines (Interpreters available):      Health questions: Phone Number: [phone] or [phone] and Hours: 7 a.m. to 7 p.m.    Schools and  questions: Phone Number: [phone] or [phone] and Hours 7 a.m. to 7 p.m.    Leatha Lindsay RN      Reason for Disposition    MODERATE difficulty breathing (e.g., speaks in phrases, SOB even at rest, pulse 100-120) of new onset or worse than normal    Wheezing can be heard across the room    Any history of prior  "\"blood clot\" in leg or lungs    Recent illness requiring prolonged bedrest (i.e., immobilization)    Hip or leg fracture in past 2 months (e.g., or had cast on leg or ankle)    Answer Assessment - Initial Assessment Questions  1. RESPIRATORY STATUS: \"Describe your breathing?\" (e.g., wheezing, shortness of breath, unable to speak, severe coughing)     Wheezing, Moderate shortness of breath, speaking in short phrases. Patient states she's had cold the last few days.  2. ONSET: \"When did this breathing problem begin?\"       She's always had breathing difficulty d/t chronic COPD. shortness of breath was exacerbated after a fall + hip fracture in mid march. It became even worse a couple days ago when patient started getting a cold.  3. PATTERN \"Does the difficult breathing come and go, or has it been constant since it started?\"      Pretty constant for the last couple days.  4. SEVERITY: \"How bad is your breathing?\" (e.g., mild, moderate, severe)     - MILD: No SOB at rest, mild SOB with walking, speaks normally in sentences, can lay down, no retractions, pulse < 100.     - MODERATE: SOB at rest, SOB with minimal exertion and prefers to sit, cannot lie down flat, speaks in phrases, mild retractions, audible wheezing, pulse 100-120.     - SEVERE: Very SOB at rest, speaks in single words, struggling to breathe, sitting hunched forward, retractions, pulse > 120      Moderate to severe. Speaking in short phrases. shortness of breath at rest. Audible wheezing.     7. LUNG HISTORY: \"Do you have any history of lung disease?\"  (e.g., pulmonary embolus, asthma, emphysema)      COPD  8. CAUSE: \"What do you think is causing the breathing problem?\"       \"I have a cold.\"  9. OTHER SYMPTOMS: \"Do you have any other symptoms? (e.g., dizziness, runny nose, cough, chest pain, fever)      Denies chest pain or dizziness. Patient has mild cough and thinks she may have fevers too.  10. PREGNANCY: \"Is there any chance you are pregnant?\" \"When " "was your last menstrual period?\"        No.  11. TRAVEL: \"Have you traveled out of the country in the last month?\" (e.g., travel history, exposures)        No    Protocols used: BREATHING DIFFICULTY-A-OH      "

## 2021-06-07 NOTE — TELEPHONE ENCOUNTER
RN triage   Call from pt   Pt states she had hip surgery and then TCU -- home from TCU on 4/18 --- felt OK then   Pt states she has COPD and usually on 2 lpm 02 --   Pt states feeling worse since yesterday --- cough and increased shortness of breath ---   Shortness of breath even at rest   Pt is audibly wheezing and speaking in phrases's   Denies chest pain   States sats are 91 now   States she has used her inhaler and not helping   Per protocol =advised pt to go to ED   Pt requesting PCP advice   Please advise   Sharri Garcia RN [facility] RN triage      Reason for Disposition    MODERATE difficulty breathing (e.g., speaks in phrases, SOB even at rest, pulse 100-120) of new onset or worse than normal    Wheezing can be heard across the room    Protocols used: BREATHING DIFFICULTY-A-OH

## 2021-06-07 NOTE — TELEPHONE ENCOUNTER
Orders being requested: Home Care    Occupational Therapy    2 times a week for 4 weeks    Reason service is needed/diagnosis: to work on ADLs and therapeutic exercise and activity .   When are orders needed by: 4/22/20  Where to send Orders: Phone:  Verbal orders to caller  Okay to leave detailed message?  Yes

## 2021-06-07 NOTE — TELEPHONE ENCOUNTER
Spoke with the patient and relayed message below from Dr. Wooten.  Patient has been scheduled for a phone visit with Dr. Wooten this afternoon.  She had no further questions at this time.  Fadumo BARR CMA/GRECIA....................10:46 AM

## 2021-06-07 NOTE — TELEPHONE ENCOUNTER
Refill Approved    Rx renewed per Medication Renewal Policy. Medication was last renewed on 3/23/19.    Cherry Chaudhry, [facility] Triage/Med Refill 4/20/2020     Requested Prescriptions   Pending Prescriptions Disp Refills     metFORMIN (GLUCOPHAGE) 500 MG tablet [Pharmacy Med Name: METFORMIN 500MG TABLETS] 360 tablet 3     Sig: TAKE 2 TABLETS(1000 MG) BY MOUTH TWICE DAILY WITH MEALS       Metformin Refill Protocol Failed - 4/18/2020  2:11 PM        Failed - Microalbumin in last year      Microalbumin, Random Urine   Date Value Ref Range Status   09/18/2018 1.90 0.00 - 1.99 mg/dL Final                  Passed - Blood pressure in last 12 months     BP Readings from Last 1 Encounters:   03/20/20 121/58             Passed - LFT or AST or ALT in last 12 months     Albumin   Date Value Ref Range Status   01/23/2020 3.7 3.5 - 5.0 g/dL Final     Bilirubin, Total   Date Value Ref Range Status   01/23/2020 0.3 0.0 - 1.0 mg/dL Final     Bilirubin, Direct   Date Value Ref Range Status   08/01/2017 <0.1 <=0.5 mg/dL Final     Alkaline Phosphatase   Date Value Ref Range Status   01/23/2020 105 45 - 120 U/L Final     AST   Date Value Ref Range Status   01/23/2020 11 0 - 40 U/L Final     ALT   Date Value Ref Range Status   01/23/2020 10 0 - 45 U/L Final     Protein, Total   Date Value Ref Range Status   01/23/2020 5.9 (L) 6.0 - 8.0 g/dL Final                Passed - GFR or Serum Creatinine in last 6 months     GFR MDRD Non Af Amer   Date Value Ref Range Status   03/26/2020 38 (L) >60 mL/min/1.73m2 Final     GFR MDRD Af Amer   Date Value Ref Range Status   03/26/2020 46 (L) >60 mL/min/1.73m2 Final             Passed - Visit with PCP or prescribing provider visit in last 6 months or next 3 months     Last office visit with prescriber/PCP: 1/23/2020 OR same dept: 1/23/2020 Dar Wooten MD OR same specialty: 1/23/2020 Dar Wooten MD Last physical: Visit date not found Last MT visit: Visit date not found          Next appt within 3 mo: Visit date not found  Next physical within 3 mo: Visit date not found  Prescriber OR PCP: Dar Wooten MD  Last diagnosis associated with med order: 1. Diabetes mellitus, type 2 (H)  - metFORMIN (GLUCOPHAGE) 500 MG tablet [Pharmacy Med Name: METFORMIN 500MG TABLETS]; TAKE 2 TABLETS(1000 MG) BY MOUTH TWICE DAILY WITH MEALS  Dispense: 360 tablet; Refill: 3    2. Pure hypercholesterolemia  - atorvastatin (LIPITOR) 10 MG tablet [Pharmacy Med Name: ATORVASTATIN 10MG TABLETS]; TAKE 1 TABLET(10 MG) BY MOUTH DAILY  Dispense: 90 tablet; Refill: 3     If protocol passes may refill for 12 months if within 3 months of last provider visit (or a total of 15 months).           Passed - A1C in last 6 months     Hemoglobin A1c   Date Value Ref Range Status   03/19/2020 6.3 (H) <=5.6 % Final     Comment:     Prediabetes:   HBA1c       5.7 to 6.4%        Diabetes:        HBA1c        >=6.5%   Patients with Hgb F >5%, total bilirubin >10.0 mg/dL, abnormal red cell turnover, severe renal or hepatic disease or malignancy should not have this A1C method used to diagnose or monitor diabetes.                      atorvastatin (LIPITOR) 10 MG tablet [Pharmacy Med Name: ATORVASTATIN 10MG TABLETS] 90 tablet 3     Sig: TAKE 1 TABLET(10 MG) BY MOUTH DAILY       Statins Refill Protocol (Hmg CoA Reductase Inhibitors) Passed - 4/18/2020  2:11 PM        Passed - PCP or prescribing provider visit in past 12 months      Last office visit with prescriber/PCP: 1/23/2020 Dar Wooten MD OR same dept: 1/23/2020 Dar Wooten MD OR same specialty: 1/23/2020 Dar Wooten MD  Last physical: 9/14/2017 Last MTM visit: Visit date not found   Next visit within 3 mo: Visit date not found  Next physical within 3 mo: Visit date not found  Prescriber OR PCP: Dar Wooten MD  Last diagnosis associated with med order: 1. Diabetes mellitus, type 2 (H)  - metFORMIN (GLUCOPHAGE) 500 MG tablet [Pharmacy Med  Name: METFORMIN 500MG TABLETS]; TAKE 2 TABLETS(1000 MG) BY MOUTH TWICE DAILY WITH MEALS  Dispense: 360 tablet; Refill: 3    2. Pure hypercholesterolemia  - atorvastatin (LIPITOR) 10 MG tablet [Pharmacy Med Name: ATORVASTATIN 10MG TABLETS]; TAKE 1 TABLET(10 MG) BY MOUTH DAILY  Dispense: 90 tablet; Refill: 3    If protocol passes may refill for 12 months if within 3 months of last provider visit (or a total of 15 months).                metFORMIN (GLUCOPHAGE) 500 MG tablet [[id number]]     Electronically signed by: Dar Wooten MD on 03/25/19 1544  Status: Discontinued    Ordering user: Dar Wooten MD 03/25/19 1544  Authorized by: Dar Wooten MD    Frequency: BID with meals 03/25/19 - 365  days  Discontinued by: Haider Mace, Casey 03/17/20 0738 [Therapy completed]

## 2021-06-07 NOTE — TELEPHONE ENCOUNTER
Since she has oxygen and nebs at home, and her sats are over 90, we can easily handle this at home    Please arrange phone or video visit ASAP

## 2021-06-07 NOTE — PROGRESS NOTES
ASSESSMENT:  1. Acute bronchitis due to other specified organisms  Symptoms suggest sinusitis or bronchitis.  Short duration could be viral but with tenuous status will treat.  Her subjective opinion and my objective listening indicates worsening from baseline but not such that she needs to go to the emergency room or the hospital.  With recent bed rest consider possibility of blood clot  - predniSONE (DELTASONE) 20 MG tablet; Take 20 mg by mouth daily for 7 days.  Dispense: 7 tablet; Refill: 0  - doxycycline (MONODOX) 100 MG capsule; Take 1 capsule (100 mg total) by mouth 2 (two) times a day for 10 days.  Dispense: 20 capsule; Refill: 0    2. Chronic obstructive pulmonary disease, unspecified COPD type (H)  Recommend taking inhaler routinely.  Continue nebs as needed.  Oxygen is available  - fluticasone propion-salmeteroL (ADVAIR DISKUS) 500-50 mcg/dose DISKUS; Inhale 1 puff 2 (two) times a day.  Dispense: 3 each; Refill: 3    3. Closed intertrochanteric fracture, right, initial encounter (H)  Reviewed hospitalization, surgical repair, and TCU    4. Essential hypertension with goal blood pressure less than 140/90  Stable    5. Type 2 diabetes mellitus with stage 3 chronic kidney disease, without long-term current use of insulin (H)  Stable.  Previous discussion with Pharm.D. regarding exactly which medications she stopped.  Medicine list not available but need to coordinate with daughter    Full follow-up next week    Preventive Health Care:      PLAN:  Patient Instructions   Increase Advair to twice daily scheduled    Doxycycline 100 mg twice daily x10 days    Prednisone 20 mg each morning x7 days    Phone check-in April 24    Formal phone/video visit next week for hospital follow-up      No orders of the defined types were placed in this encounter.      Return in about 1 week (around 4/29/2020).    CHIEF COMPLAINT:  Chief Complaint   Patient presents with     Cough     Light beige phlegm, runny nose, no fever,  wheezing for 2 days       HISTORY OF PRESENT ILLNESS:  Lupe is a 80 y.o. female calling in to the clinic today reporting worsening cough and breathing.  She was in the hospital in March with a hip fracture and underwent surgical repair.  She was discharged to the TCU on , and was discharged home .  She immediately became ill with a head cold.  She complains of a sore throat, raspy throat, worsening shortness of breath, and a cough productive of beige phlegm.  She denies fever or chills.  No purulent rhinorrhea.  Her  has not been sick.  She has oxygen and nebs which helped her symptoms.  She requests an antibiotic and steroids.  She called the nursing line and was referred to the emergency room but she refused    Since home, her daughter has been setting up her medications.  She has had 1 low blood sugar reaction.  She has been sleeping well.  She feels weakness with her recent infection and has been unable to get along with her exercises    REVIEW OF SYSTEMS:   Slight edema a few days ago which has resolved.  No fever.  All other systems are negative.    PFSH:  Her  Nino has been recently diagnosed with prostate cancer    TOBACCO USE:  Social History     Tobacco Use   Smoking Status Former Smoker     Last attempt to quit: 1960     Years since quittin.8   Smokeless Tobacco Never Used       VITALS:  Stated Blood Pressure    Stated Pulse    Stated Weight stable    PHYSICAL EXAM:  (observations via phone)  Short of breath.  Speaking in full sentences but only 1-2 sentences at a time.  Throat raspy.  Coherent.      ADDITIONAL HISTORY SUMMARIZED (2): Reviewed hospitalization and discharge summary and nursing phone note  CARE EVERYWHERE/ EXTRA INFORMATION (1):   RADIOLOGY TESTS (1):   LABS (1): Last labs in the hospital.  Diabetes up-to-date  CARDIOLOGY/MEDICINE TESTS (1):   INDEPENDENT REVIEW (2 each):     Total data points:3    The visit lasted a total of 13 minutes   CA  intake time  5 minutes    Telephone Consent was completed by  staff and confirmed by Cailin FUENTES have reviewed and updated the patient's Past Medical History, Social History, Family History as appropriate.    MEDICATIONS: Reviewed and updated per CA and MD  Current Outpatient Medications   Medication Sig Dispense Refill     acetaminophen (TYLENOL) 500 MG tablet Take 2 tablets (1,000 mg total) by mouth 3 (three) times a day.  0     albuterol (PROAIR HFA) 90 mcg/actuation inhaler Inhale 2 puffs every 6 (six) hours as needed for wheezing. 1 Inhaler 3     allopurinol (ZYLOPRIM) 300 MG tablet TAKE 1 TABLET(300 MG) BY MOUTH DAILY 90 tablet 3     ARIPiprazole (ABILIFY) 2 MG tablet Take 1 tablet (2 mg total) by mouth daily. 90 tablet 3     aspirin 81 mg chewable tablet Chew 1 tablet (81 mg total) 2 (two) times a day.  0     atorvastatin (LIPITOR) 10 MG tablet TAKE 1 TABLET(10 MG) BY MOUTH DAILY 90 tablet 2     blood glucose test (ACCU-CHEK OLY PLUS TEST STRP) strips TEST THREE TIMES DAILY 300 strip 3     buPROPion (WELLBUTRIN SR) 100 MG 12 hr tablet Take 1 tablet (100 mg total) by mouth 2 (two) times a day. 180 tablet 3     calcium citrate (CALCITRATE) 200 mg (950 mg) tablet Take 1 tablet by mouth 2 (two) times a day.       cholecalciferol, vitamin D3, 1,000 unit (25 mcg) tablet Take 1,000 Units by mouth daily.       famotidine (PEPCID) 20 MG tablet Take 1 tablet (20 mg total) by mouth 2 (two) times a day. (Patient taking differently: Take 20 mg by mouth 2 (two) times a day as needed. ) 180 tablet 1     FLUoxetine (PROZAC) 20 MG capsule TAKE 1 CAPSULE(20 MG) BY MOUTH TWICE DAILY 180 capsule 1     fluticasone propion-salmeteroL (ADVAIR DISKUS) 500-50 mcg/dose DISKUS Inhale 1 puff 2 (two) times a day. 3 each 3     furosemide (LASIX) 40 MG tablet Take 1 tablet (40 mg total) by mouth daily as needed (leg swelling). 90 tablet 3     ipratropium-albuterol (DUO-NEB) 0.5-2.5 mg/3 mL nebulizer Take 3 mL by nebulization  "every 6 (six) hours as needed.        LORazepam (ATIVAN) 1 MG tablet Take 1 tablet (1 mg total) by mouth daily before breakfast. 30 tablet 0     metoprolol succinate (TOPROL-XL) 50 MG 24 hr tablet TAKE 1 TABLET(50 MG) BY MOUTH AT BEDTIME 90 tablet 3     nitroglycerin (NITROSTAT) 0.4 MG SL tablet Place 0.4 mg under the tongue every 5 (five) minutes as needed for chest pain.       oxyCODONE (ROXICODONE) 5 MG immediate release tablet Take 0.5-1 tablets (2.5-5 mg total) by mouth every 4 (four) hours as needed. 15 tablet 0     OXYGEN-AIR DELIVERY SYSTEMS MISC 2 L/min into each nostril as needed (with activity). Indications: use with activity to maintain sats > 90%       potassium chloride (K-DUR,KLOR-CON) 20 MEQ tablet Take 1 tablet (20 mEq total) by mouth daily. 90 tablet 1     senna-docusate (PERICOLACE) 8.6-50 mg tablet Take 1 tablet by mouth 2 (two) times a day.  0     vitamin E 400 unit capsule Take 400 Units by mouth daily.       doxycycline (MONODOX) 100 MG capsule Take 1 capsule (100 mg total) by mouth 2 (two) times a day for 10 days. 20 capsule 0     predniSONE (DELTASONE) 20 MG tablet Take 20 mg by mouth daily for 7 days. 7 tablet 0     No current facility-administered medications for this visit.            The patient has been notified of following:     \"This telephone visit will be conducted via a call between you and your physician/provider. We have found that certain health care needs can be provided without the need for a physical exam.  This service lets us provide the care you need with a short phone conversation.  If a prescription is necessary we can send it directly to your pharmacy.  If lab work is needed we can place an order for that and you can then stop by our lab to have the test done at a later time.    If during the course of the call the physician/provider feels a telephone visit is not appropriate, you will not be charged for this service.\"   "

## 2021-06-07 NOTE — PATIENT INSTRUCTIONS - HE
Increase Advair to twice daily scheduled    Doxycycline 100 mg twice daily x10 days    Prednisone 20 mg each morning x7 days    Phone check-in April 24    Formal phone/video visit next week for hospital follow-up

## 2021-06-08 NOTE — PROGRESS NOTES
ASSESSMENT:  1. Controlled type 2 diabetes mellitus without complication, without long-term current use of insulin  Stable.  Has not used insulin in several years  - Glycosylated Hemoglobin A1c  - Microalbumin, Random Urine    2. Anxiety state  Her episodes of anxiety sound more like biliary or GI colic.  Consider as below.  Consider increase in Prozac further.  Discontinue bupropion  - Thyroid Stimulating Hormone (TSH)    3. NSTEMI (non-ST elevated myocardial infarction)  Her symptoms are not exertional    4. Lymphedema      5. Essential hypertension with goal blood pressure less than 140/90  Stable    6. Pure hypercholesterolemia  No improvement off Lipitor.  Resume Lipitor    7. Intermittent abdominal pain  Attacks of abdominal pain do not sound like simply anxiety.  Consider recurrence of renal colic area and consider intermittent bowel obstruction from umbilical hernia.  Consider adhesive bowel disease.  Status post cholecystectomy.  Consider scar tissue    Check CT scan.  Evaluate as needed based on results  - Comprehensive Metabolic Panel  - HM2(CBC w/o Differential)  - CT Abdomen Pelvis With Oral With IV Contrast; Future  - Lipase  - Amylase  - Urinalysis-UC if Indicated        PLAN:  Patient Instructions   Stop Bupropion    Keep using the lorazepam as needed, but it is probably decreasing some sort of spasm which is why it is helping    The attacks could be another kidney stone, hernia, bile stones, scar tissue    Ok to take naproxen 500 mgs once a day with food for back pain    Because stopping Atorvastatin/lipitor did not help the back, go back on it, but at lower dose of 10 mgs daily    CT scan of abdomen and pelvis to explore the possibilities    Blood tests to explore these issues    If it is kidney stone, see our [facility]    If there are no kidney stones, and the labs are normal, ?? See Dr Valdez again to discuss fixing the umbilical hernia          Orders Placed This Encounter    Procedures     Culture, Urine     CT Abdomen Pelvis With Oral With IV Contrast     Standing Status:   Future     Standing Expiration Date:   1/26/2018     Order Specific Question:   Can the procedure be changed per Radiologist protocol?     Answer:   Yes     Glycosylated Hemoglobin A1c     Comprehensive Metabolic Panel     HM2(CBC w/o Differential)     Thyroid Stimulating Hormone (TSH)     Lipase     Amylase     Urinalysis-UC if Indicated     Microalbumin, Random Urine     Medications Discontinued During This Encounter   Medication Reason     buPROPion (WELLBUTRIN) 100 MG tablet Duplicate order     LORazepam (ATIVAN) 1 MG tablet Duplicate order     predniSONE (DELTASONE) 10 MG tablet Therapy completed     traMADol (ULTRAM-ER) 100 MG 24 hr tablet Therapy completed     pioglitazone (ACTOS) 30 MG tablet Duplicate order     DILT- mg 24 hr capsule Duplicate order     POTASSIUM CHLORIDE (KLOR-CON M20 ORAL) Duplicate order     metFORMIN (GLUCOPHAGE) 500 MG tablet Reorder     glimepiride (AMARYL) 1 MG tablet Reorder     isosorbide mononitrate (IMDUR) 30 MG 24 hr tablet Reorder     furosemide (LASIX) 40 MG tablet Reorder     atorvastatin (LIPITOR) 40 MG tablet Reorder     pioglitazone (ACTOS) 30 MG tablet Reorder     sitaGLIPtin (JANUVIA) 100 MG tablet Reorder     diltiazem (DILACOR XR) 240 MG 24 hr capsule Reorder     buPROPion (WELLBUTRIN SR) 100 MG 12 hr tablet Therapy completed     buPROPion (WELLBUTRIN SR) 100 MG 12 hr tablet Therapy completed       No Follow-up on file.    CHIEF COMPLAINT:  Chief Complaint   Patient presents with     Follow-up     med check     Medication Problem     pt is wanting to see if she can get her Lorazepam to be BID instead of once daily. She is often needing it 2 times a day and sometimes TID     Medication Problem     pt has not been taking Atorvastatin. or Actos     Medication Refill     pt wondering if she can get an Rx for Naproxen- she took one of her husbands and it really  "helped her back pain     Colonoscopy     pt is due. also for microalbumin       HISTORY OF PRESENT ILLNESS:  Lupe is a 76 y.o. female presenting to the clinic today with her daughter, Prema, to follow up regarding her back pain, anxiety, hypertension, and diabetes.     Back pain: She has tried taking her 's naproxen for back pain. She has been taking 1 per day with significant improvement in back pain. She would like to receive a prescription for this medication. She used to take ibuprofen for back pain. She did stop atorvastatin, which did not improve her back pain. She is no longer taking Vicodin for pain.     Anxiety: At her last visit, her Prozac was increased to 40 mg. She has been managing better on this dose. Her daughter is not sure if her mood has improved overall. She has noticed that over the last 4 months, she has been taking lorazepam up to 3 times per day. She typically takes 1 lorazepam in the morning and 1 at night. She notes that she has a long history of anxiety, although she only recalls 2 major panic attacks, which happened before she started taking medication for her anxiety. Two years ago, she had been on Paxil, but this was changed due to tremor. Her daughter recalls that last year when she was hospitalized for MI, she had been using more lorazepam. Her daughter notes that she has episodes of shortness of breath and anxiety in which she asks her  to sit with her until the feeling passes. This can typically last 30 minutes. Her last episode occurred last Thursday, 1/19/17. She had been sitting down, reading at the time when she suddenly felt very anxious, describing it as though \"the anxiety closed in on her\". She had just eaten dinner. She had tried to relax and calm herself down with breathing exercises, but needed to take an extra lorazepam. She describes feeling a strange discomfort in her abdomen when her anxiety flares, though she denies pain. She describes it as a " "tightening or cramping sensation. Her daughter notes that she becomes frustrated that she cannot do all of the activities she used to do in the past. She denies shortness of breath with walking up the stairs. Her last episode prior to last week occurred on 1/2/17. She had suddenly felt very anxious, took a lorazepam, and did breathing exercises to calm herself. This also lasted 30 minutes.     Hypertension: Her blood pressure is well controlled. She has not had concerns of low blood pressure, and denies dizziness or lightheadedness.     Diabetes: She denies low blood sugar reactions, and is managing well on Januvia. She has been eating well. She checks her blood sugars at home. Her last A1c was 6.8 on 8/4/16.     Gastroesophageal reflux: No recent concerns of acid heartburn. She is taking omeprazole twice daily.    Chest pain: This is resolved .Breathing is well controlled. Her daughter has noticed more shortness of breath with exertion lately as compared to last year.     REVIEW OF SYSTEMS:   She denies stomach upset. She falls asleep in her chair at night. All other systems are negative.    PFSH:  She has not had her gallbladder removed.     TOBACCO USE:  History   Smoking Status     Former Smoker     Quit date: 6/19/1960   Smokeless Tobacco     Not on file       VITALS:  Vitals:    01/26/17 1115   BP: 126/58   Patient Site: Left Arm   Patient Position: Sitting   Cuff Size: Adult Regular   Pulse: 66   Resp: 14   Weight: 207 lb 14.4 oz (94.3 kg)   Height: 5' 2.5\" (1.588 m)     Wt Readings from Last 3 Encounters:   01/26/17 207 lb 14.4 oz (94.3 kg)   08/04/16 205 lb 9.6 oz (93.3 kg)   06/06/16 198 lb (89.8 kg)     Body mass index is 37.42 kg/(m^2).    PHYSICAL EXAM:  Constitutional:  Reveals an alert, pleasant, talkative woman.  Vitals:  Per nursing notes.  Cardiac:  Regular rate and rhythm without murmurs, rubs, or gallops.  Legs without edema.   Lungs: Crackles in the left. No wheezes.   Abdomen:   Bowel sounds " positive. Tenderness to left lower quadrant. Neither liver nor spleen palpable. Small umbilical hernia, minimally protuberant, easily reducible.   Neurologic:  Cranial nerves II-XII intact.     Psychiatric:  Mood appropriate, memory intact.     ADDITIONAL HISTORY SUMMARIZED (2): Reviewed physical exam from 9/15//11 regarding laparoscopic gallbladder removal. Reviewed office note from 6/29/06 regarding gallstones. Reviewed office note from 2/7/12 regarding anxiety and medications.   DECISION TO OBTAIN EXTRA INFORMATION (1): None.   RADIOLOGY TESTS (1): Reviewed CT of abdomen from 3/1/12. Ordered abdominal CT.   LABS (1): Ordered labs today.   MEDICINE TESTS (1): None.  INDEPENDENT REVIEW (2 each): None.     The visit lasted a total of 50 minutes face to face with the patient. Over 50% of the time was spent counseling and educating the patient about anxiety.    IHemalatha, am scribing for and in the presence of, Dr. Wooten.    I, Dr. Wooten, personally performed the services described in this documentation, as scribed by Hemalatha Stafford in my presence, and it is both accurate and complete.    MEDICATIONS:  Current Outpatient Prescriptions   Medication Sig Dispense Refill     ACCU-CHEK OLY PLUS TEST STRP Strp TEST THREE TIMES DAILY 300 strip 13     ADVAIR DISKUS 500-50 mcg/dose DISKUS INHALE 1 PUFF BY MOUTH TWICE DAILY 60 each 6     albuterol (PROAIR HFA) 90 mcg/actuation inhaler Inhale 2 puffs every 6 (six) hours as needed for wheezing. 1 Inhaler 11     aspirin 81 MG EC tablet Take 81 mg by mouth daily.       cyanocobalamin (VITAMIN B-12) 500 MCG tablet Take 500 mcg by mouth daily.       diltiazem (DILACOR XR) 240 MG 24 hr capsule Take 1 capsule (240 mg total) by mouth daily. 90 capsule 3     docusate sodium (COLACE) 50 MG capsule Take 100 mg by mouth daily as needed for constipation.       FLUoxetine (PROZAC) 20 MG capsule Take 2 capsules (40 mg total) by mouth daily. 180 capsule 3      fluticasone-salmeterol (ADVAIR DISKUS) 500-50 mcg/dose DISKUS Inhale 1 puff daily. 3 each 3     furosemide (LASIX) 40 MG tablet TAKE 1 TABLET BY MOUTH DAILY 90 tablet 3     glimepiride (AMARYL) 1 MG tablet Take 1 tablet (1 mg total) by mouth daily. 90 tablet 3     ipratropium-albuterol (DUO-NEB) 0.5-2.5 mg/3 mL nebulizer NEBULIZE ONE VIAL BY MOUTH FOUR TIMES DAILY 1080 mL 0     isosorbide mononitrate (IMDUR) 30 MG 24 hr tablet TAKE ONE TABLET BY MOUTH DAILY 90 tablet 3     JANUVIA 100 mg tablet TAKE 1 TABLET BY MOUTH DAILY 90 tablet 1     LORazepam (ATIVAN) 1 MG tablet TAKE 1 TABLET BY MOUTH TWICE DAILY AS NEEDED 30 tablet 1     metFORMIN (GLUCOPHAGE) 500 MG tablet TAKE 1 TABLET BY MOUTH THREE TIMES DAILY 270 tablet 3     nitroglycerin (NITROSTAT) 0.4 MG SL tablet ONE TABLET UNDER TONGUE AS NEEDED FOR CHEST PAIN EVERY 5 MINUTES AS NEEDED 90 tablet 0     OMEGA-3/DHA/EPA/FISH OIL (FISH OIL-OMEGA-3 FATTY ACIDS) 300-1,000 mg capsule Take 2 g by mouth daily.       omeprazole (PRILOSEC) 20 MG capsule Take 1 capsule (20 mg total) by mouth 2 (two) times a day. 180 capsule 3     potassium chloride SA (K-DUR,KLOR-CON) 20 MEQ tablet TAKE 1 TABLET BY MOUTH DAILY 90 tablet 3     sitaGLIPtin (JANUVIA) 100 MG tablet Take 1 tablet (100 mg total) by mouth daily. 90 tablet 3     atorvastatin (LIPITOR) 10 MG tablet Take 1 tablet (10 mg total) by mouth daily. 90 tablet 3     naproxen (NAPROSYN) 500 MG tablet Take 1 tablet (500 mg total) by mouth daily. 90 tablet 3     ondansetron (ZOFRAN, AS HYDROCHLORIDE,) 4 MG tablet Take 1 tablet (4 mg total) by mouth daily as needed for nausea. 30 tablet 1     pioglitazone (ACTOS) 30 MG tablet TAKE 1 TABLET BY MOUTH DAILY 90 tablet 3     No current facility-administered medications for this visit.        Total data points: 4

## 2021-06-09 NOTE — PROGRESS NOTES
ASSESSMENT:  1. SOB (shortness of breath)  No pneumonia.  No mass.  EKG normal.  She insists that this is anxiety.  I am skeptical but we will proceed in that direction since x-ray and EKG today and labs last month were normal  - XR Chest PA and Lateral  - Electrocardiogram Perform - Clinic    2. Anxiety state  Will treat for anxiety.  Wish Prozac to dosage range of 60-80 mg.  Okay for lorazepam.  Resume bupropion which seems to have helped in the past and worsened since we stopped  - FLUoxetine (PROZAC) 20 MG capsule; Take 3 capsules (60 mg total) by mouth daily.  Dispense: 270 capsule; Refill: 3  - LORazepam (ATIVAN) 1 MG tablet; TAKE 1 TABLET BY MOUTH TWICE DAILY AS NEEDED  Dispense: 60 tablet; Refill: 1    3. Controlled type 2 diabetes mellitus without complication, without long-term current use of insulin  Stable.  Could she be having atypical hypoglycemia.  Trial off glimepiride        PLAN:  Patient Instructions   Is this anxiety or depression?  If so, we should increase prozac to 60 mgs, or 80 mgs    Should we add back the Bupropion?    Is this heart or lung, or nerves?    The blood tests from last time looked really good    Should we decrease the diabetes pills?        The chest xray looks good    The EKG looks good    We have to therefore assume this is all nerves      So:    Stop the Glimiperide diabetes pill.  Continue Metformin, the Januvia and the Pioglitazone    Resume the Bupropion but at a slightly higher dose of 150 mgs each morning    Increase the prozac to 3 pills daily to equal 60 mgs.  The max dose for anxiety is 80 mgs    Take the Lorazepam up to twice daily as needed.  But, if this works, I expect to hear that you are actually needed less    If you are not significantly better within 2 weeks, we need CT scans      Orders Placed This Encounter   Procedures     XR Chest PA and Lateral     Order Specific Question:   Can the procedure be changed per Radiologist protocol?     Answer:   Yes      Electrocardiogram Perform - Clinic     Medications Discontinued During This Encounter   Medication Reason     LORazepam (ATIVAN) 1 MG tablet Reorder     glimepiride (AMARYL) 1 MG tablet      FLUoxetine (PROZAC) 20 MG capsule Reorder     LORazepam (ATIVAN) 1 MG tablet Reorder       No Follow-up on file.    CHIEF COMPLAINT:  Chief Complaint   Patient presents with     Anxiety     Panic Attack     pt is having more panic attacks     Medication Refill     pt would like an increase in her lorazepam       HISTORY OF PRESENT ILLNESS:  Lupe is a 77 y.o. female presenting to the clinic today for follow up on anxiety. She reports that she has been having an increased number of panic attacks. She adds that she has lost motivation to do many daily activities. She has felt extremely indolent and not felt like herself since tapering bupropion. She has been reluctant to talk on the telephone and has been weeping more frequently. She has been taking ativan in order to feel less nervous. She began to feel more panicked after stopping Bupropion in January. She continues to take fluoxetine 40 mg daily. She mentions that she has been cleaning her house out and this has caused substantial stress.     Diabetes: She denies any episodes of hypoglycemia. She has noticed that her blood glucose is much better since starting Januvia. Of note, she reports that she was diagnosed with an infection in her eye, apparently due to insulin use. She takes metformin 500 mg three time a day and denies any stomach discomfort. She also takes Amaryl 1 mg once daily.     Dyspnea: She has noticed that she becomes short of breath when the environment feels too hot. She has not been coughing, though she admits that every morning she expectorates thick yellow sputum. She began coughing up sputum about 2 months ago.     REVIEW OF SYSTEMS:   Back pain:  She reports that her back pain has been controlled with naproxen.   She denies any stomach discomfort.   All  other systems are negative.    PFSH:  She has been trying to clean and clear her house to put it on the market.     TOBACCO USE:  History   Smoking Status     Former Smoker     Quit date: 6/19/1960   Smokeless Tobacco     Not on file       VITALS:  Vitals:    03/13/17 1532 03/13/17 1543   BP: 144/60 118/48   Pulse: 80    SpO2: 96%    Weight: 212 lb 8 oz (96.4 kg)      Wt Readings from Last 3 Encounters:   03/13/17 212 lb 8 oz (96.4 kg)   01/26/17 207 lb 14.4 oz (94.3 kg)   08/04/16 205 lb 9.6 oz (93.3 kg)       PHYSICAL EXAM:  Constitutional:  Reveals an alert, pleasant, anxious elderly female.  Vitals:  Per nursing notes.  Cardiac:  Regular rate and rhythm without murmurs, rubs, or gallops. 1+ bilateral edema. Palpation of the radial pulse regular.  Lungs: Crackles in the base of right lobe.  Respiratory effort normal.  Abdomen:   Bowel sounds positive, nontender, nondistended.  Neither liver nor spleen palpable.  Skin:   Without rash, bruise, or palpable lesions.  Rheumatologic: Normal joints and nails of the hands.  Neurologic:  Cranial nerves II-XII intact.     Psychiatric:  Mood appropriate, memory intact.   X-ray:   Normal, no mass, no fluid, no infiltrates,   EKG:   Normal sinus rhythm    QUALITY MEASURES:  The following are part of a depression follow up plan for the patient:  mental health care management    ADDITIONAL HISTORY SUMMARIZED (2): Nurse triage notes reviewed from 3/13/2017 regarding anxiety.  DECISION TO OBTAIN EXTRA INFORMATION (1): None.   RADIOLOGY TESTS (1): Chest X-ray ordered.  LABS (1): Labs reviewed from 1/26/2017.  MEDICINE TESTS (1): EKG ordered.  INDEPENDENT REVIEW (2 each): EKG personally interpreted. X-ray personally interpreted.     The visit lasted a total of 30 minutes face to face with the patient. Over 50% of the time was spent counseling and educating the patient about anxiety and diabetic management.    Ethan FUENTES, am scribing for and in the presence of,   Je.    I, Dr. Wooten, personally performed the services described in this documentation, as scribed by Ethan Hoskins in my presence, and it is both accurate and complete.    MEDICATIONS:  Current Outpatient Prescriptions   Medication Sig Dispense Refill     ACCU-CHEK OLY PLUS TEST STRP Strp TEST THREE TIMES DAILY 300 strip 13     ADVAIR DISKUS 500-50 mcg/dose DISKUS INHALE 1 PUFF BY MOUTH TWICE DAILY 60 each 6     albuterol (PROAIR HFA) 90 mcg/actuation inhaler Inhale 2 puffs every 6 (six) hours as needed for wheezing. 1 Inhaler 11     aspirin 81 MG EC tablet Take 81 mg by mouth daily.       atorvastatin (LIPITOR) 10 MG tablet Take 1 tablet (10 mg total) by mouth daily. 90 tablet 3     cyanocobalamin (VITAMIN B-12) 500 MCG tablet Take 500 mcg by mouth daily.       diltiazem (DILACOR XR) 240 MG 24 hr capsule Take 1 capsule (240 mg total) by mouth daily. 90 capsule 3     docusate sodium (COLACE) 50 MG capsule Take 100 mg by mouth daily as needed for constipation.       FLUoxetine (PROZAC) 20 MG capsule Take 3 capsules (60 mg total) by mouth daily. 270 capsule 3     fluticasone-salmeterol (ADVAIR DISKUS) 500-50 mcg/dose DISKUS Inhale 1 puff daily. 3 each 3     furosemide (LASIX) 40 MG tablet TAKE 1 TABLET BY MOUTH DAILY 90 tablet 3     ipratropium-albuterol (DUO-NEB) 0.5-2.5 mg/3 mL nebulizer NEBULIZE ONE VIAL BY MOUTH FOUR TIMES DAILY 1080 mL 0     isosorbide mononitrate (IMDUR) 30 MG 24 hr tablet TAKE ONE TABLET BY MOUTH DAILY 90 tablet 3     JANUVIA 100 mg tablet TAKE 1 TABLET BY MOUTH DAILY 90 tablet 1     [START ON 3/17/2017] LORazepam (ATIVAN) 1 MG tablet TAKE 1 TABLET BY MOUTH TWICE DAILY AS NEEDED 60 tablet 1     metFORMIN (GLUCOPHAGE) 500 MG tablet TAKE 1 TABLET BY MOUTH THREE TIMES DAILY 270 tablet 3     naproxen (NAPROSYN) 500 MG tablet Take 1 tablet (500 mg total) by mouth daily. 90 tablet 3     nitroglycerin (NITROSTAT) 0.4 MG SL tablet ONE TABLET UNDER TONGUE AS NEEDED FOR CHEST PAIN EVERY 5  MINUTES AS NEEDED 90 tablet 0     OMEGA-3/DHA/EPA/FISH OIL (FISH OIL-OMEGA-3 FATTY ACIDS) 300-1,000 mg capsule Take 2 g by mouth daily.       omeprazole (PRILOSEC) 20 MG capsule Take 1 capsule (20 mg total) by mouth 2 (two) times a day. 180 capsule 3     ondansetron (ZOFRAN, AS HYDROCHLORIDE,) 4 MG tablet Take 1 tablet (4 mg total) by mouth daily as needed for nausea. 30 tablet 1     pioglitazone (ACTOS) 30 MG tablet TAKE 1 TABLET BY MOUTH DAILY 90 tablet 3     potassium chloride SA (K-DUR,KLOR-CON) 20 MEQ tablet TAKE 1 TABLET BY MOUTH DAILY 90 tablet 3     sitaGLIPtin (JANUVIA) 100 MG tablet Take 1 tablet (100 mg total) by mouth daily. 90 tablet 3     buPROPion (WELLBUTRIN XL) 150 MG 24 hr tablet Take 1 tablet (150 mg total) by mouth every morning. 30 tablet 5     No current facility-administered medications for this visit.        Total data points: 9.   TIME IN:4:03 P2M  TIME OUT: 4:23 PM  TIME IN: 5:10 PM  TIME OUT:  5:20 PM

## 2021-06-10 ENCOUNTER — COMMUNICATION - HEALTHEAST (OUTPATIENT)
Dept: GENERAL RADIOLOGY | Facility: CLINIC | Age: 81
End: 2021-06-10

## 2021-06-10 DIAGNOSIS — F41.9 ANXIETY: ICD-10-CM

## 2021-06-10 NOTE — TELEPHONE ENCOUNTER
Refill Approved    Rx renewed per Medication Renewal Policy. Medication was last renewed on 11/7/19, last OV 4/22/20.    Neyda Marshall, [facility] Triage/Med Refill 8/23/2020     Requested Prescriptions   Pending Prescriptions Disp Refills     FLUoxetine (PROZAC) 20 MG capsule [Pharmacy Med Name: FLUOXETINE 20MG CAPSULES] 180 capsule 1     Sig: TAKE 1 CAPSULE(20 MG) BY MOUTH TWICE DAILY       [facility] Refill Protocol  Passed - 8/20/2020  4:15 PM        Passed - PCP or prescribing provider visit in last year     Last office visit with prescriber/PCP: 1/23/2020 Dar Wooten MD OR same dept: 1/23/2020 Dar Wooten MD OR same specialty: 1/23/2020 Dar Wooten MD  Last physical: 9/14/2017 Last MTM visit: Visit date not found   Next visit within 3 mo: Visit date not found  Next physical within 3 mo: Visit date not found  Prescriber OR PCP: Dar Wooten MD  Last diagnosis associated with med order: 1. Anxiety  - FLUoxetine (PROZAC) 20 MG capsule [Pharmacy Med Name: FLUOXETINE 20MG CAPSULES]; TAKE 1 CAPSULE(20 MG) BY MOUTH TWICE DAILY  Dispense: 180 capsule; Refill: 1    If protocol passes may refill for 12 months if within 3 months of last provider visit (or a total of 15 months).

## 2021-06-11 RX ORDER — BUPROPION HYDROCHLORIDE 100 MG/1
100 TABLET, EXTENDED RELEASE ORAL 2 TIMES DAILY
Qty: 180 TABLET | Refills: 1 | Status: SHIPPED | OUTPATIENT
Start: 2021-06-11

## 2021-06-11 NOTE — TELEPHONE ENCOUNTER
RN cannot approve Refill Request    RN can NOT refill this medication med is not covered by policy/route to provider. Last office visit: 1/23/2020 Dar Wooten MD Last Physical: 9/14/2017 Last MTM visit: Visit date not found Last visit same specialty: 1/23/2020 Dar Wooten MD.  Next visit within 3 mo: Visit date not found  Next physical within 3 mo: Visit date not found      Cristal Dixon, [facility] Triage/Med Refill 8/29/2020    Requested Prescriptions   Pending Prescriptions Disp Refills     ARIPiprazole (ABILIFY) 2 MG tablet [Pharmacy Med Name: ARIPIPRAZOLE 2MG TABLETS] 90 tablet 3     Sig: TAKE 1 TABLET BY MOUTH ONCE DAILY       There is no refill protocol information for this order

## 2021-06-12 NOTE — TELEPHONE ENCOUNTER
"RN Triage:     Broken her hip several months ago, went to a nursing home. She stated she has open sores on her bottom. Per patient she has 2 open sores the size of a quarter. She is trying not to sit on the sores, able to walk. She was given a cream in the past. They are not draining, washes them three times a day. She uses a blue emu pain relief cream and calmoseptine. Redness around the open sore, no fever. NO visible red streaking. Patient did not think the sores looked infected. Daughter is a nurse and looks at it and says \"so far so good\". She has an appointment in a week.   Advised that if the sores are new and have never been seen by a provider to her she should be seen in the clinic today. She was agreeable to a visit today. Advised to wear a mask.       Reason for Disposition    Looks like a boil or deep ulcer    Large sore (> 1 inch or 2.5 cm across)    Additional Information    Negative: Sores on genital area    Negative: [1] Unexplained sores AND [2] 3 or more    Negative: [1] Small red streak or spreading redness (<2 inches; 5 cm) AND [2] no fever    Negative: [1] Red area or streak AND [2] fever    Negative: [1] Looks infected (spreading redness, pus) AND [2] large red area (> 2 in. or 5 cm)    Negative: [1] Red streak runs from sore AND [2] longer than 1 inch (2.5 cm)    Negative: [1] Ulcer with black scab AND [2] spreading AND [3] painless AND [4] cause unknown    Negative: Patient sounds very sick or weak to the triager    Negative: Followed an injury (i.e., from an abrasion or scratch)    Negative: Wound infection suspected (in traumatic or surgical wound)    Negative: Soft yellow scabs (crusts)    Negative: Followed a burn    Negative: Looks like insect bite    Negative: Looks like chickenpox    Negative: On one side of the lip    Negative: Looks like poison ivy    Negative: Looks like ringworm    Negative: Sounds like a life-threatening emergency to the triager    Negative: [1] Looks infected " (spreading redness, pus) AND [2] diabetes mellitus or weak immune system (e.g., HIV positive, cancer chemo, splenectomy, organ transplant, chronic steroids)    Protocols used: SORES-A-AH

## 2021-06-12 NOTE — TELEPHONE ENCOUNTER
Medication Request  Medication name: glipiZIDE (GLUCOTROL) 5 MG tablet   Requested Pharmacy: [facility]  Reason for request: Non active med  When did you use medication last?:  N/A  Patient offered appointment:  N/A - electronic request  Okay to leave a detailed message: yes

## 2021-06-12 NOTE — PROGRESS NOTES
Assessment/plan  1. Pressure injury of buttock, stage 2, unspecified laterality (H)  - silver sulfADIAZINE (SILVADENE, SSD) 1 % cream; Apply to affected area twice daily.  Dispense: 50 g; Refill: 1  2. Coronary atherosclerosis due to calcified coronary lesion  3. Type 2 diabetes mellitus with stage 3 chronic kidney disease, without long-term current use of insulin, unspecified whether stage 3a or 3b CKD (H)  EHR reviewed.   Past medical history, problem list, past surgical history, family history, social history, medications reviewed, updated, reconciled.   No signs of active infection or abscess.   Discussed wound care importance, care of pressure injury and prevention.   Patient is obviously immobilized for much of the day, but she remains positive that she can change her position with support.   Would care instructions given, was prescribed silvadene for use with nonstick dressing.   The wounds were dressed today with silvadene.   She was strongly urged to follow up on this early next week, by the end of the week with any provider if she feels her pain is worsening.   Encouraged to see her primary care provider for ongoing care of her chronic medical problems.           Subjective  [age] old female with concerns of a sore on her bottom.   It has been present for a couple weeks. She thinks it is present on both sides of her buttocks. The pain was getting worse. It is not bleeding or draining though she is not really sure how it looks now due to the location. She says her daughter did take a look and told her it was ok. She lives at home with her . She does admit to inactivity and sitting a lot lately since she broke her hip in March. EHR was reviewed.   She denies fever. Is tolerating diet. No other skin concerns.     ROS: 12 systems reviewed, all negative except for what is mentioned in HPI.   Past Medical History:   Diagnosis Date     Arthritis      Asthma      Cancer (H) 2011    right side of  abdomen--gets it checked every yr; stable so no tx     COPD (chronic obstructive pulmonary disease) (H)      Coronary artery disease      Depression      Diabetes mellitus (H)      Endometriosis     had hysterctomy     GERD (gastroesophageal reflux disease)      High cholesterol      Kidney stones      Lymphedema of left lower extremity      Pneumonia     had a lot as a child     Sleep apnea      Patient Active Problem List   Diagnosis     Nephrolithiasis     Esophageal reflux     Lower Back Pain     Iron Deficiency     Allergic Rhinitis     Coronary Artery Disease     Heller's Neuroma Of The Left Foot     Chronic obstructive pulmonary disease, unspecified COPD type (H)     Essential hypertension with goal blood pressure less than 140/90     Iron deficiency anemia due to chronic blood loss     Anxiety     Benign Tubular Adenoma Of The Large Intestine     Type 2 myocardial infarction without ST elevation (H)     Anemia due to chronic illness     Sleep apnea, obstructive     Lymphedema of left lower extremity     Pure hypercholesterolemia     Type 2 diabetes mellitus with stage 3 chronic kidney disease, without long-term current use of insulin (H)     Hx of CABG     Systolic murmur of aorta     Aortic stenosis, mild     CHF (congestive heart failure) (H)     Hypernephroma, left (H)     Renal cell cancer, left (H)     BMI 35.0-35.9, in adult (H)     History of coronary angiogram     Allergy to insulin     Closed intertrochanteric fracture, right, initial encounter (H)     Closed right hip fracture, initial encounter (H)     Chronic kidney disease, stage III (moderate)     Anemia due to blood loss, acute     Past Surgical History:   Procedure Laterality Date     CARDIAC CATHETERIZATION N/A 7/31/2017    Procedure: Coronary Angiogram;  Surgeon: Anthony Diaz MD;  Location: [facility];  Service:      CATARACT EXTRACTION Right      CHOLECYSTECTOMY  07/13/2006     CORONARY ARTERY BYPASS GRAFT       heart  bypass  2005     HYSTERECTOMY  1977     VA CABG, VEIN, SINGLE      Description: CABG (CABG);  Recorded: 2008;     VA CATH PLMT L HRT & ARTS W/NJX & ANGIO IMG S&I Left 2017    Procedure: Left Heart Catheterization Without Left Ventriculogram;  Surgeon: Anthony Diaz MD;  Location: [facility];  Service: Cardiology     VA LAP,CHOLECYSTECTOMY      Description: Cholecystectomy Laparoscopic;  Recorded: 2008;     VA TOTAL ABDOM HYSTERECTOMY      Description: Hysterectomy;  Recorded: 2008;     Family History   Problem Relation Age of Onset     Heart disease Mother      Diabetes Father      Cancer Sister      Heart attack Maternal Uncle      Heart attack Maternal Grandfather      Social History     Socioeconomic History     Marital status:      Spouse name: Not on file     Number of children: Not on file     Years of education: Not on file     Highest education level: Not on file   Occupational History     Not on file   Social Needs     Financial resource strain: Not on file     Food insecurity     Worry: Not on file     Inability: Not on file     Transportation needs     Medical: Not on file     Non-medical: Not on file   Tobacco Use     Smoking status: Former Smoker     Quit date: 1960     Years since quittin.4     Smokeless tobacco: Never Used   Substance and Sexual Activity     Alcohol use: No     Drug use: No     Sexual activity: Not on file   Lifestyle     Physical activity     Days per week: Not on file     Minutes per session: Not on file     Stress: Not on file   Relationships     Social connections     Talks on phone: Not on file     Gets together: Not on file     Attends Voodoo service: Not on file     Active member of club or organization: Not on file     Attends meetings of clubs or organizations: Not on file     Relationship status: Not on file     Intimate partner violence     Fear of current or ex partner: Not on file     Emotionally abused: Not on  file     Physically abused: Not on file     Forced sexual activity: Not on file   Other Topics Concern     Not on file   Social History Narrative     58+ years; 2 grown children     Current Outpatient Medications on File Prior to Visit   Medication Sig Dispense Refill     acetaminophen (TYLENOL) 500 MG tablet Take 2 tablets (1,000 mg total) by mouth 3 (three) times a day.  0     albuterol (PROAIR HFA) 90 mcg/actuation inhaler Inhale 2 puffs every 6 (six) hours as needed for wheezing. 1 Inhaler 3     allopurinol (ZYLOPRIM) 300 MG tablet TAKE 1 TABLET(300 MG) BY MOUTH DAILY 90 tablet 3     ARIPiprazole (ABILIFY) 2 MG tablet TAKE 1 TABLET BY MOUTH ONCE DAILY 90 tablet 3     aspirin 81 mg chewable tablet Chew 1 tablet (81 mg total) 2 (two) times a day.  0     atorvastatin (LIPITOR) 10 MG tablet TAKE 1 TABLET(10 MG) BY MOUTH DAILY 90 tablet 2     blood glucose test (ACCU-CHEK OLY PLUS TEST STRP) strips TEST THREE TIMES DAILY 300 strip 3     buPROPion (WELLBUTRIN SR) 100 MG 12 hr tablet Take 1 tablet (100 mg total) by mouth 2 (two) times a day. 180 tablet 1     calcium citrate (CALCITRATE) 200 mg (950 mg) tablet Take 1 tablet by mouth 2 (two) times a day.       cholecalciferol, vitamin D3, 1,000 unit (25 mcg) tablet Take 1,000 Units by mouth daily.       famotidine (PEPCID) 20 MG tablet Take 1 tablet (20 mg total) by mouth 2 (two) times a day. (Patient taking differently: Take 20 mg by mouth 2 (two) times a day as needed. ) 180 tablet 1     FLUoxetine (PROZAC) 20 MG capsule TAKE 1 CAPSULE(20 MG) BY MOUTH TWICE DAILY 180 capsule 2     fluticasone propion-salmeteroL (ADVAIR DISKUS) 500-50 mcg/dose DISKUS Inhale 1 puff 2 (two) times a day. 3 each 3     furosemide (LASIX) 40 MG tablet Take 1 tablet (40 mg total) by mouth daily as needed (leg swelling). 90 tablet 3     ipratropium-albuterol (DUO-NEB) 0.5-2.5 mg/3 mL nebulizer Take 3 mL by nebulization every 6 (six) hours as needed.        LORazepam (ATIVAN) 1 MG  tablet Take 1 tablet (1 mg total) by mouth daily before breakfast. 30 tablet 0     metoprolol succinate (TOPROL-XL) 50 MG 24 hr tablet TAKE 1 TABLET(50 MG) BY MOUTH AT BEDTIME 90 tablet 3     nitroglycerin (NITROSTAT) 0.4 MG SL tablet Place 0.4 mg under the tongue every 5 (five) minutes as needed for chest pain.       oxyCODONE (ROXICODONE) 5 MG immediate release tablet Take 0.5-1 tablets (2.5-5 mg total) by mouth every 4 (four) hours as needed. 15 tablet 0     OXYGEN-AIR DELIVERY SYSTEMS MISC 2 L/min into each nostril as needed (with activity). Indications: use with activity to maintain sats > 90%       potassium chloride (K-DUR,KLOR-CON) 20 MEQ tablet Take 1 tablet (20 mEq total) by mouth daily. 90 tablet 1     senna-docusate (PERICOLACE) 8.6-50 mg tablet Take 1 tablet by mouth 2 (two) times a day.  0     vitamin E 400 unit capsule Take 400 Units by mouth daily.       No current facility-administered medications on file prior to visit.      Objective  Vitals:    11/03/20 1249   BP: 134/75   Pulse: (!) 102   Resp: 12       General Appearance:  Alert, cooperative, no distress, appears stated age   Head:  Normocephalic, without obvious abnormality, atraumatic   Eyes:  PERRL, EOM's intact   Throat: Lips, mucosa, and tongue normal   Neck: Supple   Lungs:   Clear to auscultation bilaterally, respirations unlabored   Heart:  Regular rate and rhythm, S1 and S2 normal   Extremities: Extremities normal, atraumatic, no cyanosis or edema   Skin: Right and left buttocks with open ulcer, 3 x 2 cm bilaterally, the right is dry with scab forming, the left is erythematous, no active drainage or bleeding, no exposure of adipose tissue   Neurologic: Using a walker

## 2021-06-12 NOTE — TELEPHONE ENCOUNTER
Refill Approved    Rx renewed per Medication Renewal Policy. Medication was last renewed on 9/14/19.    Cherry Chaudhry, [facility] Triage/Med Refill 10/26/2020     Requested Prescriptions   Pending Prescriptions Disp Refills     buPROPion (WELLBUTRIN SR) 100 MG 12 hr tablet 180 tablet 3     Sig: Take 1 tablet (100 mg total) by mouth 2 (two) times a day.       Tricyclics/Misc Antidepressant/Antianxiety Meds Refill Protocol Passed - 10/25/2020  7:17 PM        Passed - PCP or prescribing provider visit in last year     Last office visit with prescriber/PCP: 1/23/2020 Dar Wooten MD OR same dept: Visit date not found OR same specialty: Visit date not found  Last physical: 9/14/2017 Last MTM visit: 4/9/2018 Sebas Moncada, PharmD   Next visit within 3 mo: Visit date not found  Next physical within 3 mo: Visit date not found  Prescriber OR PCP: Dar Wooten MD  Last diagnosis associated with med order: 1. Anxiety  - buPROPion (WELLBUTRIN SR) 100 MG 12 hr tablet; Take 1 tablet (100 mg total) by mouth 2 (two) times a day.  Dispense: 180 tablet; Refill: 3    If protocol passes may refill for 12 months if within 3 months of last provider visit (or a total of 15 months).

## 2021-06-12 NOTE — PROGRESS NOTES
ASSESSMENT:  1. Gastroesophageal reflux disease with esophagitis  Suspect constellation of findings including chest pain, dyspnea, fatigue, and iron deficiency anemia speak to erosive esophagitis or acid peptic disease.  Already much better with cessation of aspirin and naproxen.  Endoscopy scheduled next month.  Continue iron supplementation.  Bone density good idea considering malabsorption  - DXA Bone Density Scan; Future    2. Controlled type 2 diabetes mellitus without complication, without long-term current use of insulin  Stable without hypoglycemia.  Resumed metformin    3. Coronary atherosclerosis due to calcified coronary lesion  Stable.  Angiogram showed disease but no change.  Continue medical management    4. Dyspnea, unspecified type  Much improved with treatment of stomach and upper GI causes    5. Essential hypertension with goal blood pressure less than 140/90  Stable    6. Hypernephroma, left  New.  Reviewed CT scan from hospital.  Although she may need surgery, we will certainly need to wait till other problems are stabilized  - Ambulatory referral to Urology  - Basic Metabolic Panel    7. Iron deficiency anemia due to chronic blood loss  Continue iron  - HM2(CBC w/o Differential)    8. Elevated uric acid in blood  Recheck now with addition of allopurinol last month  - Uric Acid    Refill Vicodin.  I hope that allopurinol will decrease her attacks.  She remains off naproxen    PLAN:  Patient Instructions   We want your uric acid level to be below 6 to prevent attacks of back pain    Your diabetes is pretty good    Update blood tests    Upper and lower endoscopy September 28    See Dr Solomon Avalos of Urology for the spot on the kidney.  It might be cancer, and you might need surgery    Hold on aspirin and naproxen    Take vicodin as needed, and tylenol as needed      Orders Placed This Encounter   Procedures     DXA Bone Density Scan     Standing Status:   Future     Standing Expiration Date:    8/17/2018     Order Specific Question:   Can the procedure be changed per Radiologist protocol?     Answer:   Yes     HM2(CBC w/o Differential)     Basic Metabolic Panel     Uric Acid     Ambulatory referral to Urology     Referral Priority:   Routine     Referral Type:   Consultation     Referral Reason:   Evaluation and Treatment     Requested Specialty:   Urology     Number of Visits Requested:   1     Medications Discontinued During This Encounter   Medication Reason     LORazepam (ATIVAN) 1 MG tablet Duplicate order     HYDROcodone-acetaminophen 5-325 mg per tablet Reorder       Return in about 2 months (around 10/17/2017).    CHIEF COMPLAINT:  Chief Complaint   Patient presents with     Follow-up     INF breathing issues.       HISTORY OF PRESENT ILLNESS:  Lupe is a 77 y.o. female presenting to the clinic today for follow up after inpatient care at [facility] from 7/29/17 - 8/2/17 for dyspnea. She has a history of type 2 diabetes, renal insufficiency, CAD, COPD, hypertension, and chronic back pain. She was admitted on 7/29 with abrupt worsening shortness of breath. In the hospital, she was evaluated by pulmonology who did not think her breathing problems were due to lung issues. A CT scan of the chest showed a chronic pleural effusion but no infiltrate, mass, or other intrinsic disease. It was thought that the dyspnea was secondary to acid reflux. She was seen by GI who recommended endoscopy and colonoscopy as an outpatient. Aspirin, naproxen, and ibuprofen taken for back pain were discontinued. Anxiety was also considered as a source of her breathing problems. A CT scan of the abdomen and pelvis revealed a hypernephroma on the left and follow up with nephrology was recommended.     Since leaving the hospital, she is feeling well. Her breathing is better than it has been for a while. She has had three attacks of dyspnea, which she treated with inhaler and once with nitro. These attacks are much milder than  they had been. She does have an appointment for the upper endoscopy and colonoscopy on 9/28 but does not yet have an appointment with nephrology.     Stomach Problems: Her stomach problems are improved. She continues to have nausea but this is not as often or as severe. She has discontinued all aspirin products and is using Tylenol for pain relief. She continues on omeprazole 20mg twice daily. She did not notice a difference in stomach symptoms off metformin. She denies a history of ulcer.     Type 2 Diabetes: She discontinued metformin for 3 days after her hospitalization as recommended and then resumed. She denies any low blood sugar reactions. She has a little diarrhea.     Back Pain: She is taking Tylenol and Vicodin for back pain. She takes 2 Tylenol twice daily and one Vicodin in the morning and occasionally one at about 4:00. A trial of allopurinol has not helped with frequency or severity of back pain.     REVIEW OF SYSTEMS:   She denies headache. Her sleep is good. She continues on an iron supplement. All other systems are negative.    PFSH:    TOBACCO USE:  History   Smoking Status     Former Smoker     Quit date: 6/19/1960   Smokeless Tobacco     Never Used       VITALS:  Vitals:    08/17/17 0901   BP: 136/72   Patient Site: Left Arm   Patient Position: Sitting   Cuff Size: Adult Regular   Pulse: 96   Weight: 198 lb 1.6 oz (89.9 kg)     Wt Readings from Last 3 Encounters:   08/17/17 198 lb 1.6 oz (89.9 kg)   08/01/17 191 lb 12.8 oz (87 kg)   07/07/17 203 lb 11.2 oz (92.4 kg)     Body mass index is 35.66 kg/(m^2).    PHYSICAL EXAM:  Constitutional:  Reveals an alert, talkative, pleasant woman.  Vitals:  Per nursing notes.  Cardiac:  Regular rate and rhythm without murmurs, rubs, or gallops. Trace edema in left.   Lungs: Clear.  Respiratory effort normal.  Rheumatologic: Normal joints and nails of the hands.  Neurologic:  Cranial nerves II-XII intact.     Psychiatric:  Mood appropriate, memory intact.      QUALITY MEASURES:  The following are part of a depression follow up plan for the patient:  mental health care management    ADDITIONAL HISTORY SUMMARIZED (2): Reviewed hospital course of 7/29 - 8/2/17.  DECISION TO OBTAIN EXTRA INFORMATION (1): CHAYO for Care Everywhere obtained.   RADIOLOGY TESTS (1): Reviewed abdominal CT of 8/1/17.   LABS (1): Reviewed labs performed in the hospital. Labs ordered.   MEDICINE TESTS (1): None.  INDEPENDENT REVIEW (2 each): None.     The visit lasted a total of 16 minutes face to face with the patient. Over 50% of the time was spent counseling and educating the patient about her recent hospitalization.    ISebas, am scribing for and in the presence of, Dr. Wooten.    I, Dr. Wooten, personally performed the services described in this documentation, as scribed by Sebas Hobbs in my presence, and it is both accurate and complete.    MEDICATIONS:  Current Outpatient Prescriptions   Medication Sig Dispense Refill     ACCU-CHEK OLY PLUS TEST STRP Strp TEST THREE TIMES DAILY 300 strip 13     albuterol (PROAIR HFA) 90 mcg/actuation inhaler Inhale 2 puffs every 6 (six) hours as needed for wheezing. 1 Inhaler 11     allopurinol (ZYLOPRIM) 300 MG tablet Take 1 tablet (300 mg total) by mouth daily. 90 tablet 3     atorvastatin (LIPITOR) 10 MG tablet Take 1 tablet (10 mg total) by mouth daily. 90 tablet 3     buPROPion (WELLBUTRIN SR) 100 MG 12 hr tablet Take 1 tablet (100 mg total) by mouth Daily at 8:00 am.. 90 tablet 3     chlorhexidine (PERIDEX) 0.12 % solution Apply 15 mL to the mouth or throat 2 (two) times a day.       cyanocobalamin (VITAMIN B-12) 500 MCG tablet Take 500 mcg by mouth daily.       diltiazem (DILACOR XR) 240 MG 24 hr capsule Take 1 capsule (240 mg total) by mouth daily. 90 capsule 3     docusate sodium (COLACE) 50 MG capsule Take 100 mg by mouth daily as needed for constipation.       ferrous gluconate (FERGON) 324 MG tablet Take 1 tablet (324 mg total) by  mouth 2 (two) times a day with meals. 100 tablet 2     FLUoxetine (PROZAC) 20 MG capsule Take 2 capsules (40 mg total) by mouth daily. 180 capsule 3     fluticasone-salmeterol (ADVAIR) 500-50 mcg/dose DISKUS Inhale 1 puff 2 (two) times a day.       furosemide (LASIX) 40 MG tablet Take 40 mg by mouth daily.       HYDROcodone-acetaminophen 5-325 mg per tablet Take 1 tablet by mouth every 6 (six) hours as needed for pain. 60 tablet 0     ipratropium-albuterol (DUO-NEB) 0.5-2.5 mg/3 mL nebulizer 3 mL every 6 (six) hours as needed.       isosorbide mononitrate (IMDUR) 30 MG 24 hr tablet Take 30 mg by mouth daily.       LORazepam (ATIVAN) 1 MG tablet Take 1 mg by mouth 2 (two) times a day as needed for anxiety.       metFORMIN (GLUCOPHAGE) 500 MG tablet Take 500 mg by mouth 3 (three) times a day.       OMEGA-3/DHA/EPA/FISH OIL (FISH OIL-OMEGA-3 FATTY ACIDS) 300-1,000 mg capsule Take 1 g by mouth daily.        omeprazole (PRILOSEC) 20 MG capsule TAKE 1 CAPSULE(20 MG) BY MOUTH TWICE DAILY 180 capsule 3     pioglitazone (ACTOS) 30 MG tablet Take 30 mg by mouth daily.       potassium chloride SA (K-DUR,KLOR-CON) 20 MEQ tablet Take 1 tablet (20 mEq total) by mouth daily. 90 tablet 1     sitaGLIPtin (JANUVIA) 100 MG tablet Take 1 tablet (100 mg total) by mouth daily. 90 tablet 3     aspirin 81 MG EC tablet Take 1 tablet (81 mg total) by mouth daily.  0     No current facility-administered medications for this visit.        Total data points: 5

## 2021-06-13 NOTE — PROGRESS NOTES
ASSESSMENT:  1. Type 2 diabetes mellitus with stage 3b chronic kidney disease, without long-term current use of insulin (H)  No hypoglycemia.  No insulin.  Update labs  - Microalbumin, Random Urine  - Glycosylated Hemoglobin A1c  - Lipid Cascade    2. Essential hypertension with goal blood pressure less than 140/90  Stable  - Comprehensive Metabolic Panel    3. Anemia due to blood loss, acute  Recheck after hemoglobin 7.5 after surgery  - HM2(CBC w/o Differential)    4. Stage 3b chronic kidney disease  Stable.  Recheck    5. Renal cell cancer, left (H)  Last CT scan 2018.  Update CT scan without urology appointment  - CT Abdomen Pelvis With Oral Without IV Contrast; Future    6. Closed fracture of right hip with routine healing  Reviewed March fall and fracture with TCU stay.  No pain    7. Chronic combined systolic and diastolic congestive heart failure (H)  Stable volume    8. Severe obesity (BMI 35.0-35.9 with comorbidity) (H)  Weight stable    9. Elevated uric acid in blood  No gout or joint pain  - Uric Acid    10. Atherosclerosis of native coronary artery of native heart without angina pectoris  No chest pain or shortness of breath  - nitroglycerin (NITROSTAT) 0.4 MG SL tablet; Place 1 tablet (0.4 mg total) under the tongue every 5 (five) minutes as needed for chest pain.  Dispense: 20 tablet; Refill: 3    11. Vitamin D deficiency  - Vitamin D, Total (25-Hydroxy)      Preventive Health Care: Flu shot up-to-date    PLAN:  Patient Instructions   Update blood tests    Refill nitroglycerine    Update CT scan           Orders Placed This Encounter   Procedures     CT Abdomen Pelvis With Oral Without IV Contrast     Standing Status:   Future     Standing Expiration Date:   11/20/2021     Order Specific Question:   Reason for Exam (Describe Symptoms):     Answer:   follow up renal cell cancer     Order Specific Question:   Can the procedure be changed per Radiologist protocol?     Answer:   Yes     Order Specific  "Question:   If this is a diagnostic procedure, have the patient's age and recent imaging history been considered?     Answer:   Yes     Microalbumin, Random Urine     Glycosylated Hemoglobin A1c     Comprehensive Metabolic Panel     HM2(CBC w/o Differential)     Lipid Cascade     Order Specific Question:   Fasting is required?     Answer:   Unknown     Vitamin D, Total (25-Hydroxy)     Uric Acid       Return in about 4 months (around 3/20/2021) for a phone/video follow up visit.    CHIEF COMPLAINT:  Chief Complaint   Patient presents with     Follow-up       HISTORY OF PRESENT ILLNESS:  Lupe is a 80 y.o. female presenting to the clinic today accompanied by her daughter for general review    She fell in March and fractured her right hip.  This was treated with a nail.  She was sent to a TCU and returned home in April    She had a sinus infection in April which was treated with doxycycline.  This has resolved    She is worried about falling.  She uses a walker sometimes.    Mood is stable.  Daughter does not wish any of her psychiatric medications to be adjusted    She has a history of renal cell carcinoma.  Last CT scan to monitor this was done 2 years ago and was stable    REVIEW OF SYSTEMS:   No chest pain or shortness of breath.  No gout.  No hip pain.  Stable mood all other systems are negative.    PFSH:  Social History     Social History Narrative     58+ years; 2 grown children      Nino with advanced prostate cancer and living at home with her    TOBACCO USE:  Social History     Tobacco Use   Smoking Status Former Smoker     Quit date: 1960     Years since quittin.4   Smokeless Tobacco Never Used       VITALS:  Vitals:    20 1146   BP: 128/78   Patient Site: Left Arm   Patient Position: Sitting   Cuff Size: Adult Regular   Pulse: 70   SpO2: 96%   Weight: 171 lb (77.6 kg)   Height: 5' 2.5\" (1.588 m)     Wt Readings from Last 3 Encounters:   20 171 lb (77.6 kg)   20 " 169 lb (76.7 kg)   03/20/20 195 lb (88.5 kg)     Body mass index is 30.78 kg/m .    PHYSICAL EXAM:  Constitutional:  Reveals pleasant nervous quiet woman accompanied by daughter  Vitals:  Per nursing notes.    Cardiac: Regular rate and rhythm without murmurs, rubs, or gallops.   Palpation of radial pulse regular  Legs very trace edema  Lungs: Clear.  Respiratory effort normal.  No wheezes  Psychiatric: Calm and flat      ADDITIONAL HISTORY SUMMARIZED (2): Reviewed office note for decubitus ulcer and hospital discharge summary  DECISION TO OBTAIN EXTRA INFORMATION (1):   RADIOLOGY TESTS (1): CT abdomen ordered  LABS (1): Ordered  Cardiology/MEDICINE TESTS (1):   INDEPENDENT REVIEW (2 each): None.     Total data points: 4    Time In: 11:35 AM  Time Out: 1:03 PM    The visit lasted a total of 28 minutes face to face with the patient. Over 50% of the time was spent counseling and educating the patient about medications, medication adjustments, medication side effects, and lab testing.        MEDICATIONS:  Current Outpatient Medications   Medication Sig Dispense Refill     acetaminophen (TYLENOL) 500 MG tablet Take 2 tablets (1,000 mg total) by mouth 3 (three) times a day.  0     albuterol (PROAIR HFA) 90 mcg/actuation inhaler Inhale 2 puffs every 6 (six) hours as needed for wheezing. 1 Inhaler 3     allopurinol (ZYLOPRIM) 300 MG tablet TAKE 1 TABLET(300 MG) BY MOUTH DAILY 90 tablet 3     ARIPiprazole (ABILIFY) 2 MG tablet TAKE 1 TABLET BY MOUTH ONCE DAILY 90 tablet 3     aspirin 81 mg chewable tablet Chew 1 tablet (81 mg total) 2 (two) times a day.  0     atorvastatin (LIPITOR) 10 MG tablet TAKE 1 TABLET(10 MG) BY MOUTH DAILY 90 tablet 2     blood glucose test (ACCU-CHEK OLY PLUS TEST STRP) strips TEST THREE TIMES DAILY 300 strip 3     buPROPion (WELLBUTRIN SR) 100 MG 12 hr tablet Take 1 tablet (100 mg total) by mouth 2 (two) times a day. 180 tablet 1     calcium citrate (CALCITRATE) 200 mg (950 mg) tablet Take 1  tablet by mouth 2 (two) times a day.       cholecalciferol, vitamin D3, 1,000 unit (25 mcg) tablet Take 1,000 Units by mouth daily.       FLUoxetine (PROZAC) 20 MG capsule TAKE 1 CAPSULE(20 MG) BY MOUTH TWICE DAILY 180 capsule 2     fluticasone propion-salmeteroL (ADVAIR DISKUS) 500-50 mcg/dose DISKUS Inhale 1 puff 2 (two) times a day. 3 each 3     furosemide (LASIX) 40 MG tablet Take 1 tablet (40 mg total) by mouth daily as needed (leg swelling). 90 tablet 3     glipiZIDE (GLUCOTROL) 5 MG tablet Take 1 tablet (5 mg total) by mouth daily. Before breakfast 90 tablet 3     LORazepam (ATIVAN) 1 MG tablet Take 1 tablet (1 mg total) by mouth daily before breakfast. 30 tablet 0     metoprolol succinate (TOPROL-XL) 50 MG 24 hr tablet TAKE 1 TABLET(50 MG) BY MOUTH AT BEDTIME 90 tablet 3     nitroglycerin (NITROSTAT) 0.4 MG SL tablet Place 1 tablet (0.4 mg total) under the tongue every 5 (five) minutes as needed for chest pain. 20 tablet 3     OXYGEN-AIR DELIVERY SYSTEMS MISC 2 L/min into each nostril as needed (with activity). Indications: use with activity to maintain sats > 90%       potassium chloride (K-DUR,KLOR-CON) 20 MEQ tablet Take 1 tablet (20 mEq total) by mouth daily. 90 tablet 1     silver sulfADIAZINE (SILVADENE, SSD) 1 % cream Apply to affected area twice daily. 50 g 1     vitamin E 400 unit capsule Take 400 Units by mouth daily.       No current facility-administered medications for this visit.

## 2021-06-13 NOTE — PROCEDURES
POST PROCEDURE NOTE left renal mass    Post procedure Diagnosis: renal mass    Technical Procedure: CT guided left upper pole renal mass biopsy      Physician: Valentino Felix    EBL:  Minimal    Specimens Removed:  2 cores, 18g     Complications:  None.

## 2021-06-13 NOTE — PROGRESS NOTES
ASSESSMENT:  1. Controlled type 2 diabetes mellitus without complication, without long-term current use of insulin  Stable without insulin.  Confirm and refill medication  - Glycosylated Hemoglobin A1c    2. Renal cell cancer, left  Reviewed biopsy.  Agree with plan for watchful waiting  - Basic Metabolic Panel  - HM2(CBC w/o Differential)    3.  Erosive esophagitis.  Endoscopy last month showed no ulcer after 2 months of high-dose omeprazole.  Nonetheless, despite proof, all of her other symptoms have improved.  Recommend staying on aspirin and off naproxen.    4.  Hypertension.  With cardiac issue much less likely, trial now off isosorbide    5.  Anxiety.  Unclear whether she truly has panic attacks.  Trial of low-dose nortriptyline.  Try to decrease use of lorazepam    PLAN:  Patient Instructions   Stay on Omeprazole twice a day, probably forever    Add Nortriptyline 25 mgs at bedtime to help sleep, and panic, and pain    Stop Isosorbide.  Go back on it if you get any chest pain, or worsening trouble breathing    Increase lorazepam and hydrocodone to 60 pills apiece, but try to use them less          Orders Placed This Encounter   Procedures     Glycosylated Hemoglobin A1c     Basic Metabolic Panel     HM2(CBC w/o Differential)     Medications Discontinued During This Encounter   Medication Reason     JANUVIA 100 mg tablet Duplicate order     LORazepam (ATIVAN) 1 MG tablet Duplicate order     LORazepam (ATIVAN) 1 MG tablet Reorder     HYDROcodone-acetaminophen 5-325 mg per tablet Reorder     sitaGLIPtin (JANUVIA) 100 MG tablet Reorder     isosorbide mononitrate (IMDUR) 30 MG 24 hr tablet        Return in about 3 months (around 1/24/2018).    CHIEF COMPLAINT:  Chief Complaint   Patient presents with     Follow-up     test results and gastro     Sinusitis     Since 10/22       HISTORY OF PRESENT ILLNESS:  Lupe is a 77 y.o. female presenting to the clinic today to follow up on her recent biopsy results and with  complaints of sinus pressure. Overall, she states she feels better than she has in a long time.    Renal Cell Carcinoma: She has a cancerous tumor on her left kidney, but she was told that it is not growing. The biopsy showed grade 1 clear cell renal carcinoma. She does not know how long she has had it, but she is going to have an MRI done later this year to see if it changes at all. If it does not change, she was told they will simply watch it for a year. She was told that she is not a candidate for surgery.     Anxiety: She continues to have intermittent panic attacks. She thinks she is more likely to have one if she overworks herself or if she is nervous. She has been taking more lorazepam lately, but she is careful about it. She tends to take one in the evening, which helps her sleep. She also takes bupropion and fluoxetine. She starts to panic when she gets low on lorazepam and would like a larger prescription. There are some days that she does not need any lorazepam, but there are others where she needs two. She generally tries to work things out herself.     Wound: She has some sores on her abdomen that bother her. They are red and blue in color. She has been washing the areas well and putting antibiotic cream on them, but otherwise she tries not to touch them. The skin is peeling. The lesion is open right now, but that is not the usual.     Asthma: Her breathing has been fine other than very occasional shortness of breath. She uses her inhalers. The episodes of shortness of breath tend to be related to her anxiety.     Diabetes: Her last hemoglobin A1c was 7.3% on 7/10/2017. She will have an A1c checked again today.     Gout: She has not had any problems with gout lately. She continues to take allopurinol regularly.     Back Pain: She only takes her Vicodin if her back is really bothering her. The amount she takes depends on the week.     GERD: Her stomach has not been bothering her lately. She continues to  "take omeprazole daily. She had an EGD in September.     REVIEW OF SYSTEMS:   Her energy levels have been good. She has not had any chest pain. Her feet and legs do not hurt. She can walk without a problem and is not limited. She has occasionally problems with diarrhea, but she has had constipation as well. She has not been nauseous since she was in the hospital this summer. She thinks she has had a sinus infection since Sunday. She is taking a daily aspirin. All other systems are negative.    PFSH:  Reviewed, as below.     TOBACCO USE:  History   Smoking Status     Former Smoker     Quit date: 6/19/1960   Smokeless Tobacco     Never Used       VITALS:  Vitals:    10/24/17 1407   BP: 124/74   Patient Site: Left Arm   Patient Position: Sitting   Cuff Size: Adult Large   Pulse: 84   Resp: 20   Temp: 99.1  F (37.3  C)   TempSrc: Tympanic   SpO2: 95%   Weight: 200 lb 1 oz (90.7 kg)   Height: 5' 2.25\" (1.581 m)     Wt Readings from Last 3 Encounters:   10/24/17 200 lb 1 oz (90.7 kg)   09/19/17 202 lb (91.6 kg)   09/14/17 202 lb 3.2 oz (91.7 kg)     Body mass index is 36.3 kg/(m^2).    PHYSICAL EXAM:  Constitutional:  Reveals an alert, pleasant, talkative woman. She is fidgety and appears somewhat anxious. Vitals:  Per nursing notes.  Cardiac:  Regular rate and rhythm without murmurs, rubs, or gallops. Legs without edema. Palpation of the radial pulse regular.  Lungs: Clear.  Respiratory effort normal.  Skin:   2-3 cm wound on abdomen opened up on old scar.   Neurologic:  Cranial nerves II-XII intact.     Psychiatric:  Mood appropriate, memory intact.     ADDITIONAL HISTORY SUMMARIZED (2): Reviewed 10/3/2017 [facility] note regarding renal cell carcinoma. Reviewed July 2017 hospital note regarding shortness of breath. Reviewed 9/29 colonoscopy and EGD regarding polyp and GERD.   DECISION TO OBTAIN EXTRA INFORMATION (1): None.   RADIOLOGY TESTS (1): None.  LABS (1): Reviewed 9/19 pathology report. Labs ordered.   MEDICINE " TESTS (1): None.  INDEPENDENT REVIEW (2 each): None.     The visit lasted a total of 20 minutes face to face with the patient. Over 50% of the time was spent counseling and educating the patient about her medications and renal cell carcinoma.    I, Teodoro Ibarra, am scribing for and in the presence of, Dr. Wooten.    I, Dr. Wooten, personally performed the services described in this documentation, as scribed by Teodoro Ibarra in my presence, and it is both accurate and complete.    MEDICATIONS:  Current Outpatient Prescriptions   Medication Sig Dispense Refill     ACCU-CHEK OLY PLUS TEST STRP Strp TEST THREE TIMES DAILY 300 strip 13     albuterol (PROAIR HFA) 90 mcg/actuation inhaler Inhale 2 puffs every 6 (six) hours as needed for wheezing. 1 Inhaler 3     allopurinol (ZYLOPRIM) 300 MG tablet Take 1 tablet (300 mg total) by mouth daily. 90 tablet 3     aspirin 81 MG EC tablet Take 1 tablet (81 mg total) by mouth daily.  0     atorvastatin (LIPITOR) 10 MG tablet Take 1 tablet (10 mg total) by mouth daily. 90 tablet 3     buPROPion (WELLBUTRIN SR) 100 MG 12 hr tablet Take 1 tablet (100 mg total) by mouth Daily at 8:00 am.. 90 tablet 3     cyanocobalamin (VITAMIN B-12) 500 MCG tablet Take 500 mcg by mouth daily.       diltiazem (DILACOR XR) 240 MG 24 hr capsule Take 1 capsule (240 mg total) by mouth daily. 90 capsule 3     docusate sodium (COLACE) 50 MG capsule Take 100 mg by mouth daily as needed for constipation.       FLUoxetine (PROZAC) 20 MG capsule Take 2 capsules (40 mg total) by mouth daily. 180 capsule 3     fluticasone-salmeterol (ADVAIR) 500-50 mcg/dose DISKUS Inhale 1 puff 2 (two) times a day.       furosemide (LASIX) 40 MG tablet Take 40 mg by mouth daily.       HYDROcodone-acetaminophen 5-325 mg per tablet Take 1 tablet by mouth every 6 (six) hours as needed for pain. 60 tablet 0     ipratropium-albuterol (DUO-NEB) 0.5-2.5 mg/3 mL nebulizer 3 mL every 6 (six) hours as needed.       metFORMIN  (GLUCOPHAGE) 500 MG tablet Take 500 mg by mouth 3 (three) times a day.       OMEGA-3/DHA/EPA/FISH OIL (FISH OIL-OMEGA-3 FATTY ACIDS) 300-1,000 mg capsule Take 1 g by mouth daily.        omeprazole (PRILOSEC) 20 MG capsule TAKE 1 CAPSULE(20 MG) BY MOUTH TWICE DAILY 180 capsule 3     pioglitazone (ACTOS) 30 MG tablet Take 30 mg by mouth daily.       potassium chloride SA (K-DUR,KLOR-CON) 20 MEQ tablet Take 1 tablet (20 mEq total) by mouth daily. 90 tablet 1     sitaGLIPtin (JANUVIA) 100 MG tablet Take 1 tablet (100 mg total) by mouth daily. 90 tablet 3     LORazepam (ATIVAN) 1 MG tablet TAKE 1 TABLET(1 MG) BY MOUTH TWICE DAILY AS NEEDED FOR ANXIETY 60 tablet 0     nortriptyline (PAMELOR) 25 MG capsule Take 1 capsule (25 mg total) by mouth at bedtime. 90 capsule 3     No current facility-administered medications for this visit.        Total data points: 3

## 2021-06-13 NOTE — PROGRESS NOTES
ASSESSMENT:  1. Pre-operative examination for internal medicine  Stable for surgery.  His tolerated previous surgeries well without bleeding or anesthesia difficulties.  Recent angiogram and cardiac testing shows stability of disease.  No undue bleeding or anesthesia difficulties    2. Hypernephroma, left  Reviewed CT scan.  Urology note not yet available.  Presumably intermediate risk of nephroma with high risk of surgery necessitates clarification of diagnosis.  - HM2(CBC w/o Differential)  - Basic Metabolic Panel    3. Health care maintenance  - Influenza High Dose, Seasonal 65+ yrs    4. Controlled type 2 diabetes mellitus without complication, without long-term current use of insulin  Stable.  Hold metformin perioperatively    5. Coronary atherosclerosis due to calcified coronary lesion  Stable.  Reviewed testing.  EKG from July reviewed    6. Gastroesophageal reflux disease with esophagitis  Much better with cessation of naproxen.  She may have resumed aspirin but is now told again to stop this.  Endoscopy scheduled later this month.  Continue twice daily omeprazole.  Her chest pain, breathing, and abdominal pain and panic attacks of all resolved presumably from treating GI source      7.  Back pain.  Stay off naproxen.  Continue Vicodin once to twice daily    PLAN:  Patient Instructions   Stop baby aspirin, and stay off that until your endoscopy    No Naproxen for pain.  Stay off it     Upper GI endoscopy Sept 29    Stop:  Metformin after Saturday, sept 17, and resume it Friday, sept 22    Morning of procedure take:  Diltiazem  advair and all other inhalers  Omeprazole  Hydrocodone  Lorazepam  Hold the other pills that morning.  Take your evening pills that evening , then resume normal the next day          Orders Placed This Encounter   Procedures     Influenza High Dose, Seasonal 65+ yrs     HM2(CBC w/o Differential)     Basic Metabolic Panel     There are no discontinued medications.    Return in about  4 weeks (around 10/12/2017).    CHIEF COMPLAINT:  Chief Complaint   Patient presents with     Pre-op Exam       HISTORY OF PRESENT ILLNESS:  Lupe is a 77 y.o. female here for an internal medicine pre-operative consultation. The exam is requested by Dr. David  in preparation for CT guided biopsy of the left kidney to be performed at [facility] on 9/19/17. Today s examination on 9/15/2017 is done to review the underlying surgical condition of left renal mass, clear for anesthesia, and review medical problems with appropriate changes in medications. The renal mass was discovered on CT scan during her hospitalization earlier this month for dyspnea.     Lupe has tolerated previous surgeries well without bleeding or anesthesia difficulty.      REVIEW OF SYSTEMS:   She was hospitalized in early August for dyspnea, which was ultimately felt to be due to a gastrointestinal source and anxiety. She was found to be anemic with hemoglobin of 10-11. Aspirin and naproxen were discontinued. She is scheduled for an upper endoscopy on September 29. She is taking iron. She reports that she has not had any shortness of breath or anxiety since leaving the hospital. She denies stomach upset. Her nausea is resolved. She continues on omeprazole.   Back Pain: She continues on one Vicodin daily for back pain with an occasional second dose at 6:00 in the evening.    Type 2 Diabetes: She continues on metformin 500mg three times daily, pioglitazone 30mg daily, and Januvia 100mg daily. She denies diarrhea.   She denies chest pains. Her breathing is good other than some difficulty with the humidity. She denies current upper respiratory symptoms.   All other systems are negative.    PFSH:    History   Smoking Status     Former Smoker     Quit date: 6/19/1960   Smokeless Tobacco     Never Used       Family History   Problem Relation Age of Onset     Heart disease Mother      Diabetes Father      Cancer Sister      Heart attack Maternal Uncle       Heart attack Maternal Grandfather        Past Surgical History:   Procedure Laterality Date     CARDIAC CATHETERIZATION N/A 7/31/2017    Procedure: Coronary Angiogram;  Surgeon: Anthony Diaz MD;  Location: [facility];  Service:      CHOLECYSTECTOMY  07/13/2006     heart bypass  2005     HYSTERECTOMY  1977     VA CABG, VEIN, SINGLE      Description: CABG (CABG);  Recorded: 11/02/2008;     VA CATH PLMT L HRT & ARTS W/NJX & ANGIO IMG S&I Left 7/31/2017    Procedure: Left Heart Catheterization Without Left Ventriculogram;  Surgeon: Anthony Diaz MD;  Location: [facility];  Service: Cardiology     VA LAP,CHOLECYSTECTOMY      Description: Cholecystectomy Laparoscopic;  Recorded: 01/07/2008;     VA TOTAL ABDOM HYSTERECTOMY      Description: Hysterectomy;  Recorded: 11/02/2008;       Allergies   Allergen Reactions     Insulin Aspart      Subcutaneous insulin inj in 10/2013 led to abdominal wall cellulitis requiring debridement     Monosodium Glutamate        Active Ambulatory Problems     Diagnosis Date Noted     Nephrolithiasis      Esophageal reflux      Lower Back Pain      Iron Deficiency      Allergic Rhinitis      Coronary Artery Disease      Heller's Neuroma Of The Left Foot      Chronic Obstructive Pulmonary Disease      Essential hypertension with goal blood pressure less than 140/90      Iron deficiency anemia due to chronic blood loss      Anxiety      Asthma      Benign Tubular Adenoma Of The Large Intestine      NSTEMI (non-ST elevated myocardial infarction) 02/01/2016     Anemia due to chronic illness 02/02/2016     Leukocytosis 02/02/2016     Right upper lobe pneumonia 02/03/2016     Sleep apnea, obstructive 02/03/2016     Lymphedema of left lower extremity 02/12/2016     Pure hypercholesterolemia 01/26/2017     Controlled type 2 diabetes mellitus without complication, without long-term current use of insulin 01/26/2017     Hx of CABG 07/29/2017     Systolic murmur  "of aorta      Dyspnea, unspecified type      Aortic stenosis, mild 07/30/2017     CHF (congestive heart failure) 07/30/2017     Azotemia 07/30/2017     ACS (acute coronary syndrome)      Hypokalemia 08/01/2017     Hypernephroma, left 08/02/2017     Renal mass, left 08/17/2017     Elevated uric acid in blood 08/17/2017     Resolved Ambulatory Problems     Diagnosis Date Noted     Diabetes Mellitus With Complication      Hyperlipidemia      Cellulitis      Type 2 diabetes mellitus treated without insulin      Acute bronchitis      COPD exacerbation 01/30/2016     Acute respiratory failure with hypoxemia 01/30/2016     Acute chest pain 01/31/2016     Asthma exacerbation 07/29/2017     Acute bronchitis, unspecified organism      Chest wall pain 07/30/2017     Chest pain, unspecified type      Past Medical History:   Diagnosis Date     Arthritis      Asthma      COPD (chronic obstructive pulmonary disease)      Coronary artery disease      Depression      Diabetes mellitus      GERD (gastroesophageal reflux disease)      High cholesterol      Kidney stones      Lymphedema of left lower extremity      Sleep apnea        VITALS:  Vitals:    09/14/17 1107   BP: 120/62   Patient Site: Left Arm   Patient Position: Sitting   Cuff Size: Adult Large   Pulse: 86   Resp: 18   Temp: 98.2  F (36.8  C)   TempSrc: Oral   SpO2: 94%   Weight: 202 lb 3.2 oz (91.7 kg)   Height: 5' 2.25\" (1.581 m)     Wt Readings from Last 3 Encounters:   09/14/17 202 lb 3.2 oz (91.7 kg)   08/17/17 198 lb 1.6 oz (89.9 kg)   08/01/17 191 lb 12.8 oz (87 kg)     Body mass index is 36.69 kg/(m^2).    PHYSICAL EXAM:  Constitutional:  Reveals an alert, talkative woman.  Vitals:  Per nursing notes.  Ears:  Clear.  Oropharynx:  Without posterior nasal drainage or thrush.  Neck:  Supple, thyroid not palpable.  Cardiac:  Trace to 1+ edema bilaterally.   Abdomen:   Bowel sounds positive, nontender, nondistended.  Neither liver nor spleen palpable.  Skin:   Without " rash, bruise, or palpable lesions.  Rheumatologic: Normal joints and nails of the hands.  Neurologic:  Cranial nerves II-XII intact.     Psychiatric:  Mood appropriate, memory intact.  EKG of 7/30/17:  Normal sinus rhythm.     ADDITIONAL HISTORY SUMMARIZED (2): None.  DECISION TO OBTAIN EXTRA INFORMATION (1): CHAYO for Care Everywhere obtained.   RADIOLOGY TESTS (1): None.  LABS (1): Labs ordered.  MEDICINE TESTS (1): None.   INDEPENDENT REVIEW (2 each): EKG of 7/30/17 personally interpreted.    The visit lasted a total of 22 minutes face to face with the patient. Over 50% of the time was spent counseling and educating the patient about her medications.    Sebas FUENTES, am scribing for and in the presence of, Dr. Wooten.    IDr. Wooten, personally performed the services described in this documentation, as scribed by Sebas Hobbs in my presence, and it is both accurate and complete.    MEDICATIONS:  Current Outpatient Prescriptions   Medication Sig Dispense Refill     ACCU-CHEK OLY PLUS TEST STRP Strp TEST THREE TIMES DAILY 300 strip 13     albuterol (PROAIR HFA) 90 mcg/actuation inhaler Inhale 2 puffs every 6 (six) hours as needed for wheezing. 1 Inhaler 11     allopurinol (ZYLOPRIM) 300 MG tablet Take 1 tablet (300 mg total) by mouth daily. 90 tablet 3     aspirin 81 MG EC tablet Take 1 tablet (81 mg total) by mouth daily.  0     atorvastatin (LIPITOR) 10 MG tablet Take 1 tablet (10 mg total) by mouth daily. 90 tablet 3     buPROPion (WELLBUTRIN SR) 100 MG 12 hr tablet Take 1 tablet (100 mg total) by mouth Daily at 8:00 am.. 90 tablet 3     cyanocobalamin (VITAMIN B-12) 500 MCG tablet Take 500 mcg by mouth daily.       diltiazem (DILACOR XR) 240 MG 24 hr capsule Take 1 capsule (240 mg total) by mouth daily. 90 capsule 3     docusate sodium (COLACE) 50 MG capsule Take 100 mg by mouth daily as needed for constipation.       ferrous gluconate (FERGON) 324 MG tablet Take 1 tablet (324 mg total) by mouth 2  (two) times a day with meals. 100 tablet 2     FLUoxetine (PROZAC) 20 MG capsule Take 2 capsules (40 mg total) by mouth daily. 180 capsule 3     fluticasone-salmeterol (ADVAIR) 500-50 mcg/dose DISKUS Inhale 1 puff 2 (two) times a day.       furosemide (LASIX) 40 MG tablet Take 40 mg by mouth daily.       HYDROcodone-acetaminophen 5-325 mg per tablet Take 1 tablet by mouth every 6 (six) hours as needed for pain. 60 tablet 0     ipratropium-albuterol (DUO-NEB) 0.5-2.5 mg/3 mL nebulizer 3 mL every 6 (six) hours as needed.       isosorbide mononitrate (IMDUR) 30 MG 24 hr tablet Take 30 mg by mouth daily.       LORazepam (ATIVAN) 1 MG tablet Take 1 tablet (1 mg total) by mouth 2 (two) times a day as needed for anxiety. 30 tablet 1     metFORMIN (GLUCOPHAGE) 500 MG tablet Take 500 mg by mouth 3 (three) times a day.       OMEGA-3/DHA/EPA/FISH OIL (FISH OIL-OMEGA-3 FATTY ACIDS) 300-1,000 mg capsule Take 1 g by mouth daily.        omeprazole (PRILOSEC) 20 MG capsule TAKE 1 CAPSULE(20 MG) BY MOUTH TWICE DAILY 180 capsule 3     pioglitazone (ACTOS) 30 MG tablet Take 30 mg by mouth daily.       potassium chloride SA (K-DUR,KLOR-CON) 20 MEQ tablet Take 1 tablet (20 mEq total) by mouth daily. 90 tablet 1     sitaGLIPtin (JANUVIA) 100 MG tablet Take 1 tablet (100 mg total) by mouth daily. 90 tablet 3     No current facility-administered medications for this visit.        Total data points: 4

## 2021-06-14 NOTE — TELEPHONE ENCOUNTER
Reason for Call:  Medication or medication refill:    Do you use a [facility]?  Name of the pharmacy and phone number for the current request: [facility] on [address] [phone]    Name of the medication requested: LORazepam (ATIVAN) 1 MG tablet for panic attacks.  Patient misplaced her medication.  She is requesting asap as she feels she might be going into a full blown attack.  But for now she is handling it.     Other request: no    Can we leave a detailed message on this number? Yes    Phone number patient can be reached at: Home number on file [phone] (home)    Best Time: anytime    Call taken on 2/2/2021 at 4:07 PM by Gissell Mayers

## 2021-06-14 NOTE — TELEPHONE ENCOUNTER
Refill Approved    Rx renewed per Medication Renewal Policy. Medication was last renewed on 1/20/20.    Jackie Yang, [facility] Triage/Med Refill 1/8/2021     Requested Prescriptions   Pending Prescriptions Disp Refills     potassium chloride (K-DUR,KLOR-CON) 20 MEQ tablet [Pharmacy Med Name: POTASSIUM CL 20MEQ ER TABLETS] 90 tablet 1     Sig: TAKE 1 TABLET(20 MEQ) BY MOUTH DAILY       Potassium Supplements Refill Protocol Passed - 1/8/2021  4:43 PM        Passed - PCP or prescribing provider visit in past 12 months       Last office visit with prescriber/PCP: 11/20/2020 Dar Wooten MD OR same dept: 1/23/2020 Dar Wooten MD OR same specialty: 11/20/2020 Dar Wooten MD  Last physical: 9/14/2017 Last MTM visit: Visit date not found   Next visit within 3 mo: Visit date not found  Next physical within 3 mo: Visit date not found  Prescriber OR PCP: Dar Wooten MD  Last diagnosis associated with med order: 1. Essential hypertension  - potassium chloride (K-DUR,KLOR-CON) 20 MEQ tablet [Pharmacy Med Name: POTASSIUM CL 20MEQ ER TABLETS]; TAKE 1 TABLET(20 MEQ) BY MOUTH DAILY  Dispense: 90 tablet; Refill: 1    2. Hypokalemia due to loss of potassium  - potassium chloride (K-DUR,KLOR-CON) 20 MEQ tablet [Pharmacy Med Name: POTASSIUM CL 20MEQ ER TABLETS]; TAKE 1 TABLET(20 MEQ) BY MOUTH DAILY  Dispense: 90 tablet; Refill: 1    If protocol passes may refill for 12 months if within 3 months of last provider visit (or a total of 15 months).             Passed - Potassium level in last 12 months     Lab Results   Component Value Date    Potassium 4.6 11/20/2020

## 2021-06-14 NOTE — PROGRESS NOTES
[facility] Note    Lupe Hill   77 y.o. female    Date of Visit: 11/28/2017  Chief Complaint   Patient presents with     Suture / Staple Removal     locate on the left side       ASSESSMENT/PLAN  1. Laceration of forehead     2. Fall, subsequent encounter       ---------------------------------------------    Uncomplicated suture removal, provided care instructions for her.  I offered PT for balance and gait training, she will think about this.  Follow-up with Dar Wooten MD  as ordinarily scheduled.  I could not see anything obvious on her medication regimen which could be leading to recurrent falls.    Return for Next scheduled follow up.      SUBJECTIVE  Lupe Hill is a 77-year-old woman who presents for follow-up after a fall on November 21.  She went to the emergency room and had laceration repair, simple interrupted sutures were used to secure laceration above the left orbit.  She has not noticed any difficulty with the incision such as signs of infection.  She has been putting peroxide on this area.  She has not been washing her hair or putting makeup on this area.  She is here to get them removed.  It was recommended that she have these removed about 5 days after they were secured in place, so it is time to have them removed.    She describes having her falls without much warning.  She had an angiogram earlier this year, echocardiogram as well, the latter not showing any severe aortic stenosis.    Medications, allergies, and problem list were reviewed and updated    Patient Active Problem List   Diagnosis     Nephrolithiasis     Esophageal reflux     Lower Back Pain     Iron Deficiency     Allergic Rhinitis     Coronary Artery Disease     Heller's Neuroma Of The Left Foot     Chronic Obstructive Pulmonary Disease     Essential hypertension with goal blood pressure less than 140/90     Iron deficiency anemia due to chronic blood loss     Anxiety     Asthma     Benign Tubular  Adenoma Of The Large Intestine     NSTEMI (non-ST elevated myocardial infarction)     Anemia due to chronic illness     Leukocytosis     Right upper lobe pneumonia     Sleep apnea, obstructive     Lymphedema of left lower extremity     Pure hypercholesterolemia     Controlled type 2 diabetes mellitus without complication, without long-term current use of insulin     Hx of CABG     Systolic murmur of aorta     Dyspnea, unspecified type     Aortic stenosis, mild     CHF (congestive heart failure)     Azotemia     ACS (acute coronary syndrome)     Hypokalemia     Hypernephroma, left     Renal mass, left     Elevated uric acid in blood     Renal cell cancer, left     Past Medical History:   Diagnosis Date     Arthritis      Asthma      COPD (chronic obstructive pulmonary disease)      Coronary artery disease      Depression      Diabetes mellitus      GERD (gastroesophageal reflux disease)      High cholesterol      Kidney stones      Lymphedema of left lower extremity      Sleep apnea      Current Outpatient Prescriptions   Medication Sig Dispense Refill     ACCU-CHEK OLY PLUS TEST STRP Strp TEST THREE TIMES DAILY 300 strip 13     albuterol (PROAIR HFA) 90 mcg/actuation inhaler Inhale 2 puffs every 6 (six) hours as needed for wheezing. 1 Inhaler 3     allopurinol (ZYLOPRIM) 300 MG tablet Take 1 tablet (300 mg total) by mouth daily. 90 tablet 3     aspirin 81 MG EC tablet Take 1 tablet (81 mg total) by mouth daily.  0     atorvastatin (LIPITOR) 10 MG tablet Take 1 tablet (10 mg total) by mouth daily. 90 tablet 3     buPROPion (WELLBUTRIN SR) 100 MG 12 hr tablet Take 1 tablet (100 mg total) by mouth Daily at 8:00 am.. 90 tablet 3     cyanocobalamin (VITAMIN B-12) 500 MCG tablet Take 500 mcg by mouth daily.       diltiazem (DILACOR XR) 240 MG 24 hr capsule Take 1 capsule (240 mg total) by mouth daily. 90 capsule 3     docusate sodium (COLACE) 50 MG capsule Take 100 mg by mouth daily as needed for constipation.        FLUoxetine (PROZAC) 20 MG capsule Take 2 capsules (40 mg total) by mouth daily. 180 capsule 3     fluticasone-salmeterol (ADVAIR) 500-50 mcg/dose DISKUS Inhale 1 puff 2 (two) times a day.       furosemide (LASIX) 40 MG tablet Take 40 mg by mouth daily.       HYDROcodone-acetaminophen 5-325 mg per tablet Take 1 tablet by mouth every 6 (six) hours as needed for pain. 60 tablet 0     ipratropium-albuterol (DUO-NEB) 0.5-2.5 mg/3 mL nebulizer 3 mL every 6 (six) hours as needed.       LORazepam (ATIVAN) 1 MG tablet TAKE 1 TABLET(1 MG) BY MOUTH TWICE DAILY AS NEEDED FOR ANXIETY 60 tablet 0     metFORMIN (GLUCOPHAGE) 500 MG tablet TAKE 1 TABLET BY MOUTH THREE TIMES DAILY 270 tablet 1     nortriptyline (PAMELOR) 25 MG capsule Take 1 capsule (25 mg total) by mouth at bedtime. 90 capsule 3     OMEGA-3/DHA/EPA/FISH OIL (FISH OIL-OMEGA-3 FATTY ACIDS) 300-1,000 mg capsule Take 1 g by mouth daily.        omeprazole (PRILOSEC) 20 MG capsule TAKE 1 CAPSULE(20 MG) BY MOUTH TWICE DAILY 180 capsule 3     pioglitazone (ACTOS) 30 MG tablet Take 30 mg by mouth daily.       potassium chloride SA (K-DUR,KLOR-CON) 20 MEQ tablet Take 1 tablet (20 mEq total) by mouth daily. 90 tablet 1     sitaGLIPtin (JANUVIA) 100 MG tablet Take 1 tablet (100 mg total) by mouth daily. 90 tablet 3     No current facility-administered medications for this visit.      Allergies   Allergen Reactions     Insulin Aspart      Subcutaneous insulin inj in 10/2013 led to abdominal wall cellulitis requiring debridement     Monosodium Glutamate        EXAM  Vitals:    11/28/17 1354   BP: 128/68   Patient Site: Left Arm   Patient Position: Sitting   Pulse: 74   Weight: 195 lb 3.2 oz (88.5 kg)     General: Alert, no distress  Neurologic: Slightly unsteady gait  Skin: Bruises around the left orbit, laceration is secured, intact, no surrounding erythema or drainage.    Procedure description, area was cleansed with rubbing alcohol, 9 sutures were removed.  There are no  sutures remaining at the end of the visit.  The area was not dressed, but covered with triple antibiotic ointment.    Results reviewed: Reviewed ER course on November 21-22      Ethan Salcedo DO  Internal Medicine  [facility]

## 2021-06-14 NOTE — TELEPHONE ENCOUNTER
RN cannot approve Refill Request    RN can NOT refill this medication Protocol failed and NO refill given. Last office visit: 11/20/2020 Dar Wooten MD Last Physical: 9/14/2017 Last MTM visit: Visit date not found Last visit same specialty: 11/20/2020 Dar Wooten MD.  Next visit within 3 mo: Visit date not found  Next physical within 3 mo: Visit date not found      Neyda Marshall, [facility] Triage/Med Refill 1/2/2021    Requested Prescriptions   Pending Prescriptions Disp Refills     allopurinoL (ZYLOPRIM) 300 MG tablet [Pharmacy Med Name: ALLOPURINOL 300MG TABLETS] 90 tablet 3     Sig: TAKE 1 TABLET(300 MG) BY MOUTH DAILY       There is no refill protocol information for this order

## 2021-06-14 NOTE — TELEPHONE ENCOUNTER
Refilled    She has been telling me she takes 1 pill daily     does not support this.  This is the right patient?

## 2021-06-14 NOTE — PROGRESS NOTES
ASSESSMENT:  1. Wound of buttock, unspecified laterality, initial encounter  No evidence of shingles.  No evidence of cellulitis.  No evidence of virus.  Suspect fungus.  Begin aggressive oral and topical treatment.  If no improvement, referral to wound clinic  - fluconazole (DIFLUCAN) 100 MG tablet; Take 1 tablet (100 mg total) by mouth daily for 10 days.  Dispense: 10 tablet; Refill: 0  - terbinafine HCl (LAMISIL AT) 1 % cream; Apply to rash BID  Dispense: 30 g; Refill: 2  - Ambulatory referral to Wound Clinic    2. Controlled type 2 diabetes mellitus without complication, without long-term current use of insulin  Stable    3.  Fall.  With laceration.  Reviewed ER notes.  Laceration on left forehead healing.  No evidence of syncope    PLAN:  Patient Instructions   Fluconazole 100 mgs daily for 10 days, anti fungal pill     lamisil cream twice a day for 2 weeks.  Anti fungal cream, mix with cortaid for the first few days    We could send you to the wound clinic [place]    Soap and water, then pat dry.  Apply cream and cover if you want to    Stop the triple antibiotic cream          Orders Placed This Encounter   Procedures     Ambulatory referral to Wound Clinic     Referral Priority:   Routine     Referral Type:   Consultation     Referral Reason:   Evaluation and Treatment     Requested Specialty:   Wound Care     Number of Visits Requested:   1     There are no discontinued medications.    No Follow-up on file.    CHIEF COMPLAINT:  Chief Complaint   Patient presents with     Rash     buttocks, 3-4 weeks, burning        HISTORY OF PRESENT ILLNESS:  Lupe is a 77 y.o. female presenting to the clinic today with complaints of a rash.     Rash: She has had a rash on her buttocks for the past four weeks or so. The rash is on both cheeks and is very painful. She was initially trying to clean the affected area every three hours and then apply Cortaid to it. It started to heal, but now it has plateaued. She describes  "it as a burning pain. She needs to be very careful when sitting down. She has never had this before. She had been putting a triple antibiotic cream on the rash from time to time, but that did not seem to be overly helpful.     Fall: She fell in November and needed stitches above her left eye. She also suffered some bruising from the fall. She did not lose consciousness, but her bleeding was significant, so she went to the ED. She was not started on antibiotics afterward, but she put Neosporin on the cut.     Renal Cell Cancer: She had a mass on her left kidney biopsied in September, showing cancer. She plans to just watch the cancer for now. She has a follow up scheduled for January.     REVIEW OF SYSTEMS:   She has not had any antibiotics in the past couple of months. Her stomach feels fine. She has managed well without isosorbide. Taking nortriptyline has helped her sleep. All other systems are negative.    PFSH:  Reviewed, as below.     TOBACCO USE:  History   Smoking Status     Former Smoker     Quit date: 6/19/1960   Smokeless Tobacco     Never Used       VITALS:  Vitals:    12/06/17 1330   BP: 120/72   Patient Site: Left Arm   Patient Position: Sitting   Cuff Size: Adult Regular   Pulse: 66   Weight: 199 lb 11.2 oz (90.6 kg)   Height: 5' 2\" (1.575 m)     Wt Readings from Last 3 Encounters:   12/06/17 199 lb 11.2 oz (90.6 kg)   11/28/17 195 lb 3.2 oz (88.5 kg)   11/21/17 185 lb (83.9 kg)     Body mass index is 36.53 kg/(m^2).    PHYSICAL EXAM:  Constitutional:  Reveals an alert, pleasant, talkative woman. Affect appropriate.  Vitals:  Per nursing notes.  Skin:   Bilateral erythematous, excoriated areas on external cheeks of the buttocks and gluteal folds. Punched out ulcers, no herpetiform lesions.   Neurologic:  Cranial nerves II-XII intact.     Psychiatric:  Mood appropriate, memory intact.     MEDICATIONS:  Current Outpatient Prescriptions   Medication Sig Dispense Refill     ACCU-CHEK OLY PLUS TEST STRP " Strp TEST THREE TIMES DAILY 300 strip 13     albuterol (PROAIR HFA) 90 mcg/actuation inhaler Inhale 2 puffs every 6 (six) hours as needed for wheezing. 1 Inhaler 3     allopurinol (ZYLOPRIM) 300 MG tablet Take 1 tablet (300 mg total) by mouth daily. 90 tablet 3     aspirin 81 MG EC tablet Take 1 tablet (81 mg total) by mouth daily.  0     atorvastatin (LIPITOR) 10 MG tablet Take 1 tablet (10 mg total) by mouth daily. 90 tablet 3     buPROPion (WELLBUTRIN SR) 100 MG 12 hr tablet Take 1 tablet (100 mg total) by mouth Daily at 8:00 am.. 90 tablet 3     cyanocobalamin (VITAMIN B-12) 500 MCG tablet Take 500 mcg by mouth daily.       diltiazem (DILACOR XR) 240 MG 24 hr capsule Take 1 capsule (240 mg total) by mouth daily. 90 capsule 3     docusate sodium (COLACE) 50 MG capsule Take 100 mg by mouth daily as needed for constipation.       FLUoxetine (PROZAC) 20 MG capsule Take 2 capsules (40 mg total) by mouth daily. 180 capsule 3     fluticasone-salmeterol (ADVAIR) 500-50 mcg/dose DISKUS Inhale 1 puff 2 (two) times a day.       furosemide (LASIX) 40 MG tablet Take 40 mg by mouth daily.       HYDROcodone-acetaminophen 5-325 mg per tablet Take 1 tablet by mouth every 6 (six) hours as needed for pain. 60 tablet 0     ipratropium-albuterol (DUO-NEB) 0.5-2.5 mg/3 mL nebulizer 3 mL every 6 (six) hours as needed.       LORazepam (ATIVAN) 1 MG tablet TAKE 1 TABLET(1 MG) BY MOUTH TWICE DAILY AS NEEDED FOR ANXIETY 60 tablet 0     metFORMIN (GLUCOPHAGE) 500 MG tablet TAKE 1 TABLET BY MOUTH THREE TIMES DAILY 270 tablet 1     nortriptyline (PAMELOR) 25 MG capsule Take 1 capsule (25 mg total) by mouth at bedtime. 90 capsule 3     OMEGA-3/DHA/EPA/FISH OIL (FISH OIL-OMEGA-3 FATTY ACIDS) 300-1,000 mg capsule Take 1 g by mouth daily.        omeprazole (PRILOSEC) 20 MG capsule TAKE 1 CAPSULE(20 MG) BY MOUTH TWICE DAILY 180 capsule 3     pioglitazone (ACTOS) 30 MG tablet Take 30 mg by mouth daily.       potassium chloride SA (K-DUR,KLOR-CON)  20 MEQ tablet Take 1 tablet (20 mEq total) by mouth daily. 90 tablet 1     sitaGLIPtin (JANUVIA) 100 MG tablet Take 1 tablet (100 mg total) by mouth daily. 90 tablet 3     fluconazole (DIFLUCAN) 100 MG tablet Take 1 tablet (100 mg total) by mouth daily for 10 days. 10 tablet 0     terbinafine HCl (LAMISIL AT) 1 % cream Apply to rash BID 30 g 2     No current facility-administered medications for this visit.        ADDITIONAL HISTORY SUMMARIZED (2): Reviewed September renal biopsy notes regarding cancer and lack of IV antibiotics.   DECISION TO OBTAIN EXTRA INFORMATION (1): None.   RADIOLOGY TESTS (1): None.  LABS (1): Labs from 10/24/2017 reviewed.   MEDICINE TESTS (1): None.  INDEPENDENT REVIEW (2 each): None.     The visit lasted a total of 16 minutes face to face with the patient. Over 50% of the time was spent counseling and educating the patient about her rash.    ITeodoro, am scribing for and in the presence of, Dr. Wooten.    I, Dr. Wooten, personally performed the services described in this documentation, as scribed by Teodoro Ibarra in my presence, and it is both accurate and complete.    Total data points: 3

## 2021-06-15 PROBLEM — E78.00 PURE HYPERCHOLESTEROLEMIA: Status: ACTIVE | Noted: 2017-01-26

## 2021-06-15 PROBLEM — N18.30 TYPE 2 DIABETES MELLITUS WITH STAGE 3 CHRONIC KIDNEY DISEASE, WITHOUT LONG-TERM CURRENT USE OF INSULIN (H): Status: ACTIVE | Noted: 2017-01-26

## 2021-06-15 PROBLEM — E11.22 TYPE 2 DIABETES MELLITUS WITH STAGE 3 CHRONIC KIDNEY DISEASE, WITHOUT LONG-TERM CURRENT USE OF INSULIN (H): Status: ACTIVE | Noted: 2017-01-26

## 2021-06-15 NOTE — TELEPHONE ENCOUNTER
Reason for Call:  Other call back      Detailed comments: PATIENT CALLED HER PHARMACY TO PICKUP HER LORAZAPAM AND THEY TOLD HER IT WAS NOT THERE    PLEASE ADVISE    Phone Number Patient can be reached at: Home number on file [phone] (home)    Best Time: ANYTIME    Can we leave a detailed message on this number?: Yes    Call taken on 2/5/2021 at 10:43 AM by Leatha New

## 2021-06-15 NOTE — TELEPHONE ENCOUNTER
Central PA team  [phone]  Pool: HE PA MED ([id number])          PA has been initiated.       PA form completed and faxed insurance via Cover My Meds     Key:  [id number]     Medication:  LORAZEPAM 1MG    Insurance:  [employer]         Response will be received via fax and may take up to 5-10 business days depending on plan

## 2021-06-15 NOTE — TELEPHONE ENCOUNTER
Refill Approved    Rx renewed per Medication Renewal Policy. Medication was last renewed on 2/15/20, last OV 11/20/20.    Neyda Marshall, [facility] Triage/Med Refill 2/21/2021     Requested Prescriptions   Pending Prescriptions Disp Refills     metoprolol succinate (TOPROL-XL) 50 MG 24 hr tablet [Pharmacy Med Name: METOPROLOL ER SUCCINATE 50MG TABS] 90 tablet 3     Sig: TAKE 1 TABLET(50 MG) BY MOUTH AT BEDTIME       Beta-Blockers Refill Protocol Passed - 2/21/2021 10:38 AM        Passed - PCP or prescribing provider visit in past 12 months or next 3 months     Last office visit with prescriber/PCP: 11/20/2020 Dar Wootne MD OR same dept: Visit date not found OR same specialty: 11/20/2020 Dar Wooten MD  Last physical: 9/14/2017 Last MTM visit: Visit date not found   Next visit within 3 mo: Visit date not found  Next physical within 3 mo: Visit date not found  Prescriber OR PCP: Dar Wooten MD  Last diagnosis associated with med order: 1. Essential hypertension  - metoprolol succinate (TOPROL-XL) 50 MG 24 hr tablet [Pharmacy Med Name: METOPROLOL ER SUCCINATE 50MG TABS]; TAKE 1 TABLET(50 MG) BY MOUTH AT BEDTIME  Dispense: 90 tablet; Refill: 3    If protocol passes may refill for 12 months if within 3 months of last provider visit (or a total of 15 months).             Passed - Blood pressure filed in past 12 months     BP Readings from Last 1 Encounters:   11/20/20 128/78

## 2021-06-16 PROBLEM — S72.001D CLOSED FRACTURE OF RIGHT HIP WITH ROUTINE HEALING: Status: ACTIVE | Noted: 2020-03-17

## 2021-06-16 PROBLEM — Z98.890 HISTORY OF CORONARY ANGIOGRAM: Status: ACTIVE | Noted: 2018-10-05

## 2021-06-16 PROBLEM — E55.9 VITAMIN D DEFICIENCY: Status: ACTIVE | Noted: 2020-11-20

## 2021-06-16 PROBLEM — I50.22 CHRONIC SYSTOLIC CONGESTIVE HEART FAILURE (H): Status: ACTIVE | Noted: 2017-07-30

## 2021-06-16 PROBLEM — Z95.1 HX OF CABG: Status: ACTIVE | Noted: 2017-07-29

## 2021-06-16 PROBLEM — I35.0 AORTIC STENOSIS, MILD: Status: ACTIVE | Noted: 2017-07-30

## 2021-06-16 PROBLEM — Z88.8: Chronic | Status: ACTIVE | Noted: 2018-10-05

## 2021-06-16 PROBLEM — C64.2: Status: ACTIVE | Noted: 2017-08-02

## 2021-06-16 PROBLEM — C64.2 RENAL CELL CANCER, LEFT (H): Status: ACTIVE | Noted: 2017-10-24

## 2021-06-16 PROBLEM — N18.32 STAGE 3B CHRONIC KIDNEY DISEASE (H): Status: ACTIVE | Noted: 2020-03-18

## 2021-06-16 PROBLEM — E66.01 SEVERE OBESITY (BMI 35.0-35.9 WITH COMORBIDITY) (H): Status: ACTIVE | Noted: 2018-07-31

## 2021-06-16 NOTE — TELEPHONE ENCOUNTER
Reason for Call: Request for an order or referral:    Order or referral being requested: AUDIOLOGY     Date needed: as soon as possible    Has the patient been seen by the PCP for this problem? YES and NO    Additional comments: PT HAS AN APPT ON 03/29/2021 WITH [facility]   PLEASE FAX ORDER TO:  [phone]    Phone number Patient can be reached at:    Cell number on file:    Telephone Information:   Mobile [phone]       Best Time:  ANYTIME    Can we leave a detailed message on this number?  Yes    Call taken on 3/23/2021 at 9:36 AM by Leatha New

## 2021-06-16 NOTE — TELEPHONE ENCOUNTER
Telephone Encounter by Maria C Montemayor LPN at 8/22/2019  9:50 AM     Author: Maria C Montemayor LPN Service: -- Author Type: Licensed Nurse    Filed: 8/22/2019  9:55 AM Encounter Date: 8/21/2019 Status: Signed    : Maria C Montemayor LPN (Licensed Nurse)       Medication: Lorazepam  Last Date Filled 8/2/19   pulled: YES         Only PCP Prescribing? : YES  Taken as prescribed from physician notes? YES OV 12/28/19    3. Anxiety  Lorazepam use increasing.  Increase dose of Abilify  - Drugs of Abuse 1,Urine  - LORazepam (ATIVAN) 1 MG tablet; Take 1 tablet  (1MG) by mouth twice daily as needed for anxiety  Dispense: 30 tablet; Refill: 1  - ARIPiprazole (ABILIFY) 5 MG tablet; Take 1 tablet (5 mg total) by mouth daily.  Dispense: 90 tablet; Refill: 3    CSA in last year: YES  Random Utox in last year: YES  (if any of the above answer NO - schedule with PCP)     Opioids + benzodiazepines? NO  (if the above answer YES - schedule with PCP every 6 months)     Is patient on the Executive Review Team? No      All responses suggest: Refilling prescription

## 2021-06-16 NOTE — TELEPHONE ENCOUNTER
Telephone Encounter by Kassidy Barrett CMA at 5/9/2019 12:19 PM     Author: Kassidy Barrett CMA Service: -- Author Type: Certified Medical Assistant    Filed: 5/9/2019 12:24 PM Encounter Date: 5/9/2019 Status: Signed    : Kassidy Barrett CMA (Certified Medical Assistant)       Medication: Lorazepam   Last Date Filled 4/20/19   pulled: YES    Only PCP Prescribing? : YES  Taken as prescribed from physician notes? YES    CSA in last year: YES  Random Utox in last year: YES  (if any of the above answer NO - schedule with PCP)   Drugs of Abuse 1,Urine   Order: [id number]   Status:  Final result   Visible to patient:  No (Not Released)   Next appt:  None   Dx:  Anxiety; Encounter for therapeutic .     Ref Range & Units 12/28/18 1620    Amphetamines Screen Negative Screen Negative     Benzodiazepines Screen Negative Screen Negative     Opiates Screen Negative Screen Negative     Phencyclidine Screen Negative Screen Negative     THC Screen Negative Screen Negative     Barbiturates Screen Negative Screen Negative     Cocaine Metabolite Screen Negative Screen Negative     Oxycodone Screen Negative Screen Negative     Creatinine, Urine mg/dL 10.9    Comment: Urine very dilute; suggest recollection.   Resulting Agency  [facility]      Narrative   Performed by: [facility]   Drug                  Screening Threshold    Amphetamines           1000 ng/mL  Benzodiazepine          200 ng/mL  Opiates                 300 ng/mL  Phencyclidine            25 ng/mL  THC Metabolite           50 ng/mL  Barbiturates            200 ng/mL  Cocaine Metabolite      150 ng/mL  Oxycodone               100 ng/mL    Screening results are to be used only for medical purposes.  Unconfirmed screening results must not be used for non-  medical purposes.      Specimen Collected: 12/28/18 16:20   Last Resulted: 12/28/18 19:33             Utox neg for benzos    Opioids + benzodiazepines? NO  (if the above answer YES - schedule with PCP every 6 months)     Is  patient on the Executive Review Team? NO      All responses suggest: Schedule with pcp or renetta

## 2021-06-16 NOTE — TELEPHONE ENCOUNTER
Telephone Encounter by Antonietta Briceno at 3/8/2019  8:48 AM     Author: Antonietta Briceno Service: -- Author Type: --    Filed: 3/8/2019  8:49 AM Encounter Date: 3/5/2019 Status: Signed    : Antonietta Briceno APPROVED:    Approval start date: 2/5/2019  Approval end date: 3/6/2022    Pharmacy has been notified of approval and will contact patient when medication is ready for pickup.

## 2021-06-16 NOTE — TELEPHONE ENCOUNTER
Telephone Encounter by Kassidy Barrett CMA at 2019  2:41 PM     Author: Kassidy Barrett CMA Service: -- Author Type: Certified Medical Assistant    Filed: 2019  2:44 PM Encounter Date: 2019 Status: Signed    : Kassidy Barrett CMA (Certified Medical Assistant)       FD Please help schedule pt for med check with pcp      Medication: Lorazepam   Last Date Filled 19   pulled: YES    Only PCP Prescribing? : YES  Taken as prescribed from physician notes? YES    CSA in last year:  10/17/18  Random Utox in last year: NO  (if any of the above answer NO - schedule with PCP)     Opioids + benzodiazepines? NO  (if the above answer YES - schedule with PCP every 6 months)     Is patient on the Executive Review Team? NO      All responses suggest: Scheduling with PCP for further intervention    Return in about 3 months (around 9/10/2019) for for a full review of medications and lab testing.

## 2021-06-16 NOTE — TELEPHONE ENCOUNTER
Telephone Encounter by Kassidy Barrett CMA at 1/30/2019  2:36 PM     Author: Kassidy Barrett CMA Service: -- Author Type: Certified Medical Assistant    Filed: 1/30/2019  2:40 PM Encounter Date: 1/29/2019 Status: Signed    : Kassidy Barrett CMA (Certified Medical Assistant)       Medication: Lorazepam   Last Date Filled 01/13/19   pulled: YES    Only PCP Prescribing? : YES  Taken as prescribed from physician notes? YES    CSA in last year: YES  Random Utox in last year: YES  (if any of the above answer NO - schedule with PCP)     Opioids + benzodiazepines? NO  (if the above answer YES - schedule with PCP every 6 months)     All responses suggest: Refilling prescription

## 2021-06-16 NOTE — TELEPHONE ENCOUNTER
Refill Approved    Rx renewed per Medication Renewal Policy. Medication was last renewed on 4/20/20.    Ruben Brito, [facility] Triage/Med Refill 4/12/2021     Requested Prescriptions   Pending Prescriptions Disp Refills     atorvastatin (LIPITOR) 10 MG tablet [Pharmacy Med Name: ATORVASTATIN 10MG TABLETS] 90 tablet 2     Sig: TAKE 1 TABLET(10 MG) BY MOUTH DAILY       Statins Refill Protocol (Hmg CoA Reductase Inhibitors) Passed - 4/10/2021  4:53 PM        Passed - PCP or prescribing provider visit in past 12 months      Last office visit with prescriber/PCP: 11/20/2020 Dar Wooten MD OR same dept: Visit date not found OR same specialty: 11/20/2020 Dar Wooten MD  Last physical: 9/14/2017 Last MTM visit: Visit date not found   Next visit within 3 mo: Visit date not found  Next physical within 3 mo: Visit date not found  Prescriber OR PCP: Dar Wooten MD  Last diagnosis associated with med order: 1. Pure hypercholesterolemia  - atorvastatin (LIPITOR) 10 MG tablet [Pharmacy Med Name: ATORVASTATIN 10MG TABLETS]; TAKE 1 TABLET(10 MG) BY MOUTH DAILY  Dispense: 90 tablet; Refill: 2    If protocol passes may refill for 12 months if within 3 months of last provider visit (or a total of 15 months).

## 2021-06-16 NOTE — PROGRESS NOTES
ASSESSMENT:  1. Controlled type 2 diabetes mellitus without complication, without long-term current use of insulin  Update A1c.  No hypoglycemia  - Glycosylated Hemoglobin A1c    2. Gastroesophageal reflux disease with esophagitis  - omeprazole (PRILOSEC) 20 MG capsule; TAKE 1 CAPSULE(20 MG) BY MOUTH TWICE DAILY  Dispense: 180 capsule; Refill: 3    3. Renal cell cancer, left  Stable.  Monitoring    4. Coronary atherosclerosis due to calcified coronary lesion  No chest pain.  Continue medication.  No need for cardiology follow-up    5. Iron deficiency anemia due to chronic blood loss  - HM2(CBC w/o Differential)    6. Essential hypertension with goal blood pressure less than 140/90  Stable  - Comprehensive Metabolic Panel        PLAN:  Patient Instructions   Update blood tests    Refill meds    Anything changes, we try to sort it out  You do not need to see a cardiologist       Orders Placed This Encounter   Procedures     Comprehensive Metabolic Panel     HM2(CBC w/o Differential)     Glycosylated Hemoglobin A1c     Medications Discontinued During This Encounter   Medication Reason     diltiazem (DILACOR XR) 240 MG 24 hr capsule Therapy completed     diltiazem (DILACOR XR) 240 MG 24 hr capsule Reorder     nortriptyline (PAMELOR) 25 MG capsule Reorder     LORazepam (ATIVAN) 1 MG tablet Reorder     furosemide (LASIX) 40 MG tablet Reorder     atorvastatin (LIPITOR) 10 MG tablet Reorder     omeprazole (PRILOSEC) 20 MG capsule Reorder       Return in about 3 months (around 6/2/2018).    CHIEF COMPLAINT:  Chief Complaint   Patient presents with     Medication Management     med check      Diabetes       HISTORY OF PRESENT ILLNESS:  Lupe is a 78 y.o. female presenting to the clinic today for follow up for diabetes and medication check.     Type 2 Diabetes: She continues on metformin 500mg three times daily, pioglitazone 30mg, and Januvia 100mg daily. Her A1c was 7.3 on 10/24/17.     COPD: She has used her rescue  inhaler several times this week due to the wet weather. Otherwise, she only needs it about once or twice a month. She uses her Advair inhaler daily, morning and evening. She says her breathing is good.     Pain: She denies gout pain. She is not taking pain medication at all. She treats occasional pain with Tylenol.     Depression: She continues on bupropion 100mg daily, which she says is effective for mood. She takes lorazepam 1mg no more than three times weekly for stressful situations. She says this is not every week.     Kidney Stone: She has a left UPJ stone noted on CT scan of 1/3/18. She does not have any symptoms and will have a follow up scan in June.       REVIEW OF SYSTEMS:   She is fully recovered from a fall in November with a head laceration.   She notes that she has not needed to use nitro.   All other systems are negative.    PFSH:      TOBACCO USE:  History   Smoking Status     Former Smoker     Quit date: 6/19/1960   Smokeless Tobacco     Never Used       VITALS:  Vitals:    03/02/18 1432   BP: 122/68   Patient Site: Left Arm   Patient Position: Sitting   Cuff Size: Adult Regular   Pulse: 72   Weight: 192 lb 9.6 oz (87.4 kg)     Wt Readings from Last 3 Encounters:   03/02/18 192 lb 9.6 oz (87.4 kg)   12/06/17 199 lb 11.2 oz (90.6 kg)   11/28/17 195 lb 3.2 oz (88.5 kg)     Body mass index is 35.23 kg/(m^2).    MEDICATIONS:  Current Outpatient Prescriptions   Medication Sig Dispense Refill     ACCU-CHEK OLY PLUS TEST STRP Strp TEST THREE TIMES DAILY 300 strip 13     albuterol (PROAIR HFA) 90 mcg/actuation inhaler Inhale 2 puffs every 6 (six) hours as needed for wheezing. 1 Inhaler 3     allopurinol (ZYLOPRIM) 300 MG tablet Take 1 tablet (300 mg total) by mouth daily. 90 tablet 3     aspirin 81 MG EC tablet Take 1 tablet (81 mg total) by mouth daily.  0     atorvastatin (LIPITOR) 10 MG tablet Take 1 tablet (10 mg total) by mouth daily. 90 tablet 3     buPROPion (WELLBUTRIN SR) 100 MG 12 hr tablet  Take 1 tablet (100 mg total) by mouth Daily at 8:00 am.. 90 tablet 3     cyanocobalamin (VITAMIN B-12) 500 MCG tablet Take 500 mcg by mouth daily.       diltiazem (DILACOR XR) 240 MG 24 hr capsule Take 1 capsule (240 mg total) by mouth daily. 90 capsule 3     docusate sodium (COLACE) 50 MG capsule Take 100 mg by mouth daily as needed for constipation.       FLUoxetine (PROZAC) 20 MG capsule Take 2 capsules (40 mg total) by mouth daily. 180 capsule 3     fluticasone-salmeterol (ADVAIR) 500-50 mcg/dose DISKUS Inhale 1 puff 2 (two) times a day.       furosemide (LASIX) 40 MG tablet TAKE 1 TABLET BY MOUTH DAILY 90 tablet 2     furosemide (LASIX) 40 MG tablet Take 1 tablet (40 mg total) by mouth daily. 90 tablet 3     HYDROcodone-acetaminophen 5-325 mg per tablet Take 1 tablet by mouth every 6 (six) hours as needed for pain. 60 tablet 0     ipratropium-albuterol (DUO-NEB) 0.5-2.5 mg/3 mL nebulizer 3 mL every 6 (six) hours as needed.       LORazepam (ATIVAN) 1 MG tablet TAKE 1 TABLET(1 MG) BY MOUTH TWICE DAILY AS NEEDED FOR ANXIETY 30 tablet 0     LORazepam (ATIVAN) 1 MG tablet TAKE 1 TABLET(1 MG) BY MOUTH TWICE DAILY AS NEEDED FOR ANXIETY 20 tablet 1     metFORMIN (GLUCOPHAGE) 500 MG tablet TAKE 1 TABLET BY MOUTH THREE TIMES DAILY 270 tablet 1     naproxen (NAPROSYN) 500 MG tablet TAKE 1 TABLET(500 MG) BY MOUTH DAILY 90 tablet 0     nortriptyline (PAMELOR) 25 MG capsule Take 1 capsule (25 mg total) by mouth at bedtime. 90 capsule 3     OMEGA-3/DHA/EPA/FISH OIL (FISH OIL-OMEGA-3 FATTY ACIDS) 300-1,000 mg capsule Take 1 g by mouth daily.        omeprazole (PRILOSEC) 20 MG capsule TAKE 1 CAPSULE(20 MG) BY MOUTH TWICE DAILY 180 capsule 3     pioglitazone (ACTOS) 30 MG tablet Take 30 mg by mouth daily.       potassium chloride SA (K-DUR,KLOR-CON) 20 MEQ tablet TAKE 1 TABLET(20 MEQ) BY MOUTH DAILY 90 tablet 2     sitaGLIPtin (JANUVIA) 100 MG tablet Take 1 tablet (100 mg total) by mouth daily. 90 tablet 3     terbinafine HCl  (LAMISIL AT) 1 % cream Apply to rash BID 30 g 2     No current facility-administered medications for this visit.        PHYSICAL EXAM:  Constitutional:  Reveals a very pleasant, slightly nervous woman.  Vitals:  Per nursing notes.  Cardiac:  Regular rate and rhythm without murmurs, rubs, or gallops.  Legs without edema. Palpation of the radial pulse regular.  Lungs: Clear.  Respiratory effort normal.  Rheumatologic: Arthritic changes in hands.   Neurologic:  Cranial nerves II-XII intact.     Psychiatric:  Mood appropriate, memory intact.     QUALITY MEASURES:   The following are part of a depression follow up plan for the patient:  mental health care management      ADDITIONAL HISTORY SUMMARIZED (2): Reviewed Urology note 1/30/18 regarding kidney stone. .  DECISION TO OBTAIN EXTRA INFORMATION (1): None.   RADIOLOGY TESTS (1): Reviewed abdominal CT of 1/3/18. .  LABS (1): Labs ordered.   MEDICINE TESTS (1): None.  INDEPENDENT REVIEW (2 each): None.     The visit lasted a total of 14 minutes face to face with the patient. Over 50% of the time was spent counseling and educating the patient about her medications. An additional 3 minutes was spent discussing depression.     I, Sebas Hobbs, am scribing for and in the presence of, Dr. Wooten.    I, Dr Wooten, personally performed the services described in this documentation, as scribed by Sebas Hobbs in my presence, and it is both accurate and complete.    Total data points: 4

## 2021-06-16 NOTE — TELEPHONE ENCOUNTER
Telephone Encounter by Vera Moncada CMA at 9/13/2019 12:40 PM     Author: Vera Moncada CMA Service: -- Author Type: Certified Medical Assistant    Filed: 9/13/2019 12:42 PM Encounter Date: 9/13/2019 Status: Signed    : Vera Moncada CMA (Certified Medical Assistant)       Medication: Lorazepam  Last Date Filled 8/22/19   pulled: YES    Only PCP Prescribing? : YES  Taken as prescribed from physician notes? YES    CSA in last year: YES  Random Utox in last year: YES  (if any of the above answer NO - schedule with PCP)     Opioids + benzodiazepines? YES  (if the above answer YES - schedule with PCP every 6 months)     Is patient on the Executive Review Team? no      All responses suggest: Refilling prescription

## 2021-06-16 NOTE — TELEPHONE ENCOUNTER
Telephone Encounter by Maria C Montemayor LPN at 1/17/2020 11:33 AM     Author: Maria C Montemayor LPN Service: -- Author Type: Licensed Nurse    Filed: 1/17/2020 11:39 AM Encounter Date: 1/15/2020 Status: Signed    : Maria C Montemayor LPN (Licensed Nurse)       Medication: Lorazepam  Last Date Filled 11/29/19   pulled: YES         Only PCP Prescribing? : YES  Taken as prescribed from physician notes? YES OV 8/26/19    Anxiety: She has had more stress in the last few months with her family.  Her daughter was in the hospital and had to have surgery and now has had a complication.  She is starting to feel that these events are calming down a little bit.  Notes that she continues to take lorazepam 1 mg twice daily as needed, this use has not changed.  There are about 3 days/week where she does not require lorazepam.  She has decreased her dose of Abilify down to 2 mg daily, notes that her diarrhea has resolved.  She has also appropriately discontinued vitamin D and fish oil.  She would like to get her B12 injection today as well she continues to suffer from some fatigue.     CSA in last year: NO  Random Utox in last year: NO  (if any of the above answer NO - schedule with PCP)     Opioids + benzodiazepines? NO  (if the above answer YES - schedule with PCP every 6 months)     Is patient on the Executive Review Team? no      All responses suggest: Scheduling with PCP for further intervention Spoke with pt and scheduled her for an appt with pcp for 1/23/20.

## 2021-06-17 NOTE — PROGRESS NOTES
Medication Therapy Management Initial Visit     ASSESSMENT AND PLAN  1. Controlled type 2 diabetes mellitus without complication, without long-term current use of insulin  Not at goal A1c less than 8% per ADA guidelines.  She had gone about 4 months without her Actos based on a mistake at the pharmacy, because her A1c had increased to Dr. Wooten increase to 45 mg daily, assuming that she had been taking this medication.  Over the last 2 weeks she has had increased edema in her legs on a higher dose of Actos, therefore I have sent a new refill for a dose decrease of 30 mg daily of Actos, she will restart her previously tolerated dose.  Also encouraged her to increase to max dose metformin at 4 tablets daily.  Will reassess A1c at follow-up.  Stable on aspirin, statin, no ARB or ACE inhibitor at this time.  -Decrease pioglitazone (ACTOS) 30 MG tablet; Take 1 tablet (30 mg total) by mouth daily.  Dispense: 90 tablet; Refill: 3  -Increase metformin to 2000 mg daily    2. Coronary atherosclerosis due to calcified coronary lesion  At goal blood pressure less than 140/90 per JNC 8, on aspirin, statin.  Per PCP she is stable.    3. Chronic obstructive pulmonary disease, unspecified COPD type  Stable, with infrequent use of short acting beta agonist.  Provide education that if she does have excessive phlegm she could use her DuoNeb to help dry up phlegm.  Recommend continue current regimen.    4. Elevated uric acid in blood  Uric acid level is very low, under 5, this has seemed to help over time with her chronic arthritis.  Recommend continue current dose of allopurinol.  In the future may consider titrating down to 100 mg daily and reassess as long as her uric acid level is below 5.  -In the future may consider decreasing allopurinol to 100 mg daily     5. Gastroesophageal reflux disease with esophagitis  Stable. Recommended to continue current regimen.     6. Anxiety  Stable. Recommended to  continue current regimen.     7. Arthritis  Stable. Recommended to continue current regimen.     8. Preventative health care  Normal vitamin D level, recommend to continue.  Discussed the benefits of goal calcium daily of 1200 mg, between diet and supplements.  Provided her with a sheet of calcium-containing foods so that she can identify how much she is getting every day.  Encouraged her to obtain calcium citrate as this will be better absorbed while she is taking omeprazole.  Also encouraged her to have her DEXA scan completed.  -Initiate calcium citrate    9. Anemia due to chronic illness  Stable, however she does have constipation, recommended that she trial daily fiber supplement to help bring water into her colon to soften up her stools.  -Initiate psyllium fiber daily    FOLLOW-UP PLAN  Lupe was advised to follow up with Dr. SWANN as directed.    The following instructions were given:   Patient Instructions   Increase metformin to 4 tabs daily - help with blood sugars     Filled out paperwork to see if we can get Januvia for free     Make sure you will get pioglitazone, make sure it is the 30mg dose for blood sugars (lower dose to help with leg puffiness)    Only check sugars once daily in the morning     Add calcium citrate to goal 1200mg daily total between diet + supplements - have DXA bone scan completed     Purchase fiber (psyllium) powder and use daily to see if this loosens up your stools.     Please call with questions or concerns!     Hannah       SUBJECTIVE AND OBJECTIVE  uLpe Hill is a 78 y.o. female who was referred by Dar Wooten MD for MTM services.  Lupe's chief concern today is medication management. She has been having issues with her pharmacy, and is frustrated. She has been getting her medications from [facility]. Her and her  still live independently.     Diabetes: in the past she had taken insulin, had gotten severe infections, leading to hospitalization. Januvia is >$400  every 90 days for her, she is wondering if there are any cheaper products that she could consider.  She called the pharmacy about four months ago for refill pioglitazone, however they did not ever fill this for her. Therefore she has not been on pioglitazone for several months until she picked up the new higher dose of 45mg daily, she has since noticed worsening puffiness in her legs. Same activity and diet. She does check her sugars three times daily, she prefers to do this.   Lab Results   Component Value Date    HGBA1C 8.6 (H) 2018     CAD: denies chest pain, she rarely ever takes nitroglycerin, this is not . She does have a cardiologist, however [facility]     COPD: On her daily controller of Advair, only needs albuterol maybe a few times per month. She may use her nebulizer about once per month.     Elevated uric acid:  Has never had a gout flare, but due to elevated uric acid was started on allopurinol, this has helped with her chronic pains.   Lab Results   Component Value Date    URICACID 2.9 2017     GERD: BID PPI, no more breakthrough acid reflux while on PPI. She does know that she has some trigger foods and avoids these.     Anxiety: Only uses a few lorazepam as needed during the week, she has panic attacks sometimes when she is getting ready to leave the house. These are very effective for her. She feels that fluoxetine and bupropion are very effective and well tolerated.     Arthritis: Her vicodin are used very infrequently. She takes 1 tab of naproxen daily and this is effective for her.     Bone Health: salads every night, no milk, but she does use cheese and yogurt at times, diabetic ice cream. She is due for a DXA scan.    Vitamin D, Total (25-Hydroxy)   Date Value Ref Range Status   07/10/2017 38.7 30.0 - 80.0 ng/mL Final     Anemia: she takes one tab iron daily. Her stools are large and has had problems since she was 2 years old. She has as needed stool  softeners, but has never used fiber.     Adherence: Lupe misses 0 doses of medications per week. She does use a pill box at home.    This note has been dictated using voice recognition software. Any grammatical or context distortions are unintentional and inherent to the software.    Lupe was provided with follow up instructions, and this care plan was communicated via EMR with her primary care provider, Dar Wooten MD, and is the authorizing prescriber for this visit.  Direct supervision was available by either the patient's PCP or another available physician when needed.    Time spent: 45 minutes  Level of service: 5    Sebas Moncada, PharmD, BCACP  Medication Management (MTM) Pharmacist  [facility]    We reviewed Lupe's medication list with them, discussing reason for use, directions for use, and potential side effects of each medication.  Indication, safety, efficacy, and convenience was assessed for all reviewed medications.  No environmental factors were noted currently affecting patient.    Current Outpatient Prescriptions   Medication Sig Dispense Refill     calcium citrate (CALCITRATE) 200 mg (950 mg) tablet Take 1 tablet by mouth 2 (two) times a day.       cholecalciferol, vitamin D3, 5,000 unit Tab Take 1 tablet by mouth daily.       ferrous sulfate 325 (65 FE) MG tablet Take 1 tablet by mouth daily with breakfast.       vitamin E 400 unit capsule Take 400 Units by mouth daily.       ACCU-CHEK OLY PLUS TEST STRP Strp TEST THREE TIMES DAILY 300 strip 13     albuterol (PROAIR HFA) 90 mcg/actuation inhaler Inhale 2 puffs every 6 (six) hours as needed for wheezing. 1 Inhaler 3     allopurinol (ZYLOPRIM) 300 MG tablet Take 1 tablet (300 mg total) by mouth daily. 90 tablet 3     aspirin 81 MG EC tablet Take 1 tablet (81 mg total) by mouth daily.  0     atorvastatin (LIPITOR) 10 MG tablet Take 1 tablet (10 mg total) by mouth daily. 90 tablet 3     buPROPion (WELLBUTRIN SR) 100 MG 12 hr  tablet Take 1 tablet (100 mg total) by mouth Daily at 8:00 am.. 90 tablet 3     cyanocobalamin (VITAMIN B-12) 500 MCG tablet Take 500 mcg by mouth daily.       diltiazem (DILACOR XR) 240 MG 24 hr capsule Take 1 capsule (240 mg total) by mouth daily. 90 capsule 3     docusate sodium (COLACE) 50 MG capsule Take 100 mg by mouth daily as needed for constipation.       FLUoxetine (PROZAC) 20 MG capsule Take 2 capsules (40 mg total) by mouth daily. 180 capsule 3     fluticasone-salmeterol (ADVAIR) 500-50 mcg/dose DISKUS Inhale 1 puff 2 (two) times a day.       furosemide (LASIX) 40 MG tablet Take 1 tablet (40 mg total) by mouth daily. 90 tablet 3     HYDROcodone-acetaminophen 5-325 mg per tablet Take 1 tablet by mouth every 6 (six) hours as needed for pain. 60 tablet 0     ipratropium-albuterol (DUO-NEB) 0.5-2.5 mg/3 mL nebulizer 3 mL every 6 (six) hours as needed.       LORazepam (ATIVAN) 1 MG tablet TAKE 1 TABLET(1 MG) BY MOUTH TWICE DAILY AS NEEDED FOR ANXIETY 20 tablet 1     metFORMIN (GLUCOPHAGE) 500 MG tablet Take 2 tablets (1,000 mg total) by mouth 2 (two) times a day. 360 tablet 3     naproxen (NAPROSYN) 500 MG tablet TAKE 1 TABLET(500 MG) BY MOUTH DAILY 90 tablet 0     nortriptyline (PAMELOR) 25 MG capsule Take 1 capsule (25 mg total) by mouth at bedtime as needed. 90 capsule 3     OMEGA-3/DHA/EPA/FISH OIL (FISH OIL-OMEGA-3 FATTY ACIDS) 300-1,000 mg capsule Take 1 g by mouth daily.        omeprazole (PRILOSEC) 20 MG capsule TAKE 1 CAPSULE(20 MG) BY MOUTH TWICE DAILY 180 capsule 3     pioglitazone (ACTOS) 30 MG tablet Take 1 tablet (30 mg total) by mouth daily. 90 tablet 3     potassium chloride SA (K-DUR,KLOR-CON) 20 MEQ tablet TAKE 1 TABLET(20 MEQ) BY MOUTH DAILY 90 tablet 2     sitaGLIPtin (JANUVIA) 100 MG tablet Take 1 tablet (100 mg total) by mouth daily. 90 tablet 3     No current facility-administered medications for this visit.

## 2021-06-17 NOTE — PROGRESS NOTES
ASSESSMENT:  1. Controlled type 2 diabetes mellitus without complication, without long-term current use of insulin  Uncontrolled at last visit.  Metformin increased and pioglitazone decreased.  Minimal hypoglycemia.  Edema slightly better.  Recheck  - Glycosylated Hemoglobin A1c    2. Essential hypertension with goal blood pressure less than 140/90  Stable.  Furosemide held last week during illness with worsening edema.  She has now resumed    3. Chronic obstructive pulmonary disease with acute exacerbation  Reviewed emergency room visit, notes, her questioning of a collapsed lung, review of chest x-ray personally, and explanation of atelectasis on right side.  No evidence of pneumothorax.  Symptoms completely resolved    Also discussed home treatment program for next episode to avoid emergency room    4. Anxiety  - LORazepam (ATIVAN) 1 MG tablet; Take 1 tablet (1 mg total) by mouth every 8 (eight) hours as needed for anxiety.  Dispense: 30 tablet; Refill: 5      PLAN:  Patient Instructions   Just in case, for next time:  Doxycycline for a week antibiotic  Prednisone 20 mgs daily for a week      Update diabetes test today    Refilled lorazepam      Orders Placed This Encounter   Procedures     Glycosylated Hemoglobin A1c     Medications Discontinued During This Encounter   Medication Reason     LORazepam (ATIVAN) 1 MG tablet Reorder       Return in about 3 months (around 8/2/2018).    CHIEF COMPLAINT:  Chief Complaint   Patient presents with     Follow-up     [facility], pt states everything is going well        HISTORY OF PRESENT ILLNESS:  Lupe is a 78 y.o. female presenting to the clinic today to follow-up after ED admission for COPD.     COPD: She could not breathe well on Sunday. On Tuesday she went to the ED and was told she had pneumonia and a collapsed lung. She is feeling well today, and has only a headache. She felt well yesterday as well. She has just a bit of thick white phlegm, though not  as much as she once had. She reports her breathing is wonderful. She took medications Wednesday at the hospital, and noted a difference by Friday, when she baked and cleaned the house. She uses an at-home nebulizer. She did not use it before admittance to the ED, but has been using it since.    Diabetes: She has been taking 4 metformin at a time. She reports no diarrhea. She took 2 prednisone in the morning. She was dizzy, nervous, and had an upset stomach with prednisone. Prednisone has so impacted her in the past as well. The last day of her regimen of doxycycline was Saturday.    Edema: She reports some swelling in the extremities bilaterally. She takes a water pill daily.     REVIEW OF SYSTEMS:   She had 13 shots in her left eye last night. At her last appointment, she was told the pressure in her right eye was 14 and the pressure in her left eye was 12. She states that brain matter behind her eye is almost closed. She is making herself drink a lot of fluids.     All other systems are negative.    PFSH:  Pertinent history reviewed.     TOBACCO USE:  History   Smoking Status     Former Smoker     Quit date: 6/19/1960   Smokeless Tobacco     Never Used       VITALS:  Vitals:    05/02/18 1444   BP: 124/70   Patient Site: Left Arm   Patient Position: Sitting   Cuff Size: Adult Regular   Pulse: 88   SpO2: 96%   Weight: 192 lb 8 oz (87.3 kg)     Wt Readings from Last 3 Encounters:   05/02/18 192 lb 8 oz (87.3 kg)   04/25/18 179 lb (81.2 kg)   03/02/18 192 lb 9.6 oz (87.4 kg)     Body mass index is 35.21 kg/(m^2).    PHYSICAL EXAM:  Constitutional:  Reveals an alert, talkative woman.  Vitals:  Per nursing notes.  Cardiac:  Regular rate and rhythm without murmurs, rubs, or gallops. Carotids without bruits. Legs without edema. Palpation of the radial pulse regular.  Lungs: Clear.  Respiratory effort normal.  Skin:   Without rash, bruise, or palpable lesions.  Neurologic:  Cranial nerves II-XII intact.     Psychiatric:   Mood appropriate, memory intact.     QUALITY MEASURES:    ADDITIONAL HISTORY SUMMARIZED (2):Reviewed ED note from 04/25/2018, shortness of breath  DECISION TO OBTAIN EXTRA INFORMATION (1): None  RADIOLOGY TESTS (1): Reviewed Xray from 04/25/2018, xray results.   LABS (1): Ordered, reviewed labs, A1c  MEDICINE TESTS (1): None  INDEPENDENT REVIEW (2 each): Reviewed xray from 04/25/2018,  Compressed lung    Total data points: 6    The visit lasted a total of 26 minutes face to face with the patient. Over 50% of the time was spent counseling and educating the patient about COPD.    IBatsheva, am scribing for and in the presence of, Dr. Wooten.    I, Dr. Wooten, personally performed the services described in this documentation, as scribed by Batsheva Boateng in my presence, and it is both accurate and complete.    MEDICATIONS:  Current Outpatient Prescriptions   Medication Sig Dispense Refill     ACCU-CHEK OLY PLUS TEST STRP Strp TEST THREE TIMES DAILY 300 strip 13     albuterol (PROAIR HFA) 90 mcg/actuation inhaler Inhale 2 puffs every 6 (six) hours as needed for wheezing. 1 Inhaler 3     allopurinol (ZYLOPRIM) 300 MG tablet Take 1 tablet (300 mg total) by mouth daily. 90 tablet 3     aspirin 81 MG EC tablet Take 1 tablet (81 mg total) by mouth daily.  0     atorvastatin (LIPITOR) 10 MG tablet Take 1 tablet (10 mg total) by mouth daily. 90 tablet 3     buPROPion (WELLBUTRIN SR) 100 MG 12 hr tablet Take 1 tablet (100 mg total) by mouth Daily at 8:00 am.. 90 tablet 3     calcium citrate (CALCITRATE) 200 mg (950 mg) tablet Take 1 tablet by mouth 2 (two) times a day.       cholecalciferol, vitamin D3, 5,000 unit Tab Take 1 tablet by mouth daily.       cyanocobalamin (VITAMIN B-12) 500 MCG tablet Take 500 mcg by mouth daily.       diltiazem (DILACOR XR) 240 MG 24 hr capsule Take 1 capsule (240 mg total) by mouth daily. 90 capsule 3     docusate sodium (COLACE) 50 MG capsule Take 100 mg by mouth daily as needed  for constipation.       doxycycline (MONODOX) 100 MG capsule Take 1 capsule (100 mg total) by mouth 2 (two) times a day for 10 days. 20 capsule 0     ferrous sulfate 325 (65 FE) MG tablet Take 1 tablet by mouth daily with breakfast.       FLUoxetine (PROZAC) 20 MG capsule Take 2 capsules (40 mg total) by mouth daily. 180 capsule 3     fluticasone-salmeterol (ADVAIR) 500-50 mcg/dose DISKUS Inhale 1 puff 2 (two) times a day.       furosemide (LASIX) 40 MG tablet Take 1 tablet (40 mg total) by mouth daily. 90 tablet 3     HYDROcodone-acetaminophen 5-325 mg per tablet Take 1 tablet by mouth every 6 (six) hours as needed for pain. 60 tablet 0     ipratropium-albuterol (DUO-NEB) 0.5-2.5 mg/3 mL nebulizer 3 mL every 6 (six) hours as needed.       LORazepam (ATIVAN) 1 MG tablet Take 1 tablet (1 mg total) by mouth every 8 (eight) hours as needed for anxiety. 30 tablet 5     metFORMIN (GLUCOPHAGE) 500 MG tablet Take 2 tablets (1,000 mg total) by mouth 2 (two) times a day. 360 tablet 3     naproxen (NAPROSYN) 500 MG tablet TAKE 1 TABLET(500 MG) BY MOUTH DAILY 90 tablet 0     nortriptyline (PAMELOR) 25 MG capsule Take 1 capsule (25 mg total) by mouth at bedtime as needed. 90 capsule 3     OMEGA-3/DHA/EPA/FISH OIL (FISH OIL-OMEGA-3 FATTY ACIDS) 300-1,000 mg capsule Take 1 g by mouth daily.        omeprazole (PRILOSEC) 20 MG capsule TAKE 1 CAPSULE(20 MG) BY MOUTH TWICE DAILY 180 capsule 3     pioglitazone (ACTOS) 30 MG tablet Take 1 tablet (30 mg total) by mouth daily. 90 tablet 3     potassium chloride SA (K-DUR,KLOR-CON) 20 MEQ tablet TAKE 1 TABLET(20 MEQ) BY MOUTH DAILY 90 tablet 2     predniSONE (DELTASONE) 20 MG tablet Take 1 tablet (20 mg total) by mouth daily for 7 days. 7 tablet 0     sitaGLIPtin (JANUVIA) 100 MG tablet Take 1 tablet (100 mg total) by mouth daily. 90 tablet 3     vitamin E 400 unit capsule Take 400 Units by mouth daily.       No current facility-administered medications for this visit.

## 2021-06-17 NOTE — TELEPHONE ENCOUNTER
Refill Approved    Rx renewed per Medication Renewal Policy. Medication was last renewed on 3/20/20.    Ruben Brito, [facility] Triage/Med Refill 5/24/2021     Requested Prescriptions   Pending Prescriptions Disp Refills     furosemide (LASIX) 40 MG tablet [Pharmacy Med Name: FUROSEMIDE 40MG TABLETS] 90 tablet 3     Sig: TAKE 1 TABLET(40 MG) BY MOUTH DAILY       Diuretics/Combination Diuretics Refill Protocol  Passed - 5/22/2021  9:17 PM        Passed - Visit with PCP or prescribing provider visit in past 12 months     Last office visit with prescriber/PCP: 11/20/2020 Dar Wooten MD OR same dept: Visit date not found OR same specialty: 11/20/2020 Dar Wooten MD  Last physical: 9/14/2017 Last MTM visit: Visit date not found   Next visit within 3 mo: Visit date not found  Next physical within 3 mo: Visit date not found  Prescriber OR PCP: Dar Wooten MD  Last diagnosis associated with med order: 1. Lymphedema of left lower extremity  - furosemide (LASIX) 40 MG tablet [Pharmacy Med Name: FUROSEMIDE 40MG TABLETS]; TAKE 1 TABLET(40 MG) BY MOUTH DAILY  Dispense: 90 tablet; Refill: 3    If protocol passes may refill for 12 months if within 3 months of last provider visit (or a total of 15 months).             Passed - Serum Potassium in past 12 months      Lab Results   Component Value Date    Potassium 4.6 11/20/2020             Passed - Serum Sodium in past 12 months      Lab Results   Component Value Date    Sodium 143 11/20/2020             Passed - Blood pressure on file in past 12 months     BP Readings from Last 1 Encounters:   11/20/20 128/78             Passed - Serum Creatinine in past 12 months      Creatinine   Date Value Ref Range Status   11/20/2020 1.44 (H) 0.60 - 1.10 mg/dL Final

## 2021-06-17 NOTE — TELEPHONE ENCOUNTER
Telephone Encounter by Dena Carbajal LPN at 2/2/2021  4:24 PM     Author: Dena Carbajal LPN Service: -- Author Type: Licensed Nurse    Filed: 2/2/2021  4:33 PM Encounter Date: 2/2/2021 Status: Signed    : Dena Carbajal LPN (Licensed Nurse)       Patient misplaced her medication.  She is requesting asap as she feels she might be going into a full blown attack. She is hoping Dr. Wooten will fill today.          Medication:  Lorazepam 1 mg  Last Date Filled 3/2/20   pulled: YES          Only PCP Prescribing? : YES  Taken as prescribed from physician notes? YES    CSA in last year: NO  Random Utox in last year: NO  Opioids + benzodiazepines? YES  Is patient on the Executive Review Team?No    Last visit with Dr. Wooten was 11/20/20      All responses suggest: Refilling prescription

## 2021-06-17 NOTE — TELEPHONE ENCOUNTER
Telephone Encounter by Antonietta Briceno at 2/5/2021  4:39 PM     Author: Antonietta Briceno Service: -- Author Type: --    Filed: 2/5/2021  4:39 PM Encounter Date: 2/5/2021 Status: Signed    : Antonietta Briceno APPROVED:    Approval start date: 1/6/2021  Approval end date:  2/5/2022    Pharmacy has been notified of approval and will contact patient when medication is ready for pickup.

## 2021-06-18 ENCOUNTER — RECORDS - HEALTHEAST (OUTPATIENT)
Dept: ADMINISTRATIVE | Facility: CLINIC | Age: 81
End: 2021-06-18

## 2021-06-18 NOTE — LETTER
Letter by Dar Wooten MD at      Author: Dar Wooten MD Service: -- Author Type: --    Filed:  Encounter Date: 12/31/2018 Status: (Other)       Lupe Hill  [address] [place] [address]             December 31, 2018         Dear Ms. Hill,    Below are the results from your recent visit:    Resulted Orders   Glycosylated Hemoglobin A1c   Result Value Ref Range    Hemoglobin A1c 6.3 (H) 3.5 - 6.0 %   Basic Metabolic Panel   Result Value Ref Range    Sodium 141 136 - 145 mmol/L    Potassium 4.0 3.5 - 5.0 mmol/L    Chloride 105 98 - 107 mmol/L    CO2 26 22 - 31 mmol/L    Anion Gap, Calculation 10 5 - 18 mmol/L    Glucose 123 70 - 125 mg/dL    Calcium 9.4 8.5 - 10.5 mg/dL    BUN 24 8 - 28 mg/dL    Creatinine 1.17 (H) 0.60 - 1.10 mg/dL    GFR MDRD Af Amer 54 (L) >60 mL/min/1.73m2    GFR MDRD Non Af Amer 45 (L) >60 mL/min/1.73m2    Narrative    Fasting Glucose reference range is 70-99 mg/dL per  American Diabetes Association (ADA) guidelines.   Drugs of Abuse 1,Urine   Result Value Ref Range    Amphetamines Screen Negative Screen Negative    Benzodiazepines Screen Negative Screen Negative    Opiates Screen Negative Screen Negative    Phencyclidine Screen Negative Screen Negative    THC Screen Negative Screen Negative    Barbiturates Screen Negative Screen Negative    Cocaine Metabolite Screen Negative Screen Negative    Oxycodone Screen Negative Screen Negative    Creatinine, Urine 10.9 mg/dL      Comment:      Urine very dilute; suggest recollection.    Narrative    Drug                  Screening Threshold    Amphetamines           1000 ng/mL  Benzodiazepine          200 ng/mL  Opiates                 300 ng/mL  Phencyclidine            25 ng/mL  THC Metabolite           50 ng/mL  Barbiturates            200 ng/mL  Cocaine Metabolite      150 ng/mL  Oxycodone               100 ng/mL    Screening results are to be used only for medical purposes.  Unconfirmed screening results must not be used  for non-  medical purposes.       Your blood tests look good and your urine sample was normal.  Your diabetes is under excellent control    Please call with questions or contact us using MyChart.    Sincerely,        Electronically signed by Dar Wooten MD

## 2021-06-18 NOTE — PROGRESS NOTES
ASSESSMENT:  1. Acute diastolic heart failure (H)  Volume stable.  Question role of pioglitazone.  Continue daily furosemide.  Pioglitazone discontinued and confirmed    2. Anxiety  Refill meds.  Severe anxiety for 6 hours after discharge from the hospital discussed but has not recurred  - FLUoxetine (PROZAC) 20 MG capsule; Take 2 capsules (40 mg total) by mouth daily.  Dispense: 180 capsule; Refill: 3  - buPROPion (WELLBUTRIN SR) 100 MG 12 hr tablet; Take 1 tablet (100 mg total) by mouth Daily at 8:00 am..  Dispense: 90 tablet; Refill: 3    3. Gastroesophageal reflux disease with esophagitis  Stable  - omeprazole (PRILOSEC) 20 MG capsule; TAKE 1 CAPSULE(20 MG) BY MOUTH TWICE DAILY  Dispense: 180 capsule; Refill: 3    4. CAD of autologous bypass graft  Stable.  Much improved with nitroglycerin    5. Anemia due to chronic illness  Monitor  - HM2(CBC w/o Differential)    6. Essential hypertension with goal blood pressure less than 140/90  Blood pressure stable with addition of nitroglycerin  - Basic Metabolic Panel    7. Type 2 diabetes mellitus with complication, without long-term current use of insulin (H)  Reviewed metformin and addition of glipizide.  Reviewed cessation of pioglitazone    8.  Bronchitis.  Resolved.  Discontinue antibiotics    PLAN:  Patient Instructions   Stop Prednisone and Doxycycline and save for next time    Stay off the Pioglitazone.  It may have been causing some of the swelling    If the swelling gets a lot better, you may not need all the Furosemide    Repeat the abnormal blood tests          Orders Placed This Encounter   Procedures     Basic Metabolic Panel     HM2(CBC w/o Differential)     Medications Discontinued During This Encounter   Medication Reason     FLUoxetine (PROZAC) 20 MG capsule Reorder     buPROPion (WELLBUTRIN SR) 100 MG 12 hr tablet Reorder     omeprazole (PRILOSEC) 20 MG capsule Reorder       Return in about 4 weeks (around 7/11/2018).    CHIEF COMPLAINT:  Chief  Complaint   Patient presents with     Follow-up     From [facility] 6/5-6/8       HISTORY OF PRESENT ILLNESS:  Lupe is a 78 y.o. female presenting to the clinic today for a hospital follow up. She was in [facility] from 6/5/2018 to 6/8/2018 with complaints of chest pain and trouble breathing. Her symptoms improved with heparin and nitroglycerin, but they are not entirely sure what happened. Her breathing is great and she no longer has chest pain now. She is taking isosorbide once daily and tolerates it well.     Diabetes: Her blood sugars have been in a fairly good range since getting out of the hospital. They are a little low in the morning but they increase throughout the day. She is currently taking metformin and glipizide. She discontinued pioglitazone when she was in the hospital. She has only had one episodes of hypoglycemia since getting home. Her last hemoglobin A1c was 7.8% on 5/2/2018.     COPD: Her breathing was bad when she arrived at the hospital, but it is much improved now. She is still taking doxycycline and prednisone. She states her breathing is the best it has been in a long time.     Anxiety: She was very anxious when she first got home from the hospital and did not want to be left alone for about six hours. She had to take one lorazepam. She has felt much better since. She takes bupropion for her anxiety.     CHF: The swelling in her lower legs has been improving and her weight has been stable. She takes furosemide 40 mg every day.     Depression: She takes Prozac for depression and states her mood has been good.     REVIEW OF SYSTEMS:   She denies lightheadedness. Her back feels okay. She denies headaches. She takes a daily aspirin. All other systems are negative.    PFSH:  Reviewed, as below.     TOBACCO USE:  History   Smoking Status     Former Smoker     Quit date: 6/19/1960   Smokeless Tobacco     Never Used       VITALS:  Vitals:    06/13/18 1430   BP: 126/76   Patient Site: Left  Arm   Patient Position: Sitting   Cuff Size: Adult Large   Pulse: 74   Weight: 190 lb 12.8 oz (86.5 kg)     Wt Readings from Last 3 Encounters:   06/13/18 190 lb 12.8 oz (86.5 kg)   06/08/18 196 lb 4.8 oz (89 kg)   05/02/18 192 lb 8 oz (87.3 kg)     Body mass index is 34.9 kg/(m^2).    PHYSICAL EXAM:  Constitutional:  Reveals an alert, pleasant, talkative woman. Ambulates with a cane.  Vitals:  Per nursing notes.  Cardiac:  Regular rate and rhythm without murmurs, rubs, or gallops. Legs without edema. Palpation of the radial pulse regular.  Psychiatric:  Mood appropriate, memory intact.     MEDICATIONS:  Current Outpatient Prescriptions   Medication Sig Dispense Refill     ACCU-CHEK OLY PLUS TEST STRP Strp TEST THREE TIMES DAILY 300 strip 13     albuterol (PROAIR HFA) 90 mcg/actuation inhaler Inhale 2 puffs every 6 (six) hours as needed for wheezing. 1 Inhaler 3     allopurinol (ZYLOPRIM) 300 MG tablet Take 1 tablet (300 mg total) by mouth daily. 90 tablet 3     aspirin 81 MG EC tablet Take 1 tablet (81 mg total) by mouth daily.  0     atorvastatin (LIPITOR) 10 MG tablet Take 1 tablet (10 mg total) by mouth daily. 90 tablet 3     buPROPion (WELLBUTRIN SR) 100 MG 12 hr tablet Take 1 tablet (100 mg total) by mouth Daily at 8:00 am.. 90 tablet 3     calcium citrate (CALCITRATE) 200 mg (950 mg) tablet Take 1 tablet by mouth 2 (two) times a day.       cholecalciferol, vitamin D3, 5,000 unit Tab Take 1 tablet by mouth daily.       cyanocobalamin (VITAMIN B-12) 500 MCG tablet Take 500 mcg by mouth daily.       diltiazem (DILACOR XR) 240 MG 24 hr capsule Take 1 capsule (240 mg total) by mouth daily. 90 capsule 3     docusate sodium (COLACE) 50 MG capsule Take 100 mg by mouth daily as needed for constipation.       doxycycline (MONODOX) 100 MG capsule Take 1 capsule (100 mg total) by mouth 2 (two) times a day for 10 days. 20 capsule 0     ferrous sulfate 325 (65 FE) MG tablet Take 1 tablet by mouth daily with breakfast.        FLUoxetine (PROZAC) 20 MG capsule Take 2 capsules (40 mg total) by mouth daily. 180 capsule 3     fluticasone-salmeterol (ADVAIR) 500-50 mcg/dose DISKUS Inhale 1 puff 2 (two) times a day.       furosemide (LASIX) 40 MG tablet Take 1 tablet (40 mg total) by mouth daily. 90 tablet 3     glipiZIDE (GLUCOTROL) 5 MG tablet Take 1 tablet (5 mg total) by mouth daily. 30 tablet 0     ipratropium-albuterol (DUO-NEB) 0.5-2.5 mg/3 mL nebulizer 3 mL every 6 (six) hours as needed.       isosorbide mononitrate (IMDUR) 30 MG 24 hr tablet Take 1 tablet (30 mg total) by mouth daily. 30 tablet 11     LORazepam (ATIVAN) 1 MG tablet Take 1 tablet (1 mg total) by mouth every 8 (eight) hours as needed for anxiety. 30 tablet 5     metFORMIN (GLUCOPHAGE) 500 MG tablet Take 2 tablets (1,000 mg total) by mouth 2 (two) times a day. 360 tablet 3     naproxen (NAPROSYN) 500 MG tablet TAKE 1 TABLET(500 MG) BY MOUTH DAILY 90 tablet 0     nitroglycerin (NITROSTAT) 0.4 MG SL tablet Place 0.4 mg under the tongue every 5 (five) minutes as needed for chest pain.       nortriptyline (PAMELOR) 25 MG capsule Take 1 capsule (25 mg total) by mouth at bedtime as needed. 90 capsule 3     OMEGA-3/DHA/EPA/FISH OIL (FISH OIL-OMEGA-3 FATTY ACIDS) 300-1,000 mg capsule Take 1 g by mouth daily.        omeprazole (PRILOSEC) 20 MG capsule TAKE 1 CAPSULE(20 MG) BY MOUTH TWICE DAILY 180 capsule 3     potassium chloride SA (K-DUR,KLOR-CON) 20 MEQ tablet TAKE 1 TABLET(20 MEQ) BY MOUTH DAILY 90 tablet 2     predniSONE (DELTASONE) 20 MG tablet Take 1 tablet (20 mg total) by mouth daily with breakfast for 7 days. 7 tablet 0     sitaGLIPtin (JANUVIA) 100 MG tablet Take 1 tablet (100 mg total) by mouth daily. 90 tablet 3     vitamin E 400 unit capsule Take 400 Units by mouth daily.       No current facility-administered medications for this visit.        ADDITIONAL HISTORY SUMMARIZED (2): Reviewed 6/5-6/8 hospital discharge summary regarding chest pain and COPD  exacerbation.   DECISION TO OBTAIN EXTRA INFORMATION (1): None.   RADIOLOGY TESTS (1): None.  LABS (1): Labs from 6/5-6/8 reviewed. Labs ordered.   MEDICINE TESTS (1): None.  INDEPENDENT REVIEW (2 each): None.     The visit lasted a total of 15 minutes face to face with the patient. Over 50% of the time was spent counseling and educating the patient about her recent hospitalization.    I, Teodoro Ibarra, am scribing for and in the presence of, Dr. Wooten.    I, Dr. Wooten, personally performed the services described in this documentation, as scribed by Teodoro Ibarra in my presence, and it is both accurate and complete.    Total data points: 3

## 2021-06-19 ENCOUNTER — HEALTH MAINTENANCE LETTER (OUTPATIENT)
Age: 81
End: 2021-06-19

## 2021-06-19 NOTE — PROGRESS NOTES
MTM Follow Up Encounter  Assessment & Plan                                                     1. Diarrhea  Largely improved on lower dose of fluoxetine, which may have been the culprit.  She largely tends towards constipation, therefore it is likely that this was related to medication changes.  Recommend to continue current regimen.    2. Anxiety  Greatly improved, although she did not tolerate higher doses of bupropion and fluoxetine, simply by changing her dose timing, and moving her fluoxetine dosing all to morning, she is feeling significantly better.  Both depression and anxiety are better, ideally this will help prevent panic attacks in the future as well as ER visits.  Recommend continue current regimen.    3. Type 2 diabetes mellitus with stage 3 chronic kidney disease, without long-term current use of insulin (H)  At goal A1c less than 8% per ADA guidelines, on max dose metformin, and getting free Januvia from the .  Stable on aspirin, statin, no ARB or ACE inhibitor at this time, in January 2017 she did have evidence of microalbuminuria.  Recommend reassess microalbumin, and consider initiation of ACE inhibitor or ARB pending results.  -Recommend to reassess microalbumin   -Consider ACE inhibitor or ARB pending microalbumin result    4. Elevated uric acid in blood  Uric acid level is very low, under 5, this has seemed to help over time with her chronic arthritis.  Recommend continue current dose of allopurinol.  In the future may consider titrating down to 100 mg daily and reassess as long as her uric acid level is below 5.  -Consider decreasing allopurinol to 100 mg daily     5. Chronic obstructive pulmonary disease with acute exacerbation (H)  Stable, with infrequent use of short acting beta agonist.  Provide education that if she does have excessive phlegm she could use her DuoNeb to help dry up phlegm, also if needed for excessive shortness of breath, to prevent ER visit . Recommend  "continue current regimen.    6. Routine health maintenance  Normal vitamin D level, recommend to continue.  Discussed the benefits of goal calcium daily of 1200 mg, between diet and supplements.  Encouraged her to obtain calcium citrate as this will be better absorbed while she is taking omeprazole.  Also encouraged her to have her DEXA scan completed.  -Initiate calcium citrate    Follow Up  Return in about 1 month (around 9/16/2018).    Subjective & Objective                                                       Lupe Hill is a 78 y.o. female coming in for a follow up visit for Medication Therapy Management. She was referred to me from Dar Wooten MD    Chief Complaint: Medication follow-up    Medication Adherence/Access: She takes 3 pills at a time, every 20 minutes, if she takes more she will have stomach issues.     Diarrhea: continues to have diarrhea, however today is better, only 1 BM since waking at 630am. Normally she tends towards constipation. If she is worried that she may have diarrhea, she will eat unsweetened applesauce before she leaves the house.  Her diarrhea started on August 3, after dose increases in fluoxetine and bupropion.  As discussed below, she had adjusted these doses down and changed the timing of when she takes them, and had great improvement in her symptoms.    Anxiety/depression: she feels that now her mood is perfect, she just needed to figure out how to dose her tabs during the day. Prior to this she had many side effects. She is comfortable with this dosing schedule. She is caught up on her tasks and feeling good. She has had depression long term. Recent ER visit at the end of July, was told she had anxiety attack. This has also greatly improved, no longer crying, she is \"excited when the phone rings.\" Discussed med changes, when she was on higher doses of bupropion and fluoxetine she had significant side effects, however when she returned to previous dosing, and " adjusted her dose timing, she is feeling significantly better. The main change is taking all of fluoxetine in the morning. She is needing less lorazepam. She is sleeping well.     Diabetes: has been checking her blood sugars and have been well controlled. Now back on Januvia which had previously been very well controlled.  This is free from the .  Lab Results   Component Value Date    HGBA1C 7.8 (H) 05/02/2018    HGBA1C 8.6 (H) 03/02/2018    HGBA1C 7.3 (H) 10/24/2017     Lab Results   Component Value Date    MICROALBUR 2.80 (H) 01/26/2017    LDLCALC 77 02/02/2016    CREATININE 0.91 07/29/2018     Elevated uric acid:  Has never had a gout flare, but due to elevated uric acid was started on allopurinol, this has helped with her chronic pains.         Lab Results   Component Value Date     URICACID 2.9 08/17/2017     COPD: In general her breathing is well, however had a recent ER visit where she actually was having a panic attack.  When she was so short of breath she had very poor efficacy through her albuterol inhaler.  She did not think to use her nebulizer.  In general she uses her nebulizer about twice weekly.    Health maintenance: She does have about one serving of cheese daily, however did not remember to initiate calcium supplementation as discussed at last visit.  She is due for a DXA scan.    Vitamin D, Total (25-Hydroxy)   Date Value Ref Range Status   07/10/2017 38.7 30.0 - 80.0 ng/mL Final     PMH: reviewed in EPIC   Allergies/ADRs: reviewed in EPIC   Alcohol: reviewed in EPIC   Tobacco:   History   Smoking Status     Former Smoker     Quit date: 6/19/1960   Smokeless Tobacco     Never Used     Today's Vitals:   Vitals:    08/16/18 1112   BP: 127/54   Pulse: 87   Weight: 187 lb 12.8 oz (85.2 kg)     ----------------    Much or all of the text in this note was generated through the use of Dragon Dictate voice-to-text software. Errors in spelling or words which seem out of context are  unintentional. Sound alike errors, in particular, may have escaped editing.    The patient was given a summary of these recommendations as an after visit summary    I spent 30 minutes with this patient today;   All changes were made via collaborative practice agreement with Dar Wooten MD. A copy of the visit note was provided to the patient's provider.     Sebas Moncada, PharmD, BCACP  Medication Management (MTM) Pharmacist  [facility]         Current Outpatient Prescriptions   Medication Sig Dispense Refill     albuterol (PROAIR HFA) 90 mcg/actuation inhaler Inhale 2 puffs every 6 (six) hours as needed for wheezing. 1 Inhaler 3     allopurinol (ZYLOPRIM) 300 MG tablet TAKE 1 TABLET(300 MG) BY MOUTH DAILY 90 tablet 3     aspirin 81 MG EC tablet Take 1 tablet (81 mg total) by mouth daily.  0     atorvastatin (LIPITOR) 10 MG tablet Take 1 tablet (10 mg total) by mouth daily. 90 tablet 3     blood glucose test (ACCU-CHEK OLY PLUS TEST STRP) strips TEST THREE TIMES DAILY 300 strip 3     buPROPion (WELLBUTRIN SR) 100 MG 12 hr tablet Take 1 tablet (100 mg total) by mouth daily. 90 tablet 3     calcium citrate (CALCITRATE) 200 mg (950 mg) tablet Take 1 tablet by mouth 2 (two) times a day.       cholecalciferol, vitamin D3, 5,000 unit Tab Take 1 tablet by mouth daily.       cyanocobalamin (VITAMIN B-12) 500 MCG tablet Take 500 mcg by mouth daily.       diltiazem (DILACOR XR) 240 MG 24 hr capsule Take 1 capsule (240 mg total) by mouth daily. 90 capsule 3     docusate sodium (COLACE) 50 MG capsule Take 100 mg by mouth daily as needed for constipation.       ferrous sulfate 325 (65 FE) MG tablet Take 1 tablet by mouth daily with breakfast.       FLUoxetine (PROZAC) 20 MG capsule Take 1 capsule (20 mg total) by mouth 2 (two) times a day. At 7:10am and 1030am 180 capsule 3     fluticasone-salmeterol (ADVAIR DISKUS) 500-50 mcg/dose DISKUS Inhale 1 puff 2 (two) times a day. 3 each 3     furosemide (LASIX)  40 MG tablet Take 1 tablet (40 mg total) by mouth daily. 90 tablet 3     generic lancets (FINGERSTIX LANCETS) Use 1 each As Directed 3 (three) times a day. Dispense brand per patient's insurance at pharmacy discretion. 300 each 3     glipiZIDE (GLUCOTROL) 5 MG tablet Take 1 tablet (5 mg total) by mouth daily. 90 tablet 3     ipratropium-albuterol (DUO-NEB) 0.5-2.5 mg/3 mL nebulizer Inhale 3 mL every 6 (six) hours as needed.        isosorbide mononitrate (IMDUR) 30 MG 24 hr tablet Take 1 tablet (30 mg total) by mouth daily. 90 tablet 3     LORazepam (ATIVAN) 1 MG tablet TAKE 1 TABLET(1 MG) BY MOUTH TWICE DAILY AS NEEDED FOR ANXIETY 20 tablet 0     metFORMIN (GLUCOPHAGE) 500 MG tablet Take 2 tablets (1,000 mg total) by mouth 2 (two) times a day. 360 tablet 3     naproxen (NAPROSYN) 500 MG tablet TAKE 1 TABLET(500 MG) BY MOUTH DAILY 90 tablet 3     nitroglycerin (NITROSTAT) 0.4 MG SL tablet Place 0.4 mg under the tongue every 5 (five) minutes as needed for chest pain.       nortriptyline (PAMELOR) 25 MG capsule Take 1 capsule (25 mg total) by mouth at bedtime as needed. 90 capsule 3     OMEGA-3/DHA/EPA/FISH OIL (FISH OIL-OMEGA-3 FATTY ACIDS) 300-1,000 mg capsule Take 1 g by mouth daily.        omeprazole (PRILOSEC) 20 MG capsule TAKE 1 CAPSULE(20 MG) BY MOUTH TWICE DAILY 180 capsule 3     potassium chloride SA (K-DUR,KLOR-CON) 20 MEQ tablet TAKE 1 TABLET(20 MEQ) BY MOUTH DAILY 90 tablet 2     sitaGLIPtin (JANUVIA) 100 MG tablet Take 100 mg by mouth daily. From patient assistance program. Do not refill.       vitamin E 400 unit capsule Take 400 Units by mouth daily.       No current facility-administered medications for this visit.

## 2021-06-19 NOTE — LETTER
Letter by Dar Wooten MD at      Author: Dar Wooten MD Service: -- Author Type: --    Filed:  Encounter Date: 6/12/2019 Status: (Other)         Lupe Hill  [address] [place] [address]             June 12, 2019         Dear Ms. Hill,    Below are the results from your recent visit:    Resulted Orders   HM2(CBC w/o Differential)   Result Value Ref Range    WBC 7.9 4.0 - 11.0 thou/uL    RBC 4.18 3.80 - 5.40 mill/uL    Hemoglobin 12.6 12.0 - 16.0 g/dL    Hematocrit 38.4 35.0 - 47.0 %    MCV 92 80 - 100 fL    MCH 30.1 27.0 - 34.0 pg    MCHC 32.8 32.0 - 36.0 g/dL    RDW 12.5 11.0 - 14.5 %    Platelets 270 140 - 440 thou/uL    MPV 6.9 (L) 7.0 - 10.0 fL   Glycosylated Hemoglobin A1c   Result Value Ref Range    Hemoglobin A1c 6.0 3.5 - 6.0 %   Basic Metabolic Panel   Result Value Ref Range    Sodium 144 136 - 145 mmol/L    Potassium 4.1 3.5 - 5.0 mmol/L    Chloride 106 98 - 107 mmol/L    CO2 28 22 - 31 mmol/L    Anion Gap, Calculation 10 5 - 18 mmol/L    Glucose 72 70 - 125 mg/dL    Calcium 9.6 8.5 - 10.5 mg/dL    BUN 22 8 - 28 mg/dL    Creatinine 1.15 (H) 0.60 - 1.10 mg/dL    GFR MDRD Af Amer 55 (L) >60 mL/min/1.73m2    GFR MDRD Non Af Amer 45 (L) >60 mL/min/1.73m2    Narrative    Fasting Glucose reference range is 70-99 mg/dL per  American Diabetes Association (ADA) guidelines.       Your blood tests all look great and your diabetes is very well controlled    Please call with questions or contact us using [employer].    Sincerely,        Electronically signed by Dar Wooten MD

## 2021-06-19 NOTE — PROGRESS NOTES
ASSESSMENT:  1. Anxiety  Worsened over the last year.  Unclear etiology.  Reviewed initiation of bupropion in 2013.  Reviewed conversion of peroxide teen to fluoxetine.  Predominantly anxiety but some depression.  Will push dosage of both.  Allow lorazepam to be used as needed    2. BMI 35.0-35.9,adult  With ongoing diabetes.  Continue encouragement to diet and exercise    3. Essential hypertension with goal blood pressure less than 140/90  Stable    4. Type 2 diabetes mellitus with stage 3 chronic kidney disease, without long-term current use of insulin (H)  Stable.  Under control with medications.  No insulin    5. Anxiety  As above  - FLUoxetine (PROZAC) 20 MG capsule; Take 3 capsules (60 mg total) by mouth daily.  Dispense: 270 capsule; Refill: 3  - buPROPion (WELLBUTRIN SR) 100 MG 12 hr tablet; Take 1 tablet (100 mg total) by mouth 2 (two) times a day.  Dispense: 90 tablet; Refill: 3    6.  Abdominal pain.  With ER visit.  Reviewed CT scan, hypernephroma, and labs.  Reasonable to assume this related to abdominal pain and constipation but remain vigilant    PLAN:  Patient Instructions   White blood cell count and CT scan and everything else looks good    It is reasonable that the constipation and discomfort caused all your symptoms    Your anxiety is worse    Increase Bupropion to 100 mgs twice daily for better mood    Increase the Fluoxetine to 1 capsule in the morning, and continue 2 in the evening    Continue Lorazepam as needed    Everything else stays the same    Come back in 3 weeks to re-asesss this      No orders of the defined types were placed in this encounter.    Medications Discontinued During This Encounter   Medication Reason     naproxen (NAPROSYN) 500 MG tablet Reorder     sitaGLIPtin (JANUVIA) 100 MG tablet Reorder     FLUoxetine (PROZAC) 20 MG capsule Reorder     buPROPion (WELLBUTRIN SR) 100 MG 12 hr tablet Reorder       Return in about 3 weeks (around 8/21/2018).    All patient medications  were reconciled.     CHIEF COMPLAINT:  Chief Complaint   Patient presents with     Follow-up      [place] 7/29       HISTORY OF PRESENT ILLNESS:  Lupe is a 78 y.o. female presenting to the clinic today for an ED follow up. She presented to [facility] ED on 7/29 for hyperventilation and abdominal pain. She recalls being told that her white blood cell count was elevated, but chart review shows that it was in the normal range. She was told that they wanted to keep her overnight but would allow her to go home if she thought she would be okay, so she left. She attributed the hyperventilation to anxiety and thinks she was more anxious because of the severe constipation and abdominal pain.     Abdominal Pain: She had right-sided abdominal pain when she went to the ED. She notes that she had not had a bowel movement in a week prior to having five the morning of 7/29 before going to the ED. She had a CT of the abdomen that showed a slight increase in size of her left kidney mass and a non obstructing left kidney stone, but these were not thought to be contributing to her symptoms. They are not sure what was causing the pain, but she thinks it must have been the constipation. Her abdominal pain eventually resolved last night.     Anxiety/Depression: Her mood has not been as good in the past three months. She acknowledges she may have some depression but believes she is much more anxious than depressed. She has been taking lorazepam twice daily lately. She continues to take fluoxetine 40 mg daily before bed and bupropion 100 mg daily in the morning and has never tried taking a higher dose of either of these medications. She states her mood is worse than it was a few years ago.     REVIEW OF SYSTEMS:   She had fallen the week prior to going to the ED and scraped her knees. She typically only sleeps from 11 PM to 4 AM but states that she sleeps well. She generally takes a nap in the afternoon. All other systems are  negative.    PFSH:  Reviewed, as below.     TOBACCO USE:  History   Smoking Status     Former Smoker     Quit date: 6/19/1960   Smokeless Tobacco     Never Used       VITALS:  Vitals:    07/31/18 1052   BP: 124/78   Patient Site: Left Arm   Patient Position: Sitting   Cuff Size: Adult Large   Pulse: (!) 102   SpO2: 94%   Weight: 195 lb 9.6 oz (88.7 kg)     Wt Readings from Last 3 Encounters:   07/31/18 195 lb 9.6 oz (88.7 kg)   07/29/18 195 lb (88.5 kg)   07/13/18 195 lb 5 oz (88.6 kg)     Body mass index is 35.78 kg/(m^2).    PHYSICAL EXAM:  Constitutional:  Reveals an alert, talkative, somewhat anxious woman. Ambulates with two single pronged canes.  Vitals:  Per nursing notes.   Psychiatric:  Mood appropriate, memory intact.     MEDICATIONS:  Current Outpatient Prescriptions   Medication Sig Dispense Refill     albuterol (PROAIR HFA) 90 mcg/actuation inhaler Inhale 2 puffs every 6 (six) hours as needed for wheezing. 1 Inhaler 3     aspirin 81 MG EC tablet Take 1 tablet (81 mg total) by mouth daily.  0     atorvastatin (LIPITOR) 10 MG tablet Take 1 tablet (10 mg total) by mouth daily. 90 tablet 3     blood glucose test (ACCU-CHEK OLY PLUS TEST STRP) strips TEST THREE TIMES DAILY 300 strip 3     buPROPion (WELLBUTRIN SR) 100 MG 12 hr tablet Take 1 tablet (100 mg total) by mouth 2 (two) times a day. 90 tablet 3     calcium citrate (CALCITRATE) 200 mg (950 mg) tablet Take 1 tablet by mouth 2 (two) times a day.       cholecalciferol, vitamin D3, 5,000 unit Tab Take 1 tablet by mouth daily.       cyanocobalamin (VITAMIN B-12) 500 MCG tablet Take 500 mcg by mouth daily.       diltiazem (DILACOR XR) 240 MG 24 hr capsule Take 1 capsule (240 mg total) by mouth daily. 90 capsule 3     docusate sodium (COLACE) 50 MG capsule Take 100 mg by mouth daily as needed for constipation.       ferrous sulfate 325 (65 FE) MG tablet Take 1 tablet by mouth daily with breakfast.       FLUoxetine (PROZAC) 20 MG capsule Take 3 capsules  (60 mg total) by mouth daily. 270 capsule 3     fluticasone-salmeterol (ADVAIR DISKUS) 500-50 mcg/dose DISKUS Inhale 1 puff 2 (two) times a day. 3 each 3     furosemide (LASIX) 40 MG tablet Take 1 tablet (40 mg total) by mouth daily. 90 tablet 3     generic lancets (FINGERSTIX LANCETS) Use 1 each As Directed 3 (three) times a day. Dispense brand per patient's insurance at pharmacy discretion. 300 each 3     glipiZIDE (GLUCOTROL) 5 MG tablet Take 1 tablet (5 mg total) by mouth daily. 90 tablet 3     ipratropium-albuterol (DUO-NEB) 0.5-2.5 mg/3 mL nebulizer Inhale 3 mL every 6 (six) hours as needed.        isosorbide mononitrate (IMDUR) 30 MG 24 hr tablet Take 1 tablet (30 mg total) by mouth daily. 90 tablet 3     LORazepam (ATIVAN) 1 MG tablet TAKE 1 TABLET(1 MG) BY MOUTH TWICE DAILY AS NEEDED FOR ANXIETY 20 tablet 0     metFORMIN (GLUCOPHAGE) 500 MG tablet Take 2 tablets (1,000 mg total) by mouth 2 (two) times a day. 360 tablet 3     naproxen (NAPROSYN) 500 MG tablet TAKE 1 TABLET(500 MG) BY MOUTH DAILY 90 tablet 3     nitroglycerin (NITROSTAT) 0.4 MG SL tablet Place 0.4 mg under the tongue every 5 (five) minutes as needed for chest pain.       nortriptyline (PAMELOR) 25 MG capsule Take 1 capsule (25 mg total) by mouth at bedtime as needed. 90 capsule 3     OMEGA-3/DHA/EPA/FISH OIL (FISH OIL-OMEGA-3 FATTY ACIDS) 300-1,000 mg capsule Take 1 g by mouth daily.        omeprazole (PRILOSEC) 20 MG capsule TAKE 1 CAPSULE(20 MG) BY MOUTH TWICE DAILY 180 capsule 3     potassium chloride SA (K-DUR,KLOR-CON) 20 MEQ tablet TAKE 1 TABLET(20 MEQ) BY MOUTH DAILY 90 tablet 2     sitaGLIPtin (JANUVIA) 100 MG tablet Take 1 tablet (100 mg total) by mouth daily. 90 tablet 3     vitamin E 400 unit capsule Take 400 Units by mouth daily.       allopurinol (ZYLOPRIM) 300 MG tablet Take 1 tablet (300 mg total) by mouth daily. 90 tablet 3     No current facility-administered medications for this visit.        ADDITIONAL HISTORY SUMMARIZED  (2): Reviewed 7/29 ED note regarding abdominal pain  DECISION TO OBTAIN EXTRA INFORMATION (1): None.   RADIOLOGY TESTS (1): Reviewed 7/29 CT abdomen  LABS (1): Labs from 7/29 reviewed - WBC count normal.   MEDICINE TESTS (1): None.  INDEPENDENT REVIEW (2 each): None.     The visit lasted a total of 22 minutes face to face with the patient. Over 50% of the time was spent counseling and educating the patient about her recent ED visit for abdominal pain.    ITeodoro, am scribing for and in the presence of, Dr. Wooten.    I, Dr. Wooten, personally performed the services described in this documentation, as scribed by Teodoro Ibarra in my presence, and it is both accurate and complete.    Total data points: 4

## 2021-06-19 NOTE — PROGRESS NOTES
ASSESSMENT:  1. Type 2 diabetes mellitus with complication, without long-term current use of insulin (H)  Stable.  No hypoglycemia.  Off pioglitazone  - glipiZIDE (GLUCOTROL) 5 MG tablet; Take 1 tablet (5 mg total) by mouth daily.  Dispense: 90 tablet; Refill: 3    2. Moderate persistent asthma without complication  Stable.  Refill meds  - fluticasone-salmeterol (ADVAIR DISKUS) 500-50 mcg/dose DISKUS; Inhale 1 puff 2 (two) times a day.  Dispense: 3 each; Refill: 3    3. Anxiety  Stable.  Takes lorazepam every few days    4. Essential hypertension with goal blood pressure less than 140/90  Stable without side effects  - isosorbide mononitrate (IMDUR) 30 MG 24 hr tablet; Take 1 tablet (30 mg total) by mouth daily.  Dispense: 90 tablet; Refill: 3        PLAN:  Patient Instructions   Refill glipizide and isosorbide for 90 day prescriptions    Refill advair        No orders of the defined types were placed in this encounter.    Medications Discontinued During This Encounter   Medication Reason     ADVAIR DISKUS 500-50 mcg/dose DISKUS Reorder     isosorbide mononitrate (IMDUR) 30 MG 24 hr tablet Reorder     glipiZIDE (GLUCOTROL) 5 MG tablet Reorder     fluticasone-salmeterol (ADVAIR) 500-50 mcg/dose DISKUS Duplicate order       Return in about 2 months (around 9/13/2018).      CHIEF COMPLAINT:  Chief Complaint   Patient presents with     Follow-up       HISTORY OF PRESENT ILLNESS:  Lupe is a 78 y.o. female presenting to the clinic today to follow up on her anxiety, diabetes, hypertension, and asthma.     Anxiety: Her mood has been good since she was last seen. She generally takes lorazepam 2-4 times per month for her anxiety. She has had to take it a little more often with the hot weather.     Diabetes: Her diabetes has been under good control lately. Her last hemoglobin A1c was 7.8% on 5/2/2018. She has not had any episodes of hypoglycemia. She discontinued pioglitazone after her last appointment and her lower extremity  edema resolved as a result.     Hypertension: Her blood pressure has been in a good range since she was last here. Her blood pressure has not been too low, and she has not been lightheaded. She has not had any headaches from the isosorbide.     Asthma: She has some trouble breathing with the high heat and humidity. She has been spending a lot of time indoors as a result. She continues to use her Advair inhaler regularly.     REVIEW OF SYSTEMS:   She has not had any acid reflux symptoms. She does not know if she is taking allopurinol or not, but she does not think she is. She has not had any diarrhea. She denies chest pain. She is done with prednisone and antibiotics and her bronchitis symptoms seem to be removed. All other systems are negative.    PFSH:  Reviewed, as below.     TOBACCO USE:  History   Smoking Status     Former Smoker     Quit date: 6/19/1960   Smokeless Tobacco     Never Used       VITALS:  Vitals:    07/13/18 1410   BP: 134/72   Patient Site: Left Arm   Patient Position: Sitting   Cuff Size: Adult Regular   Pulse: 86   SpO2: 97%   Weight: 195 lb 5 oz (88.6 kg)     Wt Readings from Last 3 Encounters:   07/13/18 195 lb 5 oz (88.6 kg)   06/13/18 190 lb 12.8 oz (86.5 kg)   06/08/18 196 lb 4.8 oz (89 kg)     Body mass index is 35.72 kg/(m^2).    PHYSICAL EXAM:  Constitutional:  Reveals an alert, pleasant, talkative woman.  Vitals:  Per nursing notes.  Cardiac:  Regular rate and rhythm without murmurs, rubs, or gallops. Trace lower extremity edema. Palpation of the radial pulse regular.  Lungs: Clear.  Respiratory effort normal.  Psychiatric:  Mood appropriate, memory intact.     MEDICATIONS:  Current Outpatient Prescriptions   Medication Sig Dispense Refill     albuterol (PROAIR HFA) 90 mcg/actuation inhaler Inhale 2 puffs every 6 (six) hours as needed for wheezing. 1 Inhaler 3     aspirin 81 MG EC tablet Take 1 tablet (81 mg total) by mouth daily.  0     atorvastatin (LIPITOR) 10 MG tablet Take 1  tablet (10 mg total) by mouth daily. 90 tablet 3     blood glucose test (ACCU-CHEK OLY PLUS TEST STRP) strips TEST THREE TIMES DAILY 300 strip 3     buPROPion (WELLBUTRIN SR) 100 MG 12 hr tablet Take 1 tablet (100 mg total) by mouth Daily at 8:00 am.. 90 tablet 3     calcium citrate (CALCITRATE) 200 mg (950 mg) tablet Take 1 tablet by mouth 2 (two) times a day.       cholecalciferol, vitamin D3, 5,000 unit Tab Take 1 tablet by mouth daily.       cyanocobalamin (VITAMIN B-12) 500 MCG tablet Take 500 mcg by mouth daily.       diltiazem (DILACOR XR) 240 MG 24 hr capsule Take 1 capsule (240 mg total) by mouth daily. 90 capsule 3     docusate sodium (COLACE) 50 MG capsule Take 100 mg by mouth daily as needed for constipation.       ferrous sulfate 325 (65 FE) MG tablet Take 1 tablet by mouth daily with breakfast.       FLUoxetine (PROZAC) 20 MG capsule Take 2 capsules (40 mg total) by mouth daily. 180 capsule 3     furosemide (LASIX) 40 MG tablet Take 1 tablet (40 mg total) by mouth daily. 90 tablet 3     generic lancets (FINGERSTIX LANCETS) Use 1 each As Directed 3 (three) times a day. Dispense brand per patient's insurance at pharmacy discretion. 300 each 3     ipratropium-albuterol (DUO-NEB) 0.5-2.5 mg/3 mL nebulizer 3 mL every 6 (six) hours as needed.       LORazepam (ATIVAN) 1 MG tablet Take 1 tablet (1 mg total) by mouth every 8 (eight) hours as needed for anxiety. 30 tablet 5     LORazepam (ATIVAN) 1 MG tablet TAKE 1 TABLET(1 MG) BY MOUTH TWICE DAILY AS NEEDED FOR ANXIETY 20 tablet 0     metFORMIN (GLUCOPHAGE) 500 MG tablet Take 2 tablets (1,000 mg total) by mouth 2 (two) times a day. 360 tablet 3     naproxen (NAPROSYN) 500 MG tablet TAKE 1 TABLET(500 MG) BY MOUTH DAILY 90 tablet 0     nitroglycerin (NITROSTAT) 0.4 MG SL tablet Place 0.4 mg under the tongue every 5 (five) minutes as needed for chest pain.       nortriptyline (PAMELOR) 25 MG capsule Take 1 capsule (25 mg total) by mouth at bedtime as needed. 90  capsule 3     OMEGA-3/DHA/EPA/FISH OIL (FISH OIL-OMEGA-3 FATTY ACIDS) 300-1,000 mg capsule Take 1 g by mouth daily.        omeprazole (PRILOSEC) 20 MG capsule TAKE 1 CAPSULE(20 MG) BY MOUTH TWICE DAILY 180 capsule 3     potassium chloride SA (K-DUR,KLOR-CON) 20 MEQ tablet TAKE 1 TABLET(20 MEQ) BY MOUTH DAILY 90 tablet 2     sitaGLIPtin (JANUVIA) 100 MG tablet Take 1 tablet (100 mg total) by mouth daily. 90 tablet 3     vitamin E 400 unit capsule Take 400 Units by mouth daily.       allopurinol (ZYLOPRIM) 300 MG tablet Take 1 tablet (300 mg total) by mouth daily. 90 tablet 3     fluticasone-salmeterol (ADVAIR DISKUS) 500-50 mcg/dose DISKUS Inhale 1 puff 2 (two) times a day. 3 each 3     glipiZIDE (GLUCOTROL) 5 MG tablet Take 1 tablet (5 mg total) by mouth daily. 90 tablet 3     isosorbide mononitrate (IMDUR) 30 MG 24 hr tablet Take 1 tablet (30 mg total) by mouth daily. 90 tablet 3     No current facility-administered medications for this visit.        ADDITIONAL HISTORY SUMMARIZED (2): Reviewed 6/5 - 6/8 hospital note regarding asthma, daibetes, and CHF.   DECISION TO OBTAIN EXTRA INFORMATION (1): None.   RADIOLOGY TESTS (1): None.  LABS (1): Labs from 6/13/2018 reviewed - improved from previous  MEDICINE TESTS (1): None.  INDEPENDENT REVIEW (2 each): None.     The visit lasted a total of 10 minutes face to face with the patient. Over 50% of the time was spent counseling and educating the patient about her asthma.    ITeodoro, am scribing for and in the presence of, Dr. Wooten.    I, Dr. Wooten, personally performed the services described in this documentation, as scribed by Teodoro Ibarra in my presence, and it is both accurate and complete.    Total data points: 3

## 2021-06-19 NOTE — PROGRESS NOTES
"ASSESSMENT:  1. Anxiety  Side effects on increased Prozac and bupropion.  Reviewed Pharm.D. note and phone calls.  Now better but taking 2 mg of lorazepam daily.    Unclear why she would have suddenly worsened.  Use of benzodiazepines suggests underlying issue not well controlled    Cautious trial of low-dose Abilify    2. Essential hypertension with goal blood pressure less than 140/90  Stable.  Check microalbumin to determine whether ACE inhibitor needed    3. Type 2 diabetes mellitus with stage 3 chronic kidney disease, without long-term current use of insulin (H)  Stable.  No hypoglycemia  - Microalbumin, Random Urine; Future    4.  Obesity.  Consider trial of topiramate.  Improved after insulin discontinued      PLAN:  Patient Instructions   For anxiety add Abilify 5 mgs, one half pill each morning    Continue everything else the same          Orders Placed This Encounter   Procedures     Microalbumin, Random Urine     Standing Status:   Future     Standing Expiration Date:   8/16/2019     There are no discontinued medications.    Return in about 4 weeks (around 9/13/2018) for and see Hannah, our pharmacist, in 2 weeks.      CHIEF COMPLAINT:  Chief Complaint   Patient presents with     Follow-up       HISTORY OF PRESENT ILLNESS:  Lupe is a 78 y.o. female presenting to the clinic today to follow up on her anxiety.     Anxiety/Depression: She complained of worsened anxiety the last time she was here on 7/31 so her dose of bupropion was increased from 100 mg once daily to twice daily and her dose of fluoxetine was increased from just 40 mg in the evening to 20 mg in the morning and 40 mg in the evening. She did not tolerate these dose changes at all. She started to feel angry, sad, and unlike herself within a day or two of taking the increased doses. She felt empty and \"outside of herself.\" She then started to develop diarrhea a few days later and decided to go back down to her old doses. She also moved the timing " "of her fluoxetine to taking all 40 mg in the morning and has been tolerating it much better this way. Her mood is \"perfect\" now. She is taking one lorazepam at 7 AM and another at 10 AM. She has never had to take lorazepam twice daily in the past before, and she is not sure why she is more anxious now. She thinks she has been anxious for most of her life. She likes where she is at right now and does not want to make any changes, but she also acknowledges that her anxiety is not properly controlled if she is taking a medication like lorazepam twice daily.     REVIEW OF SYSTEMS:   She is not having any pain. She has lost some weight. She recently had her eyes checked and got new glasses. All other systems are negative.    PFSH:  Reviewed, as below.     TOBACCO USE:  History   Smoking Status     Former Smoker     Quit date: 6/19/1960   Smokeless Tobacco     Never Used       VITALS:  There were no vitals filed for this visit.  Wt Readings from Last 3 Encounters:   08/16/18 187 lb 12.8 oz (85.2 kg)   07/31/18 195 lb 9.6 oz (88.7 kg)   07/29/18 195 lb (88.5 kg)     There is no height or weight on file to calculate BMI.    PHYSICAL EXAM:  Constitutional:  Reveals an alert, pleasant, talkative woman. Appears somewhat fidgety and speaks quickly.  Vitals:  Per nursing notes.  Cardiac:  Regular rate and rhythm without murmurs, rubs, or gallops. Palpation of the radial pulse regular.  Psychiatric:  Mood appropriate, memory intact.       MEDICATIONS:  Current Outpatient Prescriptions   Medication Sig Dispense Refill     albuterol (PROAIR HFA) 90 mcg/actuation inhaler Inhale 2 puffs every 6 (six) hours as needed for wheezing. 1 Inhaler 3     allopurinol (ZYLOPRIM) 300 MG tablet TAKE 1 TABLET(300 MG) BY MOUTH DAILY 90 tablet 3     aspirin 81 MG EC tablet Take 1 tablet (81 mg total) by mouth daily.  0     atorvastatin (LIPITOR) 10 MG tablet Take 1 tablet (10 mg total) by mouth daily. 90 tablet 3     blood glucose test (ACCU-CHEK " OLY PLUS TEST STRP) strips TEST THREE TIMES DAILY 300 strip 3     buPROPion (WELLBUTRIN SR) 100 MG 12 hr tablet Take 1 tablet (100 mg total) by mouth daily. 90 tablet 3     calcium citrate (CALCITRATE) 200 mg (950 mg) tablet Take 1 tablet by mouth 2 (two) times a day.       cholecalciferol, vitamin D3, 5,000 unit Tab Take 1 tablet by mouth daily.       cyanocobalamin (VITAMIN B-12) 500 MCG tablet Take 500 mcg by mouth daily.       diltiazem (DILACOR XR) 240 MG 24 hr capsule Take 1 capsule (240 mg total) by mouth daily. 90 capsule 3     docusate sodium (COLACE) 50 MG capsule Take 100 mg by mouth daily as needed for constipation.       ferrous sulfate 325 (65 FE) MG tablet Take 1 tablet by mouth daily with breakfast.       FLUoxetine (PROZAC) 20 MG capsule Take 1 capsule (20 mg total) by mouth 2 (two) times a day. At 7:10am and 1030am 180 capsule 3     fluticasone-salmeterol (ADVAIR DISKUS) 500-50 mcg/dose DISKUS Inhale 1 puff 2 (two) times a day. 3 each 3     furosemide (LASIX) 40 MG tablet Take 1 tablet (40 mg total) by mouth daily. 90 tablet 3     generic lancets (FINGERSTIX LANCETS) Use 1 each As Directed 3 (three) times a day. Dispense brand per patient's insurance at pharmacy discretion. 300 each 3     glipiZIDE (GLUCOTROL) 5 MG tablet Take 1 tablet (5 mg total) by mouth daily. 90 tablet 3     ipratropium-albuterol (DUO-NEB) 0.5-2.5 mg/3 mL nebulizer Inhale 3 mL every 6 (six) hours as needed.        isosorbide mononitrate (IMDUR) 30 MG 24 hr tablet Take 1 tablet (30 mg total) by mouth daily. 90 tablet 3     LORazepam (ATIVAN) 1 MG tablet TAKE 1 TABLET(1 MG) BY MOUTH TWICE DAILY AS NEEDED FOR ANXIETY 20 tablet 0     metFORMIN (GLUCOPHAGE) 500 MG tablet Take 2 tablets (1,000 mg total) by mouth 2 (two) times a day. 360 tablet 3     naproxen (NAPROSYN) 500 MG tablet TAKE 1 TABLET(500 MG) BY MOUTH DAILY 90 tablet 3     nitroglycerin (NITROSTAT) 0.4 MG SL tablet Place 0.4 mg under the tongue every 5 (five) minutes  as needed for chest pain.       nortriptyline (PAMELOR) 25 MG capsule Take 1 capsule (25 mg total) by mouth at bedtime as needed. 90 capsule 3     OMEGA-3/DHA/EPA/FISH OIL (FISH OIL-OMEGA-3 FATTY ACIDS) 300-1,000 mg capsule Take 1 g by mouth daily.        omeprazole (PRILOSEC) 20 MG capsule TAKE 1 CAPSULE(20 MG) BY MOUTH TWICE DAILY 180 capsule 3     potassium chloride SA (K-DUR,KLOR-CON) 20 MEQ tablet TAKE 1 TABLET(20 MEQ) BY MOUTH DAILY 90 tablet 2     sitaGLIPtin (JANUVIA) 100 MG tablet Take 100 mg by mouth daily. From patient assistance program. Do not refill.       vitamin E 400 unit capsule Take 400 Units by mouth daily.       ARIPiprazole (ABILIFY) 5 MG tablet Take 0.5 tablets (2.5 mg total) by mouth daily. 15 tablet 11     No current facility-administered medications for this visit.        ADDITIONAL HISTORY SUMMARIZED (2): Reviewed Hannah Moncada, PharmD note from earlier today regarding medications and anxiety.   DECISION TO OBTAIN EXTRA INFORMATION (1): None.   RADIOLOGY TESTS (1): None.  LABS (1): Labs from 7/29/2018 reviewed. Labs ordered.   MEDICINE TESTS (1): None.  INDEPENDENT REVIEW (2 each): None.     The visit lasted a total of 14 minutes face to face with the patient. Over 50% of the time was spent counseling and educating the patient about her anxiety.    ITeodoro, am scribing for and in the presence of, Dr. Wooten.    I, Dr. Wooten, personally performed the services described in this documentation, as scribed by Teodoro Ibarra in my presence, and it is both accurate and complete.    Total data points: 3

## 2021-06-19 NOTE — LETTER
Letter by Dar Wooten MD at      Author: Dar Wooten MD Service: -- Author Type: --    Filed:  Encounter Date: 3/27/2019 Status: (Other)         Lupe Hill  [address] [place] [address]             March 27, 2019         Dear Ms. Hill,    Below are the results from your recent visit:    Resulted Orders   Comprehensive Metabolic Panel   Result Value Ref Range    Sodium 146 (H) 136 - 145 mmol/L    Potassium 4.3 3.5 - 5.0 mmol/L    Chloride 110 (H) 98 - 107 mmol/L    CO2 23 22 - 31 mmol/L    Anion Gap, Calculation 13 5 - 18 mmol/L    Glucose 62 (L) 70 - 125 mg/dL    BUN 27 8 - 28 mg/dL    Creatinine 1.24 (H) 0.60 - 1.10 mg/dL    GFR MDRD Af Amer 50 (L) >60 mL/min/1.73m2    GFR MDRD Non Af Amer 42 (L) >60 mL/min/1.73m2    Bilirubin, Total 0.3 0.0 - 1.0 mg/dL    Calcium 8.6 8.5 - 10.5 mg/dL    Protein, Total 5.7 (L) 6.0 - 8.0 g/dL    Albumin 3.8 3.5 - 5.0 g/dL    Alkaline Phosphatase 86 45 - 120 U/L    AST 9 0 - 40 U/L    ALT 9 0 - 45 U/L    Narrative    Fasting Glucose reference range is 70-99 mg/dL per  American Diabetes Association (ADA) guidelines.   Glycosylated Hemoglobin A1c   Result Value Ref Range    Hemoglobin A1c 5.5 3.5 - 6.0 %   Ferritin   Result Value Ref Range    Ferritin 35 10 - 130 ng/mL   Vitamin B12   Result Value Ref Range    Vitamin B-12 >2,000 (H) 213 - 816 pg/mL   Erythrocyte Sedimentation Rate   Result Value Ref Range    Sed Rate 7 0 - 20 mm/hr   Iron and Transferrin Iron Binding Capacity   Result Value Ref Range    Iron 31 (L) 42 - 175 ug/dL    Transferrin 219 212 - 360 mg/dL    Transferrin Saturation, Calculated 11 (L) 20 - 50 %    Transferrin IBC, Calculated 274 (L) 313 - 563 ug/dL   HM2(CBC w/o Differential)   Result Value Ref Range    WBC 8.8 4.0 - 11.0 thou/uL    RBC 4.22 3.80 - 5.40 mill/uL    Hemoglobin 12.7 12.0 - 16.0 g/dL    Hematocrit 38.8 35.0 - 47.0 %    MCV 92 80 - 100 fL    MCH 30.1 27.0 - 34.0 pg    MCHC 32.8 32.0 - 36.0 g/dL    RDW 12.8 11.0 - 14.5 %     Platelets 282 140 - 440 thou/uL    MPV 7.4 7.0 - 10.0 fL       Your iron level is slightly low, but I would still like you to stay off the iron    All of your other blood tests look good    Your diabetes is very well controlled    Please call with questions or contact us using [employer].    Sincerely,        Electronically signed by Dar Wooten MD

## 2021-06-20 NOTE — LETTER
Letter by Dar Wooten MD at      Author: Dar Wooten MD Service: -- Author Type: --    Filed:  Encounter Date: 1/24/2020 Status: (Other)         Lupe Hill  [address] [place] [address]             January 24, 2020         Dear Ms. Hill,    Below are the results from your recent visit:    Resulted Orders   Glycosylated Hemoglobin A1c   Result Value Ref Range    Hemoglobin A1c 6.0 3.5 - 6.0 %   Drug Abuse 1+, Urine   Result Value Ref Range    Amphetamines Screen Negative Screen Negative    Benzodiazepines Screen Negative Screen Negative    Opiates Screen Negative Screen Negative    Phencyclidine Screen Negative Screen Negative    THC Screen Negative Screen Negative    Barbiturates Screen Negative Screen Negative    Cocaine Metabolite Screen Negative Screen Negative    Methadone Screen Negative Screen Negative    Oxycodone Screen Negative Screen Negative    Creatinine, Urine 143.5 mg/dL    Narrative    Drug                           Screening Threshold    Amphetamines                    1000 ng/mL  Benzodiazepine                   200 ng/mL  Opiates                          300 ng/mL  Phencyclidine                     25 ng/mL  THC Metabolite                    50 ng/mL  Barbiturates                     200 ng/mL  Cocaine Metabolite               150 ng/mL  Methadone                        300 ng/mL  Oxycodone                        100 ng/mL    Screening results are to be used only for medical purposes.  Unconfirmed screening results are not to be used for non-  medical purposes.   Comprehensive Metabolic Panel   Result Value Ref Range    Sodium 144 136 - 145 mmol/L    Potassium 3.9 3.5 - 5.0 mmol/L    Chloride 105 98 - 107 mmol/L    CO2 29 22 - 31 mmol/L    Anion Gap, Calculation 10 5 - 18 mmol/L    Glucose 87 70 - 125 mg/dL    BUN 32 (H) 8 - 28 mg/dL    Creatinine 1.80 (H) 0.60 - 1.10 mg/dL    GFR MDRD Af Amer 33 (L) >60 mL/min/1.73m2    GFR MDRD Non Af Amer 27 (L) >60 mL/min/1.73m2     Bilirubin, Total 0.3 0.0 - 1.0 mg/dL    Calcium 9.2 8.5 - 10.5 mg/dL    Protein, Total 5.9 (L) 6.0 - 8.0 g/dL    Albumin 3.7 3.5 - 5.0 g/dL    Alkaline Phosphatase 105 45 - 120 U/L    AST 11 0 - 40 U/L    ALT 10 0 - 45 U/L    Narrative    Fasting Glucose reference range is 70-99 mg/dL per  American Diabetes Association (ADA) guidelines.   Uric Acid   Result Value Ref Range    Uric Acid 5.0 2.0 - 7.5 mg/dL   Lipid Cascade   Result Value Ref Range    Cholesterol 180 <=199 mg/dL    Triglycerides 307 (H) <=149 mg/dL    HDL Cholesterol 50 >=50 mg/dL    LDL Calculated 69 <=129 mg/dL    Patient Fasting > 8hrs? No        Your diabetes is very well controlled.  I discussed this with Hannah and we recommend that you discontinue the Januvia completely    Your kidney blood tests are too dry, taking 2 of the furosemide 40 mg tablets daily.  Please decrease down to 1 of the furosemide 40 mg tablets daily    Please call with questions or contact us using [employer].    Sincerely,        Electronically signed by Dar Wooten MD

## 2021-06-20 NOTE — PROGRESS NOTES
"  Office Visit - Follow Up   Lupe Hill   78 y.o. female    Date of Visit: 10/5/2018    Chief Complaint   Patient presents with     Cough     x 2 days        Assessment and Plan   1. Mucopurulent chronic bronchitis (H)  Onset 48 hours ago of mild increase cough 24 hours ago she thought her cough was getting worse. She describes and she had some \" panic breathing\".  She's been wheezing a bit denies any fever. O2 sats today at 92% lung sounds clear to my auscultation. She's had good success with doxycycline in the past. This was ordered today. She's been overusing her nebulizers. Encouraged her to use her duo neb every four hours or less.            No Follow-up on file.     History of Present Illness   This 78 y.o. old  Patient of Dr. Wooten  With the long-standing COPD. She reports that 48 hours ago she developed a cough that initially was mild. Her cough is become worse and more productive. She's bringing about grey-white phlegm in small amounts. She denies any fevers. Reports don't chills. She says she's wheezing a bit more. She has nebulizers at home,  Using duo neb .  And she gets more short of breath she's been using it more often. She is using it now every four hours or less. I cautioned her that she should not use this more than every four hours. She's on chronic Advair at the maximum dose and uses this appropriately at  twice per day.    Review of Systems: A comprehensive review of systems was negative except as noted.     Medications, Allergies and Problem List   Reviewed, reconciled and updated     Physical Exam   General Appearance:       /60 (Patient Site: Right Arm, Patient Position: Sitting)  Pulse 99  Temp 98.5  F (36.9  C)  Ht 5' 2\" (1.575 m)  Wt 199 lb (90.3 kg)  SpO2 92%  BMI 36.4 kg/m2     Her breath sounds sound clear. There's just a scant amount of wheeze in the left upper posterior chest with the breathing.  Her respirations are comfortable. O2 sats are 92%.     Additional " Information   No current facility-administered medications for this visit.      No current outpatient prescriptions on file.     Facility-Administered Medications Ordered in Other Visits   Medication Dose Route Frequency Provider Last Rate Last Dose     albuterol inhaler 2 puff (PROAIR HFA;PROVENTIL HFA;VENTOLIN HFA)  2 puff Inhalation Q6H PRN Valentino Sneed MD         allopurinol tablet 100 mg (ZYLOPRIM)  100 mg Oral DAILY Valentino Sneed MD         ARIPiprazole tablet 2.5 mg (ABILIFY)  2.5 mg Oral DAILY Valentino Sneed MD         aspirin EC tablet 81 mg  81 mg Oral DAILY Valentino Sneed MD         atorvastatin tablet 10 mg (LIPITOR)  10 mg Oral DAILY Valentino Sneed MD         cefTRIAXone 1 g in NaCl 0.9 % 50 mL (MINI-BAG Plus) (ROCEPHIN)  1 g Intravenous Q24H Valentino Sneed MD        And     azithromycin tablet 500 mg (ZITHROMAX)  500 mg Oral DAILY Valentino Sneed MD         buPROPion 12 hr tablet 100 mg (WELLBUTRIN SR)  100 mg Oral DAILY Valentino Sneed MD         calcium citrate tablet 200 mg (CALCITRATE)  1 tablet Oral BID Valentino Sneed MD   200 mg at 10/05/18 2033     cholecalciferol (vitamin D3) Tab 5,000 Units  5,000 Units Oral DAILY Valentino Sneed MD         cyanocobalamin tablet 500 mcg  500 mcg Oral DAILY Valentino Sneed MD         dextrose 50 % (D50W) syringe 20-50 mL  20-50 mL Intravenous PRN Valentino Sneed MD         diltiazem 24 hr capsule 240 mg (DILACOR XR)  240 mg Oral DAILY Valentino Sneed MD         docusate sodium capsule 100 mg (COLACE)  100 mg Oral Daily PRN Valentino Sneed MD         ferrous sulfate tablet 325 mg  1 tablet Oral Daily with brkfst Vlaentino Sneed MD         FLUoxetine capsule 20 mg (PROzac)  20 mg Oral BID Valentino Sneed MD   20 mg at 10/05/18 2032     fluticasone-salmeterol 500-50 mcg/dose diskus inhaler 1 puff (ADVAIR)  1 puff Inhalation BID Valentino Sneed MD   1  puff at 10/05/18 2033     furosemide injection 40 mg (LASIX)  40 mg Intravenous BID - diuretic Valentino Sneed MD   40 mg at 10/05/18 1939     glipiZIDE tablet 5 mg (GLUCOTROL)  5 mg Oral Daily AC - diabetic Valentino Sneed MD         glucagon (human recombinant) injection 1 mg  1 mg Subcutaneous PRN Valentino Sneed MD         heparin (PF) subcutaneous injection 5,000 Units  5,000 Units Subcutaneous Q12H 09-21 Valentino Sneed MD   5,000 Units at 10/05/18 2032     HYDROcodone-acetaminophen 5-325 mg per tablet 1 tablet  1 tablet Oral Q4H PRN Valentino Sneed MD   1 tablet at 10/06/18 0309     isosorbide mononitrate 24 hr tablet 30 mg (IMDUR)  30 mg Oral DAILY Valentino Sneed MD         LORazepam tablet 1 mg (ATIVAN)  1 mg Oral BID PRN Valetnino Sneed MD   1 mg at 10/05/18 2032     metFORMIN tablet 1,000 mg (GLUCOPHAGE)  1,000 mg Oral BID with meals Valentino Sneed MD         naloxone injection 0.2-0.4 mg (NARCAN)  0.2-0.4 mg Intravenous PRN Valentino Sneed MD        Or     naloxone injection 0.2-0.4 mg (NARCAN)  0.2-0.4 mg Intramuscular PRN Valentino Sneed MD         naproxen tablet 500 mg (NAPROSYN)  500 mg Oral BID PRN Valentino Sneed MD         nitroglycerin SL tablet 0.4 mg (NITROSTAT)  0.4 mg Sublingual Q5 Min PRN Valentino Sneed MD         nortriptyline capsule 25 mg (PAMELOR)  25 mg Oral Bedtime PRN Valentino Sneed MD         omega 3-dha-epa-fish oil capsule 1 capsule (FISH OIL)  500 mg Oral DAILY Valentino Sneed MD         omeprazole capsule 20 mg (PriLOSEC)  20 mg Oral BID AC Valentino Sneed MD         potassium chloride CR tablet 20 mEq (K-DUR,KLOR-CON)  20 mEq Oral BID Valentino Sneed MD   20 mEq at 10/05/18 2039     sitaGLIPtin tablet 100 mg (JANUVIA)  100 mg Oral DAILY Valentino Sneed MD         sodium chloride 0.9 % with KCl 20 mEq/L infusion  25 mL/hr Intravenous Continuous Valentino Sneed MD 25  mL/hr at 10/05/18 2033 25 mL/hr at 10/05/18 2033     vitamin E capsule 400 Units  400 Units Oral DAILY Valentino Sneed MD         Allergies   Allergen Reactions     Insulin Aspart      Subcutaneous insulin inj in 10/2013 led to abdominal wall cellulitis requiring debridement     Monosodium Glutamate      Social History   Substance Use Topics     Smoking status: Former Smoker     Quit date: 6/19/1960     Smokeless tobacco: Never Used     Alcohol use No       Review and/or order of clinical lab tests:  Review and/or order of radiology tests:  Review and/or order of medicine tests:  Discussion of test results with performing physician:  Decision to obtain old records and/or obtain history from someone other than the patient:  Review and summarization of old records and/or obtaining history from someone other than the patient and.or discussion of case with another health care provider:  Independent visualization of image, tracing or specimen itself:    Time: total time spent with the patient was 15 minutes of which >50% was spent in counseling and coordination of care     Dar Porter MD

## 2021-06-20 NOTE — PROGRESS NOTES
12:42 PM - RECEIVED O2 INTAKE AT 12:26 PM. CALLED SAID TO HAVE DOCTOR SIGN ORDER AND GET F2F NOTES FROM PHYSICIAN. SAID HAD LEFT FOR THE DAY SPOKE WITH NEW RT WHO WAS AWARE OF SITUATION. HE SAID HE WILL GIVE THE MESSAGE TO THE DOCTOR.  12:57 PM - DEB CARRASCO - CALLED PATIENT TO OFFER CHOICE AND SHE ACCEPTED [facility] AS HER O2 PROVIDER. WANTED TO GO WITH THE FILL STATION BECAUSE SHE USED A WALKER OR A CANE. REQUESTED TWO C TANKS. WILL NEED TO DELIVER PORTABILITY TO BEDSIDE.  1:40 AND 1:45 PM - CALLED RT DEPARTMENT TO CHECK STATUS OF PHYSICIAN NOTES. NO ANSWER FIRST TIME, YODIT RT ANSWERED THE PHONE SECOND TIME. TOLD HER WE WERE STILL WAITING FOR F2F NOTES. SHE CONFIRMED EXACTLY WHAT WAS NEEDED IN THE F2F NOTES AND SAID SHE WILL CONTACT DOCTOR TO COMPLETE.  2:08 PM - DEB CARRASCO - NOTICED THERE WAS A NEW NOTE IN EPIC WITH AN EXPECTED DISCHARGE DATE OF 10/11. CALLED NURSE TO VERIFY IF PATIENT WAS BEING DISCHARGED TODAY OR TOMORROW. SHE SAID SHE HAS NO ORDERS TO DISCHARGE AND CONFIRMED THE EXPECTED DATE TO BE 10/11.  2:21 PM - DEB CARRASCO - CALLED SAID TO CONFIRM DISCHARGE IS NOT TODAY. SAID DID CONFIRM DISCHARGE IS FOR TOMORROW 10/11. MADE NOTE TO FOLLOW UP TOMORROW.  10/11/2018 - 10:23AM - DEB CARRASCO - CHECKED IN PATIENTS CHART THIS MORING AND IT LOOKS LIKE SHE IS DISCHARGING TODAY. CALLED NURSING STATION TO CONFIRM. NURSE DID CONFIRM DISCHARGE AND IS PENDING ON WHEN SHE RECEIVES HER TRANSPORT TANKS.  WILL DELIVER TO [facility] AND HOME SET UP WILL BE DONE THIS AFTERNOON.  10:39 AM - DEB CARRASCO - CALLED NURSING STATION TO CONFIRM NEED FOR O2 DUE TO CHF WILL NEED TO BE STATED IN PHYSICIAN NOTES. SPOKE WITH MARITA AND TOLD HER WHAT WAS NEEDING IN THE NOTE. SHE WILL HAVE THE DOCTOR ADD IT IN THE DISCHARGE NOTE.

## 2021-06-20 NOTE — LETTER
Letter by Dar Wooten MD at      Author: Dar Wooten MD Service: -- Author Type: --    Filed:  Encounter Date: 1/23/2020 Status: (Other)         [facility] INTERNAL MEDICINE  01/23/20    Patient: Lupe Hill  YOB: 1940  Medical Record Number: [id number]  CSN: [id number]                                                                              Non-opioid Controlled Substance Agreement    I understand that my care provider has prescribed a controlled substance to help manage my condition(s). I am taking this medicine to help me function or work. I know this is strong medicine, and that it can cause serious side effects. Controlled substances can be sedating, addicting and may cause a dependency on the drug. They can affect my ability to drive or think, and cause depression. They need to be taken exactly as prescribed. Combining controlled substances with certain medicines or chemicals (such as cocaine, sedatives and tranquilizers, sleeping pills, meth) can be dangerous or even fatal. Also, if I stop controlled substances suddenly, I may have severe withdrawal symptoms.  If not helpful, I may be asked to stop them.    The risks, benefits, and side effects of these medicine(s) were explained to me. I agree that:    1. I will take part in other treatments as advised by my care team. This may be psychiatry or counseling, physical therapy, behavioral therapy, group treatment or a referral to a pain clinic. I will reduce or stop my medicine when my care team tells me to do so.  2. I will take my medicines as prescribed. I will not change the dose or schedule unless my care team tells me to. There will be no refills if I run out early.  I may be contactedwithout warning and asked to complete a urine drug test or pill count at any time.   3. I will keep all my appointments, and understand this is part of the monitoring of controlled substances. My care team may require an office visit for EVERY  controlled substance refill. If I miss appointments or dont follow instructions, my care team may stop my medicine.  4. I will not ask other providers to prescribe controlled substances, and I will not accept controlled substances from other people. If I need another prescribed controlled substance for a new reason, I will tell my care team within 1 business day.  5. I will use one pharmacy to fill all of my controlled substance prescriptions, and it is up to me to make sure that I do not run out of my medicines on weekends or holidays. If my care team is willing to refill my controlled substance prescription without a visit, I must request refills only during office hours, refills may take up to 3 days to process, and it may take up to 5 to 7 days for my medicine to be mailed and ready at my pharmacy. Prescriptions will not be mailed anywhere except my pharmacy.    6. I am responsible for my prescriptions. If the medicine/prescription is lost or stolen, it will not be replaced. I also agree not to share controlled substance medicines with anyone.          [facility] INTERNAL MEDICINE  01/23/20  Patient:  Lupe Hill  YOB: 1940  Medical Record Number: [id number]  CSN: [id number]    7. I agree to not use ANY illegal or recreational drugs. This includes marijuana, cocaine, bath salts or other drugs. I agree not to use alcohol unless my care team says I may. I agree to give urine samples whenever asked. If I dont give a urine sample, the care team may stop my medicine.    8. If I enroll in the [place] Medical Marijuana program, I will tell my care team. I will also sign an agreement to share my medical records with my care team.    9. I will bring in my list of medicines (or my medicine bottles) each time I come to the clinic.   10. I will tell my care team right away if I become pregnant or have a new medical problem treated outside of my regular clinic.  11. I understand that this medicine can affect my  thinking and judgment. It may be unsafe for me to drive, use machinery and do dangerous tasks. I will not do any of these things until I know how the medicine affects me. If my dose changes, I will wait to see how it affects me. I will contact my care team if I have concerns about medicine side effects.    I understand that if I do not follow any of the conditions above, my prescriptions or treatment may be stopped.      I agree that my provider, clinic care team, and pharmacy may work with any city, state or federal law enforcement agency that investigates the misuse, sale, or other diversion of my controlled medicine. I will allow my provider to discuss my care with or share a copy of this agreement with any other treating provider, pharmacy or emergency room where I receive care. I agree to give up (waive) any right of privacy or confidentiality with respect to these consents.   I have read this agreement and have asked questions about anything I did not understand.    ___________________________________________________________________________  Patient signature - Date/Time  -Lupe Hill                                      ___________________________________________________________________________  Witness signature                                                                    ___________________________________________________________________________  Provider signature- Dar Wooten MD

## 2021-06-20 NOTE — PROGRESS NOTES
Admission History & Physical  Lupe Hill, 1940, [id number]    [facility] Formerly [facility], later [facility]  Dar Wooten MD, [phone]    Extended Emergency Contact Information  Primary Emergency Contact: Nino Hill  Address: [address] [place] [address] [place]  Home Phone: [phone]  Relation: Spouse  Secondary Emergency Contact: Prema Hill   [facility]  Home Phone: [phone]  Mobile Phone: [phone]  Relation: Child     Assessment and Plan:   Assessment: Onychomycosis, onychauxis, diabetes mellitus  Plan: Debrided nails bilateral feet  Active Problems:    * No active hospital problems. *      Chief Complaint:  Long thick nails both feet     HPI:    Lupe Hill is a 78 y.o. old female who presented to the clinic today complaining of long thick nails both feet.  The patient indicated she can no longer treat her nails.  She has not had any redness, swelling, drainage or bleeding surrounding her nails.  She denies any other previous treatment.  History is provided by patient    Medical History  Active Ambulatory (Non-Hospital) Problems    Diagnosis     BMI 35.0-35.9, in adult (H)     Acute diastolic heart failure (H)     CAD of autologous bypass graft     Renal cell cancer, left (H)     Elevated uric acid in blood     Hypernephroma, left (H)     Hypokalemia     Aortic stenosis, mild     CHF (congestive heart failure) (H)     Hx of CABG     Systolic murmur of aorta     Dyspnea, unspecified type     Pure hypercholesterolemia     Type 2 diabetes mellitus with stage 3 chronic kidney disease, without long-term current use of insulin (H)     Lymphedema of left lower extremity     Sleep apnea, obstructive     Anemia due to chronic illness     Non-ST elevation myocardial infarction (NSTEMI) due to mismatch of myocardial oxygen supply and demand (H)     Nephrolithiasis     Esophageal reflux     Lower Back Pain     Iron Deficiency     Allergic Rhinitis     Coronary  Artery Disease     Heller's Neuroma Of The Left Foot     COPD (chronic obstructive pulmonary disease) (H)     Essential hypertension with goal blood pressure less than 140/90     Iron deficiency anemia due to chronic blood loss     Anxiety     Benign Tubular Adenoma Of The Large Intestine     S/P cholecystectomy     Past Medical History:   Diagnosis Date     Arthritis      Asthma      COPD (chronic obstructive pulmonary disease) (H)      Coronary artery disease      Depression      Diabetes mellitus (H)      GERD (gastroesophageal reflux disease)      High cholesterol      Kidney stones      Lymphedema of left lower extremity      Sleep apnea      Patient Active Problem List    Diagnosis Date Noted     BMI 35.0-35.9, in adult (H) 07/31/2018     Acute diastolic heart failure (H)      CAD of autologous bypass graft      Renal cell cancer, left (H) 10/24/2017     Elevated uric acid in blood 08/17/2017     Hypernephroma, left (H) 08/02/2017     Hypokalemia 08/01/2017     Aortic stenosis, mild 07/30/2017     CHF (congestive heart failure) (H) 07/30/2017     Hx of CABG 07/29/2017     Systolic murmur of aorta      Dyspnea, unspecified type      Pure hypercholesterolemia 01/26/2017     Type 2 diabetes mellitus with stage 3 chronic kidney disease, without long-term current use of insulin (H) 01/26/2017     Lymphedema of left lower extremity 02/12/2016     Sleep apnea, obstructive 02/03/2016     Anemia due to chronic illness 02/02/2016     Non-ST elevation myocardial infarction (NSTEMI) due to mismatch of myocardial oxygen supply and demand (H) 02/01/2016     Nephrolithiasis      Esophageal reflux      Lower Back Pain      Iron Deficiency      Allergic Rhinitis      Coronary Artery Disease      Heller's Neuroma Of The Left Foot      COPD (chronic obstructive pulmonary disease) (H)      Essential hypertension with goal blood pressure less than 140/90      Iron deficiency anemia due to chronic blood loss      Anxiety       Benign Tubular Adenoma Of The Large Intestine      S/P cholecystectomy 07/13/2006     Surgical History  She  has a past surgical history that includes pr total abdom hysterectomy; pr cabg, vein, single; pr lap,cholecystectomy; Cholecystectomy (07/13/2006); heart bypass (2005); Hysterectomy (1977); pr cath plmt l hrt & arts w/njx & angio img s&i (Left, 7/31/2017); and Cardiac catheterization (N/A, 7/31/2017).   Past Surgical History:   Procedure Laterality Date     CARDIAC CATHETERIZATION N/A 7/31/2017    Procedure: Coronary Angiogram;  Surgeon: Anthony Diaz MD;  Location: [facility];  Service:      CHOLECYSTECTOMY  07/13/2006     heart bypass  2005     HYSTERECTOMY  1977     SD CABG, VEIN, SINGLE      Description: CABG (CABG);  Recorded: 11/02/2008;     SD CATH PLMT L HRT & ARTS W/NJX & ANGIO IMG S&I Left 7/31/2017    Procedure: Left Heart Catheterization Without Left Ventriculogram;  Surgeon: Anthony Diaz MD;  Location: [facility];  Service: Cardiology     SD LAP,CHOLECYSTECTOMY      Description: Cholecystectomy Laparoscopic;  Recorded: 01/07/2008;     SD TOTAL ABDOM HYSTERECTOMY      Description: Hysterectomy;  Recorded: 11/02/2008;    Social History  Reviewed, and she  reports that she quit smoking about 58 years ago. She has never used smokeless tobacco. She reports that she does not drink alcohol or use illicit drugs.  Social History   Substance Use Topics     Smoking status: Former Smoker     Quit date: 6/19/1960     Smokeless tobacco: Never Used     Alcohol use No      Allergies  Allergies   Allergen Reactions     Insulin Aspart      Subcutaneous insulin inj in 10/2013 led to abdominal wall cellulitis requiring debridement     Monosodium Glutamate     Family History  Reviewed, and family history includes Cancer in her sister; Diabetes in her father; Heart attack in her maternal grandfather and maternal uncle; Heart disease in her mother.   Psychosocial Needs  Social  History     Social History Narrative     Additional psychosocial needs reviewed per nursing assessment.       Prior to Admission Medications     (Not in a hospital admission)        Review of Systems - Negative    Pulse 94  Temp 99.3  F (37.4  C) (Temporal)   SpO2 91%    Objective findings: Nails bilateral feet are elongated and 2 times normal thickness.  Skin bilateral feet warm supple and intact.      Vascular: DP and PT pulses +2/4 bilateral feet.  Capillary refill less than 2 seconds bilateral feet.      Neurologic: Negative clonus, negative Babinski bilateral feet.        Musculoskeletal: Range of motion within normal limits bilaterally.  Muscle power +5/5 bilaterally in all compartments.      Assessment: Onychomycosis, onychauxis, and diabetes mellitus        Plan: Debrided nails 1 through 5 both feet.  I recommended the patient return to the clinic as needed.

## 2021-06-20 NOTE — PROGRESS NOTES
"MT Follow Up Encounter  Assessment & Plan                                                     1. Anxiety  Anxiety is greatly improved with the addition of low-dose Abilify once daily in the morning.  She is only taking her lorazepam on a very as-needed basis. Recommend to continue current regimen and follow-up with PCP in 3 weeks as previously discussed.    Follow Up  Return in about 3 weeks (around 9/20/2018) for with PCP .      Subjective & Objective                                                       Lupe Hill is a 78 y.o. female coming in for a follow up visit for Medication Therapy Management. She was referred to me from Dar Wooten MD. This is a two week follow up after medication adjustments.     Chief Complaint: two week follow up, addition of abilify     Medication Adherence/Access: stable, no issues identified.     Anxiety: she bakes and cooks, and her house is clean. She is \"wonderful.\" She has more energy. She has been sleeping well. She feels like \"me.\" She will want to do things and she will get up and do them. When she needs to adjust timing of her medications for doctors appts and she will do fine. She had a follow up on her renal cancer and they wanted to start treatment however she would like to postpone and reconsider next year. They are planning to move next year and she already was able to pack up 5 boxes to donate. Unfortunately she had \"aspirated a piece of corn\" and she had a time breathing, she called the EMT came to evaluate her and she was breathing fine, and now she is fine. She now has a bit of a cough. No longer using lorazepam scheduled, only as needed. Her , daughter, and friend all told her \"she is back.\" Denies dizziness, somnolence, tremor. 2-3 times per week she may have disturbing dreams, these have been going on for years, nothing new for her. Now that she is feeling better she is eating more, about 5lb weight gain.     PMH: reviewed in EPIC "   Allergies/ADRs: reviewed in EPIC   Alcohol: reviewed in EPIC   Tobacco:   History   Smoking Status     Former Smoker     Quit date: 6/19/1960   Smokeless Tobacco     Never Used     Today's Vitals:   Vitals:    08/30/18 0842   BP: 122/51   Pulse: 86   Weight: 193 lb 3.2 oz (87.6 kg)     ----------------    Much or all of the text in this note was generated through the use of Dragon Dictate voice-to-text software. Errors in spelling or words which seem out of context are unintentional. Sound alike errors, in particular, may have escaped editing.    The patient declined an after visit summary    I spent 30 minutes with this patient today;   All changes were made via collaborative practice agreement with Dar Wooten MD. A copy of the visit note was provided to the patient's provider.     Sebas Moncada, PharmD, BCACP  Medication Management (MTM) Pharmacist  [facility]       Current Outpatient Prescriptions   Medication Sig Dispense Refill     ARIPiprazole (ABILIFY) 5 MG tablet TAKE 1/2 TABLET BY MOUTH DAILY 45 tablet 3     isosorbide mononitrate (IMDUR) 30 MG 24 hr tablet Take 1 tablet (30 mg total) by mouth daily. 90 tablet 3     albuterol (PROAIR HFA) 90 mcg/actuation inhaler Inhale 2 puffs every 6 (six) hours as needed for wheezing. 1 Inhaler 3     allopurinol (ZYLOPRIM) 300 MG tablet TAKE 1 TABLET(300 MG) BY MOUTH DAILY 90 tablet 3     aspirin 81 MG EC tablet Take 1 tablet (81 mg total) by mouth daily.  0     atorvastatin (LIPITOR) 10 MG tablet Take 1 tablet (10 mg total) by mouth daily. 90 tablet 3     blood glucose test (ACCU-CHEK OLY PLUS TEST STRP) strips TEST THREE TIMES DAILY 300 strip 3     buPROPion (WELLBUTRIN SR) 100 MG 12 hr tablet Take 1 tablet (100 mg total) by mouth daily. 90 tablet 3     calcium citrate (CALCITRATE) 200 mg (950 mg) tablet Take 1 tablet by mouth 2 (two) times a day.       cholecalciferol, vitamin D3, 5,000 unit Tab Take 1 tablet by mouth daily.        cyanocobalamin (VITAMIN B-12) 500 MCG tablet Take 500 mcg by mouth daily.       diltiazem (DILACOR XR) 240 MG 24 hr capsule Take 1 capsule (240 mg total) by mouth daily. 90 capsule 3     docusate sodium (COLACE) 50 MG capsule Take 100 mg by mouth daily as needed for constipation.       ferrous sulfate 325 (65 FE) MG tablet Take 1 tablet by mouth daily with breakfast.       FLUoxetine (PROZAC) 20 MG capsule Take 1 capsule (20 mg total) by mouth 2 (two) times a day. At 7:10am and 1030am 180 capsule 3     fluticasone-salmeterol (ADVAIR DISKUS) 500-50 mcg/dose DISKUS Inhale 1 puff 2 (two) times a day. 3 each 3     furosemide (LASIX) 40 MG tablet Take 1 tablet (40 mg total) by mouth daily. 90 tablet 3     generic lancets (FINGERSTIX LANCETS) Use 1 each As Directed 3 (three) times a day. Dispense brand per patient's insurance at pharmacy discretion. 300 each 3     glipiZIDE (GLUCOTROL) 5 MG tablet Take 1 tablet (5 mg total) by mouth daily. 90 tablet 3     ipratropium-albuterol (DUO-NEB) 0.5-2.5 mg/3 mL nebulizer Inhale 3 mL every 6 (six) hours as needed.        LORazepam (ATIVAN) 1 MG tablet TAKE 1 TABLET(1 MG) BY MOUTH TWICE DAILY AS NEEDED FOR ANXIETY 20 tablet 0     metFORMIN (GLUCOPHAGE) 500 MG tablet Take 2 tablets (1,000 mg total) by mouth 2 (two) times a day. 360 tablet 3     naproxen (NAPROSYN) 500 MG tablet TAKE 1 TABLET(500 MG) BY MOUTH DAILY 90 tablet 3     nitroglycerin (NITROSTAT) 0.4 MG SL tablet Place 0.4 mg under the tongue every 5 (five) minutes as needed for chest pain.       nortriptyline (PAMELOR) 25 MG capsule Take 1 capsule (25 mg total) by mouth at bedtime as needed. 90 capsule 3     OMEGA-3/DHA/EPA/FISH OIL (FISH OIL-OMEGA-3 FATTY ACIDS) 300-1,000 mg capsule Take 1 g by mouth daily.        omeprazole (PRILOSEC) 20 MG capsule TAKE 1 CAPSULE(20 MG) BY MOUTH TWICE DAILY 180 capsule 3     potassium chloride SA (K-DUR,KLOR-CON) 20 MEQ tablet TAKE 1 TABLET(20 MEQ) BY MOUTH DAILY 90 tablet 2      sitaGLIPtin (JANUVIA) 100 MG tablet Take 100 mg by mouth daily. From patient assistance program. Do not refill.       vitamin E 400 unit capsule Take 400 Units by mouth daily.       No current facility-administered medications for this visit.

## 2021-06-20 NOTE — PROGRESS NOTES
ASSESSMENT:  1. Anxiety  Marked, dramatic, and satisfying improvement with addition of low-dose Abilify.  Discussed adjustment of dose however we will leave the same for now.  Dose of lorazepam usage has markedly decreased    2. Type 2 diabetes mellitus with stage 3 chronic kidney disease, without long-term current use of insulin (H)  Doing well.  Update labs including microalbumin  - Glycosylated Hemoglobin A1C  - JI RED  - JIC LAV  - Microalbumin, Random Urine    3. Medication management  Update labs  - Glycosylated Hemoglobin A1C  - JIC RED  - JIC LAV    4.  COPD.  Doing well    5.  Ophthalmologic issues.  Obtain records from ophthalmology.  She indicates she is receiving shots in her eye    PLAN:  Patient Instructions   Urine sample today    Diabetes is great    We need records from Dr Diana    We can increase Abilify a lot more, but not now.  You are feeling good      Orders Placed This Encounter   Procedures     Glycosylated Hemoglobin A1C     JIC RED     JIC LAV     There are no discontinued medications.    Return in about 4 weeks (around 10/16/2018).      CHIEF COMPLAINT:  Chief Complaint   Patient presents with     Follow-up       HISTORY OF PRESENT ILLNESS:  Lupe is a 78 y.o. female presenting to the clinic today to follow up on her anxiety.     Anxiety: Her anxiety was much worse the last time she was here one month ago. She was started on Abilify 2.5 mg daily at that time. By the fifth day, she was feeling much better. The Abilify made her feel much more mellow and back to her normal self. Others have noticed the difference as well. She cut way back on her lorazepam use as a result. The only times she has had to take lorazepam since she was last here were the days the humidity was really high. She loves how she is feeling and does not feel the need to go up on her dose of Abilify.     Eye Problems: She saw her eye doctor and got new glasses recently. She states she has a hole somewhere in her left eye  "but that it is getting smaller. The pressures in her eyes are also improving. She gets shots in her eyes and is scheduled to get another shot in eight weeks. She is seen by a Dr. Diana, but we do not have records on file.     Health Maintenance: She will get the flu shot the next time she is here.     REVIEW OF SYSTEMS:   She was also just seen by podiatry and had her toenails cut. She is not having any pain. All other systems are negative.    PFSH:  Reviewed, as below.     TOBACCO USE:  History   Smoking Status     Former Smoker     Quit date: 6/19/1960   Smokeless Tobacco     Never Used       VITALS:  Vitals:    09/18/18 1538   BP: 100/50   Patient Site: Left Arm   Patient Position: Sitting   Cuff Size: Adult Large   Pulse: 78   Resp: 18   SpO2: 96%   Weight: 195 lb 2 oz (88.5 kg)   Height: 5' 2\" (1.575 m)     Wt Readings from Last 3 Encounters:   09/18/18 195 lb 2 oz (88.5 kg)   08/30/18 193 lb 3.2 oz (87.6 kg)   08/16/18 187 lb 12.8 oz (85.2 kg)     Body mass index is 35.69 kg/(m^2).    PHYSICAL EXAM:  Constitutional:  Reveals an alert, pleasant, talkative woman. Affect appropriate.  Vitals:  Per nursing notes.  Neurologic:  Cranial nerves II-XII intact.     Psychiatric:  Mood appropriate, memory intact.     MEDICATIONS:  Current Outpatient Prescriptions   Medication Sig Dispense Refill     albuterol (PROAIR HFA) 90 mcg/actuation inhaler Inhale 2 puffs every 6 (six) hours as needed for wheezing. 1 Inhaler 3     allopurinol (ZYLOPRIM) 300 MG tablet TAKE 1 TABLET(300 MG) BY MOUTH DAILY 90 tablet 3     ARIPiprazole (ABILIFY) 5 MG tablet TAKE 1/2 TABLET BY MOUTH DAILY 45 tablet 3     aspirin 81 MG EC tablet Take 1 tablet (81 mg total) by mouth daily.  0     atorvastatin (LIPITOR) 10 MG tablet Take 1 tablet (10 mg total) by mouth daily. 90 tablet 3     blood glucose test (ACCU-CHEK OLY PLUS TEST STRP) strips TEST THREE TIMES DAILY 300 strip 3     buPROPion (WELLBUTRIN SR) 100 MG 12 hr tablet Take 1 tablet (100 mg " total) by mouth daily. 90 tablet 3     calcium citrate (CALCITRATE) 200 mg (950 mg) tablet Take 1 tablet by mouth 2 (two) times a day.       cholecalciferol, vitamin D3, 5,000 unit Tab Take 1 tablet by mouth daily.       cyanocobalamin (VITAMIN B-12) 500 MCG tablet Take 500 mcg by mouth daily.       diltiazem (DILACOR XR) 240 MG 24 hr capsule Take 1 capsule (240 mg total) by mouth daily. 90 capsule 3     docusate sodium (COLACE) 50 MG capsule Take 100 mg by mouth daily as needed for constipation.       ferrous sulfate 325 (65 FE) MG tablet Take 1 tablet by mouth daily with breakfast.       FLUoxetine (PROZAC) 20 MG capsule Take 1 capsule (20 mg total) by mouth 2 (two) times a day. At 7:10am and 1030am 180 capsule 3     fluticasone-salmeterol (ADVAIR DISKUS) 500-50 mcg/dose DISKUS Inhale 1 puff 2 (two) times a day. 3 each 3     furosemide (LASIX) 40 MG tablet Take 1 tablet (40 mg total) by mouth daily. 90 tablet 3     generic lancets (FINGERSTIX LANCETS) Use 1 each As Directed 3 (three) times a day. Dispense brand per patient's insurance at pharmacy discretion. 300 each 3     glipiZIDE (GLUCOTROL) 5 MG tablet Take 1 tablet (5 mg total) by mouth daily. 90 tablet 3     ipratropium-albuterol (DUO-NEB) 0.5-2.5 mg/3 mL nebulizer Inhale 3 mL every 6 (six) hours as needed.        isosorbide mononitrate (IMDUR) 30 MG 24 hr tablet Take 1 tablet (30 mg total) by mouth daily. 90 tablet 3     LORazepam (ATIVAN) 1 MG tablet TAKE 1 TABLET(1 MG) BY MOUTH TWICE DAILY AS NEEDED FOR ANXIETY 20 tablet 0     metFORMIN (GLUCOPHAGE) 500 MG tablet Take 2 tablets (1,000 mg total) by mouth 2 (two) times a day. 360 tablet 3     naproxen (NAPROSYN) 500 MG tablet TAKE 1 TABLET(500 MG) BY MOUTH DAILY 90 tablet 3     nitroglycerin (NITROSTAT) 0.4 MG SL tablet Place 0.4 mg under the tongue every 5 (five) minutes as needed for chest pain.       nortriptyline (PAMELOR) 25 MG capsule Take 1 capsule (25 mg total) by mouth at bedtime as needed. 90  capsule 3     OMEGA-3/DHA/EPA/FISH OIL (FISH OIL-OMEGA-3 FATTY ACIDS) 300-1,000 mg capsule Take 1 g by mouth daily.        omeprazole (PRILOSEC) 20 MG capsule TAKE 1 CAPSULE(20 MG) BY MOUTH TWICE DAILY 180 capsule 3     potassium chloride SA (K-DUR,KLOR-CON) 20 MEQ tablet TAKE 1 TABLET(20 MEQ) BY MOUTH DAILY 90 tablet 2     sitaGLIPtin (JANUVIA) 100 MG tablet Take 100 mg by mouth daily. From patient assistance program. Do not refill.       vitamin E 400 unit capsule Take 400 Units by mouth daily.       No current facility-administered medications for this visit.        ADDITIONAL HISTORY SUMMARIZED (2): Reviewed 8/30/2018 Hannah Moncada, JacquelineD note regarding anxiety.   DECISION TO OBTAIN EXTRA INFORMATION (1): None.   RADIOLOGY TESTS (1): None.  LABS (1): Labs from 7/29/2018 reviewed. Labs ordered.   MEDICINE TESTS (1): None.  INDEPENDENT REVIEW (2 each): None.     The visit lasted a total of 10 minutes face to face with the patient. Over 50% of the time was spent counseling and educating the patient about her anxiety.    I, Teodoro Ibarra, am scribing for and in the presence of, Dr. Wooten.    I, Dr. Wooten, personally performed the services described in this documentation, as scribed by Teodoro Ibarra in my presence, and it is both accurate and complete.    Total data points: 3

## 2021-06-21 NOTE — PROGRESS NOTES
"TCM DISCHARGE FOLLOW UP CALL    Discharge Date:  10/11/2018  Reason for hospital stay (discharge diagnosis)::  CHF (Congestive Heart Failure)  Are you feeling better, the same or worse since your discharge?:  Patient is feeling better (Home w/oxygen; pt is using 2L w/activity, none at rest.)  Do you feel like you have a plan in the event of a health emergency?: Yes (\" w/me; if not feeling good I call my daughter, who is a nurse, and if it's too bad call 911 & go to [facility].\")    As part of your discharge plan, were  home care services ordered for you?: Yes    Have you seen them yet, or are they scheduled to visit?: Yes    Did you receive any new medications, or was there a change to your medications?: Yes    Are you taking those medications, or do you have any established regiment?:  RN reviewed new & changed d/c medications w/pt.  Pt states she's made all med changes as instructed & taking as prescribed.  Correctly reported having increased her furosemide to 80 mg daily, \"& it's working,\" along w/increasing her iron to 325 mg three times a day w/meals.  Other medications were sent home w/pt and she has started them as prescribed & has had no problems w/side effects.  Pt confirmed having stopped Naproxen, Doxycycline, & Diltiazem, as instructed.  Do you have any follow up visits scheduled with your PCP or Specialist?:  Yes, with PCP (Dr. Wooten 10/15/18)  (RN) Is PCP appt scheduled soon enough (within 14 days of discharge date)?: Yes        Savannah Page RN Care Manager, Population Health    "

## 2021-06-21 NOTE — PROGRESS NOTES
ASSESSMENT:  1. Pneumonia of right lower lobe due to infectious organism (H)  Reviewed hospitalization.  Continue a azithromycin.  Monitor hemoglobin and white count.  Possible that all of her pneumonia symptoms were actually heart failure too early to tell  - HM2(CBC w/o Differential)    2. Health maintenance alteration    3. Health care maintenance  - Td, Preservative Free (green label)    4. Type 2 diabetes mellitus with stage 3 chronic kidney disease, without long-term current use of insulin (H)  Stable without hypoglycemia.  Refill glipizide.  Overall control excellent  - glipiZIDE (GLUCOTROL) 5 MG tablet; Take 1 tablet (5 mg total) by mouth daily before breakfast.  Dispense: 90 tablet; Refill: 3    5. Acute pulmonary edema (H)  CT scan shows pulmonary edema and pleural effusions.  Etiology is unclear.  Echocardiogram shows slightly decreased ejection fraction but not appreciably different.  Continue aggressive diuresis while we continue to sort out how exactly she worsened  - Basic Metabolic Panel    6. Anxiety  Refill lorazepam.  Clarify fluoxetine and Abilify  - LORazepam (ATIVAN) 1 MG tablet; TAKE 1 TABLET(1 MG) BY MOUTH TWICE DAILY AS NEEDED FOR ANXIETY  Dispense: 20 tablet; Refill: 0  - FLUoxetine (PROZAC) 20 MG capsule; Take 1 capsule (20 mg total) by mouth 2 (two) times a day.  Dispense: 180 capsule; Refill: 3    7. Chronic combined systolic and diastolic congestive heart failure (H)  Monitor by weight which is decreasing, exam which shows fluid, and BNP.  Goal weight may be approximately 180 pounds.  Continue oxygen  - BNP(B-type Natriuretic Peptide)      8.  Anemia.  Encourage again to hold aspirin and naproxen while monitoring hemoglobin for probable ulcer    Hospital follow-up and hospitalization reviewed.  All medications reviewed and reconciled    PLAN:  Patient Instructions   Stop Nortriptyline for sleep, since it is not helping    Stop Isosorbide for blood pressure.  Your blood pressure is  running a bit low    Finish Azithromycin antibiotic    Assume for now that more of this is from fluid than from infection    Lets aim for home weight of 180, and stay there    When your weight gets to 180, decrease the Furosemide to 40 mgs once a day    Weight is above 180, take 80 mgs daily    Refilled meds    Update blood tests    Home therapy and oxygen      Orders Placed This Encounter   Procedures     Td, Preservative Free (green label)     Basic Metabolic Panel     HM2(CBC w/o Differential)     BNP(B-type Natriuretic Peptide)     Medications Discontinued During This Encounter   Medication Reason     nortriptyline (PAMELOR) 25 MG capsule      LORazepam (ATIVAN) 1 MG tablet Reorder     FLUoxetine (PROZAC) 20 MG capsule Reorder     glipiZIDE (GLUCOTROL) 5 MG tablet Reorder     isosorbide mononitrate (IMDUR) 30 MG 24 hr tablet      DILT- mg 24 hr capsule        Return in about 2 weeks (around 10/29/2018) for for a full review of everything we did today, with an update on the effects of the medications.    All patient medications were reconciled.     CHIEF COMPLAINT:  Chief Complaint   Patient presents with     Follow-up     DOD 10/11/18     Immunizations     Due for TDm, CA pend the order     Diabetes     follow up     Medication Refill       HISTORY OF PRESENT ILLNESS:  Lupe is a 78 y.o. female presenting to the clinic today for a hospital follow up. She was in [facility] from 10/9 to 10/11 for congestive heart failure. She is accompanied by her .     CHF: Looking back, she thinks it was more fluid overload causing her breathing trouble than the pneumonia. She had her dose of furosemide increased from 40 mg to 80 mg once daily in the hospital, which has made a big difference. She has been urinating quite a bit as a result. She would estimate that she is going every 30 minutes. She does not have much lower extremity swelling now. Her weight has been going down at home, and she thinks it is  down about 12 pounds from the hospital.     Hypertension: She was started on losartan when she left the hospital. She has not been dizzy or lightheaded with it, and her blood pressure has been in a good range at home lately. She was also started on metoprolol, which replaced diltiazem; this has not affected her breathing. She has not had any headaches from isosorbide.    Pneumonia: She was discharged on azithromycin for her pneumonia and has two days left of the antibiotic. She continues to have a slight productive cough, but her phlegm is now a clear white color. She is also blowing phlegm out of her nose, which is also white in color. She has not had any chest pain.     Hypoxia: She was started on portable oxygen when she was in the hospital and has been doing well with this. She has not been as short of breath when doing chores around the house now, which has been nice.     Diabetes: Her blood sugars have been around 130, on average. She had one blood sugar of 85 but nothing lower than that. Her last hemoglobin A1c was 7.3% on 9/18/2018.     Health Maintenance: She got a flu shot while she was in the hospital. She will get a tetanus shot today.     REVIEW OF SYSTEMS:   She has been sleeping very well lately. She did not notice any benefit from nortriptyline, so she stopped taking it. Her stools are firmer now. Her nose has been running for the past four days or so. It seems to run until about 4 PM until stopping. She is taking iron three times daily, which she was taking prior to going to the hospital. All other systems are negative.    PFSH:  Reviewed, as below.     TOBACCO USE:  History   Smoking Status     Former Smoker     Quit date: 6/19/1960   Smokeless Tobacco     Never Used       VITALS:  Vitals:    10/15/18 1523   BP: 92/52   Patient Site: Left Arm   Patient Position: Sitting   Cuff Size: Adult Regular   Pulse: 86   SpO2: 98%   Weight: 184 lb (83.5 kg)     Wt Readings from Last 3 Encounters:   10/15/18  184 lb (83.5 kg)   10/14/18 185 lb (83.9 kg)   10/13/18 186 lb (84.4 kg)     Body mass index is 33.65 kg/(m^2).    PHYSICAL EXAM:  Constitutional:  Reveals an alert, talkative woman who presents in a wheelchair on home oxygen.  Vitals:  Per nursing notes.  Cardiac:  Regular rate and rhythm without murmurs, rubs, or gallops. Legs with 1+ edema bilaterally. Palpation of the radial pulse regular.  Lungs: A few crackles at the right base. Respiratory effort normal.  Neurologic:  Cranial nerves II-XII intact.     Psychiatric:  Mood appropriate, memory intact.     MEDICATIONS:  Current Outpatient Prescriptions   Medication Sig Dispense Refill     albuterol (PROAIR HFA) 90 mcg/actuation inhaler Inhale 2 puffs every 6 (six) hours as needed for wheezing. 1 Inhaler 3     allopurinol (ZYLOPRIM) 300 MG tablet TAKE 1 TABLET(300 MG) BY MOUTH DAILY 90 tablet 3     ARIPiprazole (ABILIFY) 5 MG tablet TAKE 1/2 TABLET BY MOUTH DAILY 45 tablet 3     aspirin 81 MG EC tablet Take 1 tablet (81 mg total) by mouth daily.  0     atorvastatin (LIPITOR) 10 MG tablet Take 1 tablet (10 mg total) by mouth daily. 90 tablet 3     blood glucose test (ACCU-CHEK OLY PLUS TEST STRP) strips TEST THREE TIMES DAILY 300 strip 3     buPROPion (WELLBUTRIN SR) 100 MG 12 hr tablet Take 1 tablet (100 mg total) by mouth daily. 90 tablet 3     calcium citrate (CALCITRATE) 200 mg (950 mg) tablet Take 1 tablet by mouth 2 (two) times a day.       cholecalciferol, vitamin D3, 5,000 unit Tab Take 5,000 Units by mouth daily.        cyanocobalamin (VITAMIN B-12) 500 MCG tablet Take 500 mcg by mouth daily.       docusate sodium (COLACE) 50 MG capsule Take 100 mg by mouth daily as needed for constipation.       ferrous sulfate 325 (65 FE) MG tablet Take 1 tablet (325 mg total) by mouth 3 (three) times a day with meals.  0     FLUoxetine (PROZAC) 20 MG capsule Take 1 capsule (20 mg total) by mouth 2 (two) times a day. 180 capsule 3     fluticasone-salmeterol (ADVAIR  DISKUS) 500-50 mcg/dose DISKUS Inhale 1 puff 2 (two) times a day. 3 each 3     furosemide (LASIX) 40 MG tablet Take 2 tablets (80 mg total) by mouth daily. 90 tablet 3     generic lancets (FINGERSTIX LANCETS) Use 1 each As Directed 3 (three) times a day. Dispense brand per patient's insurance at pharmacy discretion. 300 each 3     glipiZIDE (GLUCOTROL) 5 MG tablet Take 1 tablet (5 mg total) by mouth daily before breakfast. 90 tablet 3     ipratropium-albuterol (DUO-NEB) 0.5-2.5 mg/3 mL nebulizer Take 3 mL by nebulization every 6 (six) hours as needed.        LORazepam (ATIVAN) 1 MG tablet TAKE 1 TABLET(1 MG) BY MOUTH TWICE DAILY AS NEEDED FOR ANXIETY 20 tablet 0     losartan (COZAAR) 25 MG tablet Take 1 tablet (25 mg total) by mouth at bedtime. 30 tablet 0     metFORMIN (GLUCOPHAGE) 500 MG tablet Take 1,000 mg by mouth 2 (two) times a day with meals.        metoprolol succinate (TOPROL-XL) 50 MG 24 hr tablet Take 1 tablet (50 mg total) by mouth at bedtime. 30 tablet 0     nitroglycerin (NITROSTAT) 0.4 MG SL tablet Place 0.4 mg under the tongue every 5 (five) minutes as needed for chest pain.       OMEGA-3/DHA/EPA/FISH OIL (FISH OIL-OMEGA-3 FATTY ACIDS) 300-1,000 mg capsule Take 1 g by mouth daily.        omeprazole (PRILOSEC) 20 MG capsule TAKE 1 CAPSULE(20 MG) BY MOUTH TWICE DAILY 180 capsule 3     OXYGEN-AIR DELIVERY SYSTEMS MISC 2 L/min into each nostril as needed (with activity). Indications: use with activity to maintain sats > 90%       potassium chloride SA (K-DUR,KLOR-CON) 20 MEQ tablet TAKE 1 TABLET(20 MEQ) BY MOUTH DAILY 90 tablet 2     sitaGLIPtin (JANUVIA) 100 MG tablet Take 100 mg by mouth daily. From patient assistance program. Do not refill.       spironolactone (ALDACTONE) 25 MG tablet Take 0.5 tablets (12.5 mg total) by mouth at bedtime. 30 tablet 0     vitamin E 400 unit capsule Take 400 Units by mouth daily.       No current facility-administered medications for this visit.        ADDITIONAL  HISTORY SUMMARIZED (2): Reviewed RN case manager note from earlier today regarding medications. Reviewed 10/9 - 10/11 hospital notes regarding CHF  DECISION TO OBTAIN EXTRA INFORMATION (1): None.   RADIOLOGY TESTS (1): Reviewed 10/5 X-ray, 10/9 X-ray, and 10/10 CT chest regarding pneumonia and fluid  LABS (1): Labs from 10/9 - 10/11 hospitalization reviewed. Labs ordered.   MEDICINE TESTS (1): None.  INDEPENDENT REVIEW (2 each): None.     The visit lasted a total of 23 minutes face to face with the patient. Over 50% of the time was spent counseling and educating the patient about her recent hospitalization.    ITeodoro, am scribing for and in the presence of, Dr. Wooten.    I, Dr. Wooten, personally performed the services described in this documentation, as scribed by Teodoro Ibarra in my presence, and it is both accurate and complete.    Total data points: 4

## 2021-06-21 NOTE — PROGRESS NOTES
ASSESSMENT:  1. Essential hypertension  Blood pressure now too low.  Reviewed medication changes done in the hospital including conversion from diltiazem to metoprolol, addition of losartan, Spironolactone, and increased furosemide.  No worsening with cessation of isosorbide.  Discontinue losartan and Spironolactone.  Some of the edema improvement may have been conversion from diltiazem to metoprolol  - metoprolol succinate (TOPROL-XL) 50 MG 24 hr tablet; Take 1 tablet (50 mg total) by mouth at bedtime.  Dispense: 90 tablet; Refill: 3    2. Anxiety  Markedly improved with addition of Abilify with decreased use of lorazepam noted  - LORazepam (ATIVAN) 1 MG tablet; TAKE 1 TABLET(1 MG) BY MOUTH TWICE DAILY AS NEEDED FOR ANXIETY  Dispense: 20 tablet; Refill: 0    3. Diabetes mellitus, type 2 (H)  Stable.  Refill meds  - metFORMIN (GLUCOPHAGE) 500 MG tablet; Take 2 tablets (1,000 mg total) by mouth 2 (two) times a day with meals.  Dispense: 360 tablet; Refill: 3    4. Type 2 diabetes mellitus with stage 3 chronic kidney disease, without long-term current use of insulin (H)  Stable.  Refill meds      5.  Peripheral vascular disease.  Question.  No symptoms.  No treatment at this time other than normal    PLAN:  Patient Instructions   Blood pressure too low    Stop Losartan at night    Stop Spironolactone at night    Peripheral vascular disease I would only worry about if you had cramping in your calves with walking    Goal blood pressure is 120-140 top number, and a pulse 60-80        No orders of the defined types were placed in this encounter.    Medications Discontinued During This Encounter   Medication Reason     spironolactone (ALDACTONE) 25 MG tablet      losartan (COZAAR) 25 MG tablet      LORazepam (ATIVAN) 1 MG tablet Reorder     metFORMIN (GLUCOPHAGE) 500 MG tablet Reorder     metoprolol succinate (TOPROL-XL) 50 MG 24 hr tablet Reorder       Return in about 4 weeks (around 11/30/2018) for for a full review of  medications and lab testing.      CHIEF COMPLAINT:  Chief Complaint   Patient presents with     Follow-up     5 weeks       HISTORY OF PRESENT ILLNESS:  Lupe is a 78 y.o. female presenting to the clinic today to follow up on her hypertension and CHF.     Hypertension: She stopped isosorbide when she was seen here two weeks ago because of hypotension. She has had a couple of low readings since then, and it is on the low end of normal today. She is occasionally light headed when she stands up from a seated position. She takes her losartan in the evening. She has seen low readings both in the morning and the afternoon.     CHF: Her weight has been stable since she was last seen here in the clinic two weeks ago. She is taking furosemide 80 mg daily and has not had any lower extremity swelling. Her breathing has been good lately and the heaviness in her chest is resolved.     Anxiety: She takes lorazepam as needed for anxiety. She took one before coming here today, for example. Her mood has been good, overall, and she was able to cut back on the amount of lorazepam she takes when she started Abilify.     Diabetes: Her blood sugars have been in a good range. She is careful about planning out her meals, and she has not had any episodes of hypoglycemia. Her last hemoglobin A1c was 7.3% on 9/18/2018.     REVIEW OF SYSTEMS:   She has been eating regularly, but her appetite has not been the best. She occasionally gets cramps in her legs at night. She does not get cramps when she is walking. She has recovered from her pneumonia and is feeling great. She has not had a cough at all in the past three days. Her heart rate has been averaging in the low 70s. She is fatigued by 5 PM. Her mood has been good. All other systems are negative.    PFSH:  Reviewed, as below.     TOBACCO USE:  History   Smoking Status     Former Smoker     Quit date: 6/19/1960   Smokeless Tobacco     Never Used       VITALS:  Vitals:    11/02/18 1603   BP:  110/42   Patient Site: Left Arm   Patient Position: Sitting   Cuff Size: Adult Regular   Pulse: 100   Weight: 184 lb 14.4 oz (83.9 kg)     Wt Readings from Last 3 Encounters:   11/02/18 184 lb 14.4 oz (83.9 kg)   10/30/18 180 lb (81.6 kg)   10/24/18 180 lb (81.6 kg)     Body mass index is 33.82 kg/(m^2).    PHYSICAL EXAM:  Constitutional:  Reveals an alert, pleasant, talkative, elderly woman.  Ambulates with a wheeled walker. Vitals:  Per nursing notes.  Cardiac:  Regular rate and rhythm without murmurs, rubs, or gallops. Legs without edema. Palpation of the radial pulse regular.  Lungs: Clear.  Respiratory effort normal.  Neurologic:  Cranial nerves II-XII intact.     Psychiatric:  Mood appropriate, memory intact.       MEDICATIONS:  Current Outpatient Prescriptions   Medication Sig Dispense Refill     albuterol (PROAIR HFA) 90 mcg/actuation inhaler Inhale 2 puffs every 6 (six) hours as needed for wheezing. 1 Inhaler 3     allopurinol (ZYLOPRIM) 300 MG tablet TAKE 1 TABLET(300 MG) BY MOUTH DAILY 90 tablet 3     ARIPiprazole (ABILIFY) 5 MG tablet TAKE 1/2 TABLET BY MOUTH DAILY 45 tablet 3     aspirin 81 MG EC tablet Take 1 tablet (81 mg total) by mouth daily.  0     atorvastatin (LIPITOR) 10 MG tablet Take 1 tablet (10 mg total) by mouth daily. 90 tablet 3     blood glucose test (ACCU-CHEK OLY PLUS TEST STRP) strips TEST THREE TIMES DAILY 300 strip 3     buPROPion (WELLBUTRIN SR) 100 MG 12 hr tablet Take 1 tablet (100 mg total) by mouth daily. 90 tablet 3     calcium citrate (CALCITRATE) 200 mg (950 mg) tablet Take 1 tablet by mouth 2 (two) times a day.       cholecalciferol, vitamin D3, 5,000 unit Tab Take 5,000 Units by mouth daily.        cyanocobalamin (VITAMIN B-12) 500 MCG tablet Take 500 mcg by mouth daily.       docusate sodium (COLACE) 50 MG capsule Take 100 mg by mouth daily as needed for constipation.       ferrous sulfate 325 (65 FE) MG tablet Take 1 tablet (325 mg total) by mouth 3 (three) times a day  with meals.  0     FLUoxetine (PROZAC) 20 MG capsule Take 1 capsule (20 mg total) by mouth 2 (two) times a day. 180 capsule 3     fluticasone-salmeterol (ADVAIR DISKUS) 500-50 mcg/dose DISKUS Inhale 1 puff 2 (two) times a day. 3 each 3     furosemide (LASIX) 40 MG tablet Take 2 tablets (80 mg total) by mouth daily. 90 tablet 3     generic lancets (FINGERSTIX LANCETS) Use 1 each As Directed 3 (three) times a day. Dispense brand per patient's insurance at pharmacy discretion. 300 each 3     glipiZIDE (GLUCOTROL) 5 MG tablet Take 1 tablet (5 mg total) by mouth daily before breakfast. 90 tablet 3     ipratropium-albuterol (DUO-NEB) 0.5-2.5 mg/3 mL nebulizer Take 3 mL by nebulization every 6 (six) hours as needed.        LORazepam (ATIVAN) 1 MG tablet TAKE 1 TABLET(1 MG) BY MOUTH TWICE DAILY AS NEEDED FOR ANXIETY 20 tablet 0     metFORMIN (GLUCOPHAGE) 500 MG tablet Take 2 tablets (1,000 mg total) by mouth 2 (two) times a day with meals. 360 tablet 3     metoprolol succinate (TOPROL-XL) 50 MG 24 hr tablet Take 1 tablet (50 mg total) by mouth at bedtime. 90 tablet 3     nitroglycerin (NITROSTAT) 0.4 MG SL tablet Place 0.4 mg under the tongue every 5 (five) minutes as needed for chest pain.       OMEGA-3/DHA/EPA/FISH OIL (FISH OIL-OMEGA-3 FATTY ACIDS) 300-1,000 mg capsule Take 1 g by mouth daily.        omeprazole (PRILOSEC) 20 MG capsule TAKE 1 CAPSULE(20 MG) BY MOUTH TWICE DAILY 180 capsule 3     OXYGEN-AIR DELIVERY SYSTEMS MISC 2 L/min into each nostril as needed (with activity). Indications: use with activity to maintain sats > 90%       potassium chloride SA (K-DUR,KLOR-CON) 20 MEQ tablet TAKE 1 TABLET(20 MEQ) BY MOUTH DAILY 90 tablet 2     sitaGLIPtin (JANUVIA) 100 MG tablet Take 100 mg by mouth daily. From patient assistance program. Do not refill.       vitamin E 400 unit capsule Take 400 Units by mouth daily.       No current facility-administered medications for this visit.        ADDITIONAL HISTORY SUMMARIZED  (2): None.  DECISION TO OBTAIN EXTRA INFORMATION (1): None.   RADIOLOGY TESTS (1): Reviewed 10/10 CT chest regarding pleural effusions  LABS (1): labs from 10/15/2018 reviewed -   MEDICINE TESTS (1): None.  INDEPENDENT REVIEW (2 each): None.     The visit lasted a total of 17 minutes face to face with the patient. Over 50% of the time was spent counseling and educating the patient about her hypertension and CHF.    ITeodoro, am scribing for and in the presence of, Dr. Wooten.    I, Dr. Wooten, personally performed the services described in this documentation, as scribed by Teodoro Ibarra in my presence, and it is both accurate and complete.    Total data points: 2

## 2021-06-21 NOTE — LETTER
Letter by Dar Wooten MD at      Author: Dar Wooten MD Service: -- Author Type: --    Filed:  Encounter Date: 11/23/2020 Status: (Other)         Lupe Hill  [address] [place] [address]             November 23, 2020         Dear Ms. Hill,    Below are the results from your recent visit:    Resulted Orders   Microalbumin, Random Urine   Result Value Ref Range    Microalbumin, Random Urine 3.60 (H) 0.00 - 1.99 mg/dL    Creatinine, Urine 23.3 mg/dL    Microalbumin/Creatinine Ratio Random Urine 154.5 (H) <=19.9 mg/g    Narrative    Microalbumin, Random Urine  <2.0 mg/dL . . . . . . . . Normal  3.0-30.0 mg/dL . . . . . . Microalbuminuria  >30.0 mg/dL . . . . . .  . Clinical Proteinuria    Microalbumin/Creatinine Ratio, Random Urine  <20 mg/g . . . . .. . . . Normal   mg/g . . . . . . . Microalbuminuria  >300 mg/g . . . . . . . . Clinical Proteinuria       Glycosylated Hemoglobin A1c   Result Value Ref Range    Hemoglobin A1c 6.4 (H) <=5.6 %      Comment:      Normal <5.7% Prediabete 5.7-6.4% Diabletes 6.5% or higher - adopted from ADA consensus guidelines   Comprehensive Metabolic Panel   Result Value Ref Range    Sodium 143 136 - 145 mmol/L    Potassium 4.6 3.5 - 5.0 mmol/L    Chloride 107 98 - 107 mmol/L    CO2 26 22 - 31 mmol/L    Anion Gap, Calculation 10 5 - 18 mmol/L    Glucose 134 (H) 70 - 125 mg/dL    BUN 29 (H) 8 - 28 mg/dL    Creatinine 1.44 (H) 0.60 - 1.10 mg/dL    GFR MDRD Af Amer 42 (L) >60 mL/min/1.73m2    GFR MDRD Non Af Amer 35 (L) >60 mL/min/1.73m2    Bilirubin, Total 0.4 0.0 - 1.0 mg/dL    Calcium 8.9 8.5 - 10.5 mg/dL    Protein, Total 5.4 (L) 6.0 - 8.0 g/dL    Albumin 3.6 3.5 - 5.0 g/dL    Alkaline Phosphatase 114 45 - 120 U/L    AST 14 0 - 40 U/L    ALT 15 0 - 45 U/L    Narrative    Fasting Glucose reference range is 70-99 mg/dL per  American Diabetes Association (ADA) guidelines.   HM2(CBC w/o Differential)   Result Value Ref Range    WBC 8.2 4.0 - 11.0 thou/uL     RBC 4.30 3.80 - 5.40 mill/uL    Hemoglobin 12.5 12.0 - 16.0 g/dL    Hematocrit 38.7 35.0 - 47.0 %    MCV 90 80 - 100 fL    MCH 29.0 27.0 - 34.0 pg    MCHC 32.2 32.0 - 36.0 g/dL    RDW 14.0 11.0 - 14.5 %    Platelets 265 140 - 440 thou/uL    MPV 7.6 7.0 - 10.0 fL   Lipid Cascade   Result Value Ref Range    Cholesterol 165 <=199 mg/dL    Triglycerides 125 <=149 mg/dL    HDL Cholesterol 52 >=50 mg/dL    LDL Calculated 88 <=129 mg/dL    Patient Fasting > 8hrs? Yes    Vitamin D, Total (25-Hydroxy)   Result Value Ref Range    Vitamin D, Total (25-Hydroxy) 32.2 30.0 - 80.0 ng/mL    Narrative    Deficiency <10.0 ng/mL  Insufficiency 10.0-29.9 ng/mL  Sufficiency 30.0-80.0 ng/mL  Toxicity (possible) >100.0 ng/mL   Uric Acid   Result Value Ref Range    Uric Acid 2.8 2.0 - 7.5 mg/dL       Your diabetes is very well controlled.  All of your other blood tests look good and are stable for you    Please call with questions or contact us using [employer].    Sincerely,        Electronically signed by Dar Wooten MD

## 2021-06-22 NOTE — PROGRESS NOTES
ASSESSMENT:  1. Type 2 diabetes mellitus with stage 3 chronic kidney disease, without long-term current use of insulin (H)  Doing well.  A1c best it has been in a while, and no hypoglycemia  - Glycosylated Hemoglobin A1c  - JIC RED  - JIC LAV  - sitaGLIPtin (JANUVIA) 100 MG tablet; Take 1 tablet (100 mg total) by mouth daily. From patient assistance program. Do not refill.  Dispense: 90 tablet; Refill: 3    2. Essential hypertension with goal blood pressure less than 140/90  Stable despite cessation of losartan and Spironolactone.  Edema slightly worse  - JIC RED  - JIC LAV  - Basic Metabolic Panel    3. Anxiety  Lorazepam use increasing.  Increase dose of Abilify  - Drugs of Abuse 1,Urine  - LORazepam (ATIVAN) 1 MG tablet; Take 1 tablet  (1MG) by mouth twice daily as needed for anxiety  Dispense: 30 tablet; Refill: 1  - ARIPiprazole (ABILIFY) 5 MG tablet; Take 1 tablet (5 mg total) by mouth daily.  Dispense: 90 tablet; Refill: 3    4. Encounter for therapeutic drug level monitoring   - Drugs of Abuse 1,Urine        PLAN:  Patient Instructions   Increase the Abilify to a full pill every day for the anxiety    Diabetes is very well controlled    Refill lorazepam    januvia prescription          Orders Placed This Encounter   Procedures     Glycosylated Hemoglobin A1c     JIC RED     JIC LAV     Basic Metabolic Panel     Drugs of Abuse 1,Urine     Medications Discontinued During This Encounter   Medication Reason     LORazepam (ATIVAN) 1 MG tablet Reorder     sitaGLIPtin (JANUVIA) 100 MG tablet Reorder     ARIPiprazole (ABILIFY) 5 MG tablet      sitaGLIPtin (JANUVIA) 100 MG tablet Reorder       Return in about 4 months (around 4/28/2019) for for a full review of medications and lab testing.    CHIEF COMPLAINT:  Chief Complaint   Patient presents with     Dizziness       HISTORY OF PRESENT ILLNESS:  Lupe is a 78 y.o. female presenting to the clinic today for follow-up blood pressure.  At our last visit 2 months ago  "losartan and Spironolactone were discontinued.  With this her peripheral edema has slightly worsened however blood pressure has been stable    Diabetes has been good without hypoglycemia.  She receives Januvia through compassionate use    She is tolerating a beta-blocker.  She denies any worsening of her asthma symptoms    REVIEW OF SYSTEMS:   Worsening anxiety requiring lorazepam once to twice daily.  Excellent sleep.  No chest pain.  No trouble breathing.  Peripheral edema slightly worsened.  All other systems are negative.    PFSH:  She had a very good [place].  She lives at home with her  Nino    TOBACCO USE:  Social History     Tobacco Use   Smoking Status Former Smoker     Last attempt to quit: 1960     Years since quittin.5   Smokeless Tobacco Never Used       VITALS:  Vitals:    18 1416   BP: 123/50   Patient Site: Left Arm   Patient Position: Sitting   Cuff Size: Adult Regular   Pulse: 92   Resp: 14   SpO2: 94%   Weight: 190 lb 1.6 oz (86.2 kg)   Height: 5' 2\" (1.575 m)     Wt Readings from Last 3 Encounters:   18 190 lb 1.6 oz (86.2 kg)   18 180 lb (81.6 kg)   18 180 lb (81.6 kg)     Body mass index is 34.77 kg/m .    PHYSICAL EXAM:  Constitutional:  Reveals a pleasant energetic elderly woman who ambulates with a cane.  Vitals:  Per nursing notes.    Cardiac:  rate and rhythm without murmurs, rubs, or gallops.     Legs trace edema over the sock line  Palpation of the radial pulse irregular.  Lungs: Clear.  Respiratory effort normal.  No wheezes    Neurologic:  Cranial nerves II-XII intact.     Psychiatric:  Mood appropriate, memory intact.       ADDITIONAL HISTORY SUMMARIZED (2): Reviewed hospital discharge summaries from the [facility] EVERYWHERE/ EXTRA INFORMATION (1): None.   RADIOLOGY TESTS (1):   LABS (1): Updated today  CARDIOLOGY/MEDICINE TESTS (1): Echo shows ejection fraction 65%  INDEPENDENT REVIEW (2 each): None.     Total data points:4    Time in: " 4:04 PM  Time out: 4:19 PM    The visit lasted a total of 15 minutes face to face with the patient. Over 50% of the time was spent counseling and educating the patient about medications, medication adjustments, medication side effects, and lab testing.        MEDICATIONS:  Current Outpatient Medications   Medication Sig Dispense Refill     albuterol (PROAIR HFA) 90 mcg/actuation inhaler Inhale 2 puffs every 6 (six) hours as needed for wheezing. 1 Inhaler 3     allopurinol (ZYLOPRIM) 300 MG tablet TAKE 1 TABLET(300 MG) BY MOUTH DAILY 90 tablet 3     ARIPiprazole (ABILIFY) 5 MG tablet Take 1 tablet (5 mg total) by mouth daily. 90 tablet 3     aspirin 81 MG EC tablet Take 1 tablet (81 mg total) by mouth daily.  0     atorvastatin (LIPITOR) 10 MG tablet Take 1 tablet (10 mg total) by mouth daily. 90 tablet 3     blood glucose test (ACCU-CHEK OLY PLUS TEST STRP) strips TEST THREE TIMES DAILY 300 strip 3     buPROPion (WELLBUTRIN SR) 100 MG 12 hr tablet Take 1 tablet (100 mg total) by mouth daily. 90 tablet 3     calcium citrate (CALCITRATE) 200 mg (950 mg) tablet Take 1 tablet by mouth 2 (two) times a day.       cholecalciferol, vitamin D3, 5,000 unit Tab Take 5,000 Units by mouth daily.        cyanocobalamin (VITAMIN B-12) 500 MCG tablet Take 500 mcg by mouth daily.       docusate sodium (COLACE) 50 MG capsule Take 100 mg by mouth daily as needed for constipation.       ferrous sulfate 325 (65 FE) MG tablet Take 1 tablet (325 mg total) by mouth 3 (three) times a day with meals.  0     FLUoxetine (PROZAC) 20 MG capsule Take 1 capsule (20 mg total) by mouth 2 (two) times a day. 180 capsule 3     fluticasone-salmeterol (ADVAIR DISKUS) 500-50 mcg/dose DISKUS Inhale 1 puff 2 (two) times a day. 3 each 3     furosemide (LASIX) 40 MG tablet Take 2 tablets (80 mg total) by mouth daily. 90 tablet 3     generic lancets (FINGERSTIX LANCETS) Use 1 each As Directed 3 (three) times a day. Dispense brand per patient's insurance at  pharmacy discretion. 300 each 3     glipiZIDE (GLUCOTROL) 5 MG tablet Take 1 tablet (5 mg total) by mouth daily before breakfast. 90 tablet 3     ipratropium-albuterol (DUO-NEB) 0.5-2.5 mg/3 mL nebulizer Take 3 mL by nebulization every 6 (six) hours as needed.        LORazepam (ATIVAN) 1 MG tablet Take 1 tablet  (1MG) by mouth twice daily as needed for anxiety 30 tablet 1     metFORMIN (GLUCOPHAGE) 500 MG tablet Take 2 tablets (1,000 mg total) by mouth 2 (two) times a day with meals. 360 tablet 3     metoprolol succinate (TOPROL-XL) 50 MG 24 hr tablet Take 1 tablet (50 mg total) by mouth at bedtime. 90 tablet 3     nitroglycerin (NITROSTAT) 0.4 MG SL tablet Place 0.4 mg under the tongue every 5 (five) minutes as needed for chest pain.       OMEGA-3/DHA/EPA/FISH OIL (FISH OIL-OMEGA-3 FATTY ACIDS) 300-1,000 mg capsule Take 1 g by mouth daily.        omeprazole (PRILOSEC) 20 MG capsule TAKE 1 CAPSULE(20 MG) BY MOUTH TWICE DAILY 180 capsule 3     OXYGEN-AIR DELIVERY SYSTEMS MISC 2 L/min into each nostril as needed (with activity). Indications: use with activity to maintain sats > 90%       potassium chloride (K-DUR,KLOR-CON) 20 MEQ tablet TAKE 1 TABLET(20 MEQ) BY MOUTH DAILY 90 tablet 3     sitaGLIPtin (JANUVIA) 100 MG tablet Take 1 tablet (100 mg total) by mouth daily. From patient assistance program. Do not refill. 90 tablet 3     vitamin E 400 unit capsule Take 400 Units by mouth daily.       No current facility-administered medications for this visit.

## 2021-06-22 NOTE — TELEPHONE ENCOUNTER
"Attention: Sebas Moncada [facility] MGMT Pharmacist    Who is calling:  Patient wants to know if \"the Junuvia rx got sent into the company.\" She wanted to leave a message for Sebas Moncada.   Reason for Call:  See above   Date of last appointment with primary care: N/A  Has the patient been recently seen:  N/A  Okay to leave a detailed message: Yes    "

## 2021-06-22 NOTE — TELEPHONE ENCOUNTER
I mailed in her application for her.  I would recommend that she follow-up with the company either tomorrow or next week.  She can call them at: [phone]

## 2021-06-23 NOTE — TELEPHONE ENCOUNTER
Who is calling:  Sheng with [employer]   Reason for Call:  Patient initiated PA today for Lorazepam, writer relayed information requested.   Date of last appointment with primary care:  12/18/18  Has the patient been recently seen:  Yes  Okay to leave a detailed message: Yes    PA approved [id number] 12/23/18-1/22/2020

## 2021-06-23 NOTE — TELEPHONE ENCOUNTER
Controlled Substance Refill Request  Medication:   Requested Prescriptions     Pending Prescriptions Disp Refills     LORazepam (ATIVAN) 1 MG tablet [Pharmacy Med Name: LORAZEPAM 1MG TABLETS] 30 tablet 0     Sig: TAKE 1 TABLET(1 MG) BY MOUTH TWICE DAILY AS NEEDED FOR ANXIETY     Date Last Fill: 12/28/18  Pharmacy: [id number]   Submit electronically to pharmacy  Controlled Substance Agreement on File:   Encounter-Level CSA Scan Date - 08/04/2016:    Scan on 8/5/2016  1:41 PM (below)         Last office visit: Last office visit pertaining to requested medication was 12/28/18.

## 2021-06-24 NOTE — TELEPHONE ENCOUNTER
I spoke with Lupe. I did document that I mailed in her application on 1/3/19. Will complete application and she will come in for signature in the next day or so.     Application to Dr. Wooten for signature. Application then should be brought to  to await patient signature. Please return to Hannah after this is done, to ensure the forms are complete and will be mailed.

## 2021-06-24 NOTE — TELEPHONE ENCOUNTER
Fax received from [facility] pharmacy requesting Prior Authorization    Medication Name: Omeprazole 20mg capsule BID    Insurance Plan:    [facility] Phone Number: [phone]   Patient ID Number: [id number]    Please start PA process

## 2021-06-24 NOTE — TELEPHONE ENCOUNTER
Who is calling:  Patient  Reason for Call:   Patient states the company never received the form.  Patient would like to come to the clinic to pick that up.  ASAP    Regarding the Januvia  Date of last appointment with primary care:   12/31/2018  Okay to leave a detailed message: Yes  [phone]    Patient is looking to get this taken care of today would like to pick this up at the [place] [facility].

## 2021-06-24 NOTE — TELEPHONE ENCOUNTER
Central PA team  [phone]  Pool: HE PA MED ([id number])          PA has been initiated.       PA form completed and faxed insurance via Cover My Meds     Key:   [id number]     Medication:  Lorazepam 1MG      Insurance:  [employer]         Response will be received via fax and may take up to 5-10 business days depending on plan

## 2021-06-24 NOTE — TELEPHONE ENCOUNTER
PA APPROVED:    Approval start date:01/22/2019  Approval end date:01/22/2020    Pharmacy has been notified of approval and will contact patient when medication is ready for pickup.

## 2021-06-24 NOTE — TELEPHONE ENCOUNTER
Fax received from [facility] pharmacy requesting Prior Authorization    Medication Name: Lorazepam 0.5mg tablets    Insurance Plan:    PBM: [employer]   Patient ID Number: [id number]    Please start PA process for Qty Limit

## 2021-06-24 NOTE — TELEPHONE ENCOUNTER
Central PA team  [phone]  Pool: HE PA MED ([id number])          PA has been initiated.       PA form completed and faxed insurance via Cover My Meds     Key:  [id number]       Medication:  Omeprazole 20MG OR CPDR      Insurance: [employer]           Response will be received via fax and may take up to 5-10 business days depending on plan

## 2021-06-25 NOTE — TELEPHONE ENCOUNTER
RN cannot approve Refill Request    RN can NOT refill this medication med is not covered by policy/route to provider. Last office visit: 11/20/2020 Dar Wooten MD Last Physical: 9/14/2017 Last MTM visit: Visit date not found Last visit same specialty: Visit date not found.  Next visit within 3 mo: Visit date not found  Next physical within 3 mo: Visit date not found      Francisca Reardon, [facility] Triage/Med Refill 6/10/2021    Requested Prescriptions   Pending Prescriptions Disp Refills     buPROPion (WELLBUTRIN SR) 100 MG 12 hr tablet 180 tablet 1     Sig: Take 1 tablet (100 mg total) by mouth 2 (two) times a day.       There is no refill protocol information for this order

## 2021-06-27 NOTE — PROGRESS NOTES
Progress Notes by Sebas Moncada, PharmD at 8/26/2019 10:30 AM     Author: Sebas Moncada, PharmD Service: -- Author Type: Pharmacist    Filed: 8/26/2019 12:38 PM Encounter Date: 8/26/2019 Status: Signed    : Sebas Moncada, PharmD (Pharmacist)       [facility] Initial Encounter  Assessment & Plan                                                     1. Anxiety  Improving per patient report now that emotional triggers are subsiding.  Reviewed that in addition to her current medication regimen she may consider meeting with our therapist, Diogo, here at the [place] [facility].  She would prefer to discuss this with her  and get back to us.  She would also like a B12 injection today as previously ordered by Dr. Wooten to help with energy levels.  This will be completed by staff.  -Consider referral to therapist, Diogo Whitley, patient to discuss with her   -administer IM B12 today     2. Type 2 diabetes mellitus with stage 3 chronic kidney disease, without long-term current use of insulin (H)  At goal A1c less than 8% per ADA guidelines, tolerating current regimen without adverse effects.  In December we will resubmit her application for patient assistance program through [employer] for Januvia.    3. Elevated uric acid in blood  Uric acid level is very low, under 5, this has seemed to help over time with her chronic arthritis.  Recommend continue current dose of allopurinol.  In the future may consider titrating down to 100 mg daily and reassess as long as her uric acid level is below 5.  -Consider decreasing allopurinol to 100 mg daily, reassess at follow-up    4. Chronic obstructive pulmonary disease, unspecified COPD type (H)  Nonadherent to maintenance Advair, encouraged her to increase this to 1 puff twice daily to help prevent exacerbations.  Stable with infrequent use of short acting beta agonist, however requires a refill.  She does still have access to DuoNeb as her nebulizer solution.  -Increased  frequency: Fluticasone propion-salmeterol (ADVAIR DISKUS) 500-50 mcg/dose DISKUS; Inhale 1 puff 2 (two) times a day.  Dispense: 3 each; Refill: 3  -Refill albuterol (PROAIR HFA) 90 mcg/actuation inhaler; Inhale 2 puffs every 6 (six) hours as needed for wheezing.  Dispense: 1 Inhaler; Refill: 3    5. Chronic combined systolic and diastolic congestive heart failure (H)  Stable. Recommended to continue current regimen.     6.  Health maintenance  Normal vitamin D level, recommend to continue 1000 units daily.  Discussed the benefits of goal calcium daily of 1200 mg, between diet and supplements.  Encouraged her to obtain calcium citrate, she was previously on a daily PPI, and may be resumed in the future, as this will be better absorbed while she is taking omeprazole.  Also encouraged her to have her DEXA scan completed.  -Consider follow-up DEXA scan, to discuss with PCP  -Start taking calcium citrate 1 tab twice daily    Follow Up  Return in about 3 months (around 11/26/2019) for with PCP.      Subjective & Objective                                                     Lupe Hill is a 79 y.o. female coming in for an initial visit for Medication Therapy Management. She was referred to me from Dar Wooten MD .  She presents today with all of her medication bottles.    Chief Complaint: Medication management    Medication Adherence/Access: Stable per patient report, she continues to have slight confusion about her medications, but feels comfortable managing them at home.      Anxiety: She has had more stress in the last few months with her family.  Her daughter was in the hospital and had to have surgery and now has had a complication.  She is starting to feel that these events are calming down a little bit.  Notes that she continues to take lorazepam 1 mg twice daily as needed, this use has not changed.  There are about 3 days/week where she does not require lorazepam.  She has decreased her dose of Abilify  "down to 2 mg daily, notes that her diarrhea has resolved.  She has also appropriately discontinued vitamin D and fish oil.  She would like to get her B12 injection today as well she continues to suffer from some fatigue.       Type 2 diabetes: In July she got her next refill of Januvia, in the middle of December we will be due for resubmitting paperwork for her patient assistance program for Januvia which continues to be very effective and tolerated in addition to glipizide and metformin.  Lab Results   Component Value Date    HGBA1C 6.0 06/10/2019    HGBA1C 5.5 2019    HGBA1C 6.3 (H) 2018     Lab Results   Component Value Date    MICROALBUR 1.90 2018    LDLCALC 77 2016    CREATININE 1.33 (H) 2019     Arthritis: no pain at this time. \"Feels wonderful.\"  We had previously discussed decreasing her dose of allopurinol to 100 mg daily, this was not discussed today.  Lab Results   Component Value Date    URICACID 2.9 2017     COPD: She only uses her Advair once daily in the evening, she does have somewhat of a cough today.  Notes that she rarely uses her albuterol but does have this in her purse with her at all times.  She also has her nebulizer at home with medication in case this is needed.    CHF: Volume stable, continues on 80 mg daily dose of furosemide.    Healthcare Maintenance: not taking calcium supplement at this time, and gets about 1 serving of calcium containing foods daily.   Vitamin D, Total (25-Hydroxy)   Date Value Ref Range Status   07/10/2017 38.7 30.0 - 80.0 ng/mL Final     PMH: reviewed in EPIC   Allergies/ADRs: reviewed in EPIC   Alcohol: None  Tobacco:   Social History     Tobacco Use   Smoking Status Former Smoker   ? Last attempt to quit: 1960   ? Years since quittin.2   Smokeless Tobacco Never Used     Today's Vitals:   Vitals:    19 1104   BP: 137/59   Pulse: 70   Weight: 187 lb (84.8 kg)     ----------------    Much or all of the text in this " note was generated through the use of Dragon Dictate voice-to-text software. Errors in spelling or words which seem out of context are unintentional. Sound alike errors, in particular, may have escaped editing.    The patient was given a summary of these recommendations as an after visit summary    I spent 30 minutes with this patient today.   All changes were made via collaborative practice agreement with Dar Wooten MD. A copy of the visit note was provided to the patient's provider.     Sebas Moncada, PharmD, BCACP  Medication Management (MTM) Pharmacist  Critical access hospital & [facility]         Current Outpatient Medications   Medication Sig Dispense Refill   ? albuterol (PROAIR HFA) 90 mcg/actuation inhaler Inhale 2 puffs every 6 (six) hours as needed for wheezing. 1 Inhaler 3   ? allopurinol (ZYLOPRIM) 300 MG tablet TAKE 1 TABLET(300 MG) BY MOUTH DAILY 90 tablet 0   ? ARIPiprazole (ABILIFY) 2 MG tablet Take 1 tablet (2 mg total) by mouth daily. 90 tablet 3   ? aspirin 81 MG EC tablet Take 1 tablet (81 mg total) by mouth daily.  0   ? atorvastatin (LIPITOR) 10 MG tablet Take 1 tablet (10 mg total) by mouth daily. 90 tablet 3   ? blood glucose test (ACCU-CHEK OLY PLUS TEST STRP) strips TEST THREE TIMES DAILY 300 strip 3   ? buPROPion (WELLBUTRIN SR) 100 MG 12 hr tablet Take 1 tablet (100 mg total) by mouth 2 (two) times a day. 180 tablet 3   ? calcium citrate (CALCITRATE) 200 mg (950 mg) tablet Take 1 tablet by mouth 2 (two) times a day.     ? cholecalciferol, vitamin D3, 5,000 unit Tab Take 1,000 Units by mouth daily.           ? famotidine (PEPCID) 20 MG tablet TAKE 1 TABLET(20 MG) BY MOUTH TWICE DAILY 180 tablet 1   ? FLUoxetine (PROZAC) 20 MG capsule Take 1 capsule (20 mg total) by mouth 2 (two) times a day. 180 capsule 3   ? fluticasone propion-salmeterol (ADVAIR DISKUS) 500-50 mcg/dose DISKUS Inhale 1 puff 2 (two) times a day. 3 each 3   ? furosemide (LASIX) 40 MG tablet Take 2  tablets (80 mg total) by mouth daily. 180 tablet 3   ? glipiZIDE (GLUCOTROL) 5 MG tablet Take 1 tablet (5 mg total) by mouth daily before breakfast. 90 tablet 3   ? ipratropium-albuterol (DUO-NEB) 0.5-2.5 mg/3 mL nebulizer Take 3 mL by nebulization every 6 (six) hours as needed.      ? LORazepam (ATIVAN) 1 MG tablet TAKE 1 TABLET BY MOUTH TWICE DAILY AS NEEDED FOR ANXIETY 30 tablet 0   ? metFORMIN (GLUCOPHAGE) 500 MG tablet Take 1 tablet (500 mg total) by mouth 2 (two) times a day with meals. 180 tablet 3   ? metoprolol succinate (TOPROL-XL) 50 MG 24 hr tablet Take 1 tablet (50 mg total) by mouth at bedtime. 90 tablet 3   ? nitroglycerin (NITROSTAT) 0.4 MG SL tablet Place 0.4 mg under the tongue every 5 (five) minutes as needed for chest pain.     ? OXYGEN-AIR DELIVERY SYSTEMS MISC 2 L/min into each nostril as needed (with activity). Indications: use with activity to maintain sats > 90%     ? potassium chloride (K-DUR,KLOR-CON) 20 MEQ tablet TAKE 1 TABLET(20 MEQ) BY MOUTH DAILY 90 tablet 3   ? sitaGLIPtin (JANUVIA) 100 MG tablet Take 1 tablet (100 mg total) by mouth daily. From patient assistance program. Do not refill. 90 tablet 3     Current Facility-Administered Medications   Medication Dose Route Frequency Provider Last Rate Last Dose   ? cyanocobalamin injection 1,000 mcg  1,000 mcg Intramuscular Q30 Days Dar Wooten MD   1,000 mcg at 08/26/19 9016

## 2021-07-14 PROBLEM — I50.9 CHF EXACERBATION (H): Status: RESOLVED | Noted: 2018-06-05 | Resolved: 2018-06-13

## 2021-07-14 PROBLEM — J96.22 ACUTE AND CHRONIC RESPIRATORY FAILURE WITH HYPERCAPNIA (H): Status: RESOLVED | Noted: 2018-10-15 | Resolved: 2018-11-02

## 2021-07-14 PROBLEM — J45.901 ASTHMA EXACERBATION: Status: RESOLVED | Noted: 2017-07-29 | Resolved: 2017-09-14

## 2021-07-21 ENCOUNTER — RECORDS - HEALTHEAST (OUTPATIENT)
Dept: ADMINISTRATIVE | Facility: CLINIC | Age: 81
End: 2021-07-21

## 2021-08-03 PROBLEM — S72.001A CLOSED RIGHT HIP FRACTURE, INITIAL ENCOUNTER (H): Status: RESOLVED | Noted: 2020-03-17 | Resolved: 2020-11-20

## 2021-10-10 ENCOUNTER — HEALTH MAINTENANCE LETTER (OUTPATIENT)
Age: 81
End: 2021-10-10

## 2021-11-26 DIAGNOSIS — I10 ESSENTIAL HYPERTENSION: ICD-10-CM

## 2021-11-27 NOTE — TELEPHONE ENCOUNTER
"Routing refill request to provider for review/approval because:  Patient needs to be seen because it has been more than 1 year since last office visit.    Last Written Prescription Date:  2/21/21  Last Fill Quantity: 90,  # refills: 2   Last office visit provider:  6/18/21     Requested Prescriptions   Pending Prescriptions Disp Refills     metoprolol succinate ER (TOPROL-XL) 50 MG 24 hr tablet 90 tablet 2       Beta-Blockers Protocol Failed - 11/26/2021 10:54 AM        Failed - Recent (12 mo) or future (30 days) visit within the authorizing provider's specialty     Patient has had an office visit with the authorizing provider or a provider within the authorizing providers department within the previous 12 mos or has a future within next 30 days. See \"Patient Info\" tab in inbasket, or \"Choose Columns\" in Meds & Orders section of the refill encounter.              Passed - Blood pressure under 140/90 in past 12 months     BP Readings from Last 3 Encounters:   06/18/21 122/64   11/20/20 128/78   11/03/20 134/75                 Passed - Patient is age 6 or older        Passed - Medication is active on med list             tete diaz RN 11/27/21 5:21 AM  "

## 2021-11-28 RX ORDER — METOPROLOL SUCCINATE 50 MG/1
TABLET, EXTENDED RELEASE ORAL
Qty: 90 TABLET | Refills: 2 | Status: SHIPPED | OUTPATIENT
Start: 2021-11-28

## 2021-12-08 DIAGNOSIS — E87.6 HYPOKALEMIA DUE TO LOSS OF POTASSIUM: ICD-10-CM

## 2021-12-08 DIAGNOSIS — I10 ESSENTIAL HYPERTENSION: ICD-10-CM

## 2021-12-09 RX ORDER — POTASSIUM CHLORIDE 1500 MG/1
TABLET, EXTENDED RELEASE ORAL
Qty: 90 TABLET | Refills: 0 | Status: SHIPPED | OUTPATIENT
Start: 2021-12-09

## 2022-01-29 ENCOUNTER — HEALTH MAINTENANCE LETTER (OUTPATIENT)
Age: 82
End: 2022-01-29

## 2022-07-16 ENCOUNTER — HEALTH MAINTENANCE LETTER (OUTPATIENT)
Age: 82
End: 2022-07-16

## 2022-08-26 NOTE — PROGRESS NOTES
ASSESSMENT:  1. Controlled type 2 diabetes mellitus without complication, without long-term current use of insulin  Update labs now off glimepiride  - Glycosylated Hemoglobin A1c  - [facility] RED    2. Anxiety state  Improved although Prozac dose did not remain at 60.  Addition of bupropion at 150 caused tremors but 100 seems to be improving symptoms.  Daughter agrees that her mood is better  - FLUoxetine (PROZAC) 20 MG capsule; Take 2 capsules (40 mg total) by mouth daily.  Dispense: 180 capsule; Refill: 3    3. Anemia  Last hemoglobin low.  Last workup several years ago.  Update labs and treat accordingly  - Comprehensive Metabolic Panel  - Vitamin B12  - Ferritin  - Erythrocyte Sedimentation Rate  - Iron and Transferrin Iron Binding Capacity    4. Fatigue, unspecified type  Could be persisting anemia.  Raspy voice could suggest thyroid.  Doubt side effect of adding bupropion    If no obvious explanation or improvement, consider trial of Adderall or Ritalin  - Thyroid Stimulating Hormone (TSH)  - HM2(CBC w/o Differential)    5. Primary osteoarthritis involving multiple joints  Rule out coexisting uric acid arthropathy  - Uric Acid  - Vitamin D, Total (25-Hydroxy)    6. Thrush of mouth and esophagus  After antibiotics and with use of inhaler.  Raspy voice could be acid reflux versus thrush.  Recommend treatment      7.  Shortness of breath.  Stable per patient and worse per daughter.  Resume omeprazole, which daughter stopped a few months ago and monitor effect on breathing and throat    PLAN:  Patient Instructions   A very common reason for a raspy voice and throat clearing and trouble breathing is stomach acid splashing the back of the throat and vocal cords    2 years ago you were anemic from low iron    Resume the omeprazole 20 mgs twice a day for 2 month trial    Yeast infection of mouth and throat and esophagus   Fluconazole 100 mgs once a day for 2 weeks for yeast    Sometimes, if we cannot find a reason for  fatigue, we try low dose stimulants          Total data points: 3. Total time:  32 minutes.     Orders Placed This Encounter   Procedures     Glycosylated Hemoglobin A1c     [facility] RED     Comprehensive Metabolic Panel     Thyroid Stimulating Hormone (TSH)     HM2(CBC w/o Differential)     Uric Acid     Vitamin D, Total (25-Hydroxy)     Vitamin B12     Ferritin     Erythrocyte Sedimentation Rate     Iron and Transferrin Iron Binding Capacity     Medications Discontinued During This Encounter   Medication Reason     JANUVIA 100 mg tablet Duplicate order     FLUoxetine (PROZAC) 20 MG capsule Reorder       Return in about 4 weeks (around 8/4/2017).    CHIEF COMPLAINT:  Chief Complaint   Patient presents with     Follow-up     Med check      Fatigue     Patient states she is tired all the time       HISTORY OF PRESENT ILLNESS:  Lupe is a 77 y.o. female presenting to the clinic today, accompanied by her daughter,  for follow up on anxiety.   She has been taking 40 mg of Prozac and began taking 100 mg of bupropion a few months ago. 60 mg of Prozac caused GI upset, so she reduced her dose to 40 mg.  150 mg of bupropion made her feel shaky and so she reduced it to 100 mg.  She states that her mood has been good. She takes lorazepam 1 mg about once ever month when she feels especially anxious. Her anxiety has been under better control by her estimation.     Fatigue:   She has been feeling tired for the past few weeks. Her daughter, however, thinks that she has been appearing less energized since [place]. Her daughter has also noticed that she appears somewhat short of breath with activity. She recently had a sleep study that did not demonstrate sleep apnea.     Diabetes:  She takes metformin 500 mg three times daily, 100 mg of Januvia daily, 30 mg of Actos daily. She stopped glimepiride about 3 month ago and thinks that her blood glucose levels have remained well controlled.  Her daughter reports that she has been a bit  nauseous. Of note, she reports that last year she had an eye exam.     Hypertension:  She takes Imdur 30 mg daily and diltiazem 240 mg daily. Her blood pressure has been well controlled at home when she checks it. She has not noticed any increase in leg swelling. She denies any head aches.     Raspy voice:  Her voice has been raspy due to the increase in humidity. Her daughter has noticed an increase in the hoarseness of her voice over the past 5 months. She is not taking omeprazole. Her daughter has noticed that her voice is raspier in the evening and clearer in the mornings.     REVIEW OF SYSTEMS:   She denies any shortness of breath, except in extreme humidity.  She eats licorice if she feels constipated and this seems to work for her. She denies any acid heart burn, cough, sinus pressure, or choking. She is not taking iron.   All other systems are negative.    PFSH:  She regularly cleans her home. She cooks her own meals.     TOBACCO USE:  History   Smoking Status     Former Smoker     Quit date: 6/19/1960   Smokeless Tobacco     Not on file       VITALS:  Vitals:    07/07/17 1625   BP: 118/64   Pulse: 81   Temp: 98.6  F (37  C)   TempSrc: Temporal   SpO2: 95%   Weight: 203 lb 11.2 oz (92.4 kg)     Wt Readings from Last 3 Encounters:   07/07/17 203 lb 11.2 oz (92.4 kg)   03/13/17 212 lb 8 oz (96.4 kg)   01/26/17 207 lb 14.4 oz (94.3 kg)       PHYSICAL EXAM:  Constitutional:  Reveals an alert, pleasant, elderly woman with a raspy voice. She appeared less anxious.  Vitals:  Per nursing notes.  Oropharynx:  Without posterior nasal drainage or thrush.  Neck:  Supple, thyroid not palpable.  Cardiac:  Regular rate and rhythm without murmurs, rubs, or gallops.  1+ edema left ankle, no edema on the right. Palpation of the radial pulse regular.  Lungs: Clear.  Respiratory effort normal.  Neurologic:  Cranial nerves II-XII intact.     Psychiatric:  Mood appropriate, memory intact.     QUALITY MEASURES:  The following are  part of a depression follow up plan for the patient:  mental health care management and taking history of mental health management    ADDITIONAL HISTORY SUMMARIZED (2): Nurse triage notes from 3/17/2017 reviewed regarding bupropion and anxiety.  DECISION TO OBTAIN EXTRA INFORMATION (1): None.   RADIOLOGY TESTS (1): None.  LABS (1): Labs ordered.   MEDICINE TESTS (1): None.  INDEPENDENT REVIEW (2 each): None.     The visit lasted a total of 32 minutes face to face with the patient. Over 50% of the time was spent counseling and educating the patient about anxiety and fatigue.    IEthan, am scribing for and in the presence of, Dr. Wooten.    I, Dr. Wooten, personally performed the services described in this documentation, as scribed by Ethan Hoskins in my presence, and it is both accurate and complete.    MEDICATIONS:  Current Outpatient Prescriptions   Medication Sig Dispense Refill     ACCU-CHEK OLY PLUS TEST STRP Strp TEST THREE TIMES DAILY 300 strip 13     ADVAIR DISKUS 500-50 mcg/dose DISKUS INHALE 1 PUFF BY MOUTH TWICE DAILY 60 each 6     albuterol (PROAIR HFA) 90 mcg/actuation inhaler Inhale 2 puffs every 6 (six) hours as needed for wheezing. 1 Inhaler 11     aspirin 81 MG EC tablet Take 81 mg by mouth daily.       atorvastatin (LIPITOR) 10 MG tablet Take 1 tablet (10 mg total) by mouth daily. 90 tablet 3     buPROPion (WELLBUTRIN SR) 100 MG 12 hr tablet Take 1 tablet (100 mg total) by mouth Daily at 8:00 am.. 90 tablet 3     cyanocobalamin (VITAMIN B-12) 500 MCG tablet Take 500 mcg by mouth daily.       diltiazem (DILACOR XR) 240 MG 24 hr capsule Take 1 capsule (240 mg total) by mouth daily. 90 capsule 3     docusate sodium (COLACE) 50 MG capsule Take 100 mg by mouth daily as needed for constipation.       FLUoxetine (PROZAC) 20 MG capsule Take 2 capsules (40 mg total) by mouth daily. 180 capsule 3     fluticasone-salmeterol (ADVAIR DISKUS) 500-50 mcg/dose DISKUS Inhale 1 puff daily. 3 each 3      furosemide (LASIX) 40 MG tablet TAKE 1 TABLET BY MOUTH DAILY 90 tablet 3     ipratropium-albuterol (DUO-NEB) 0.5-2.5 mg/3 mL nebulizer NEBULIZE ONE VIAL BY MOUTH FOUR TIMES DAILY 1080 mL 0     isosorbide mononitrate (IMDUR) 30 MG 24 hr tablet TAKE ONE TABLET BY MOUTH DAILY 90 tablet 3     JANUVIA 100 mg tablet TAKE 1 TABLET BY MOUTH DAILY 90 tablet 1     LORazepam (ATIVAN) 1 MG tablet TAKE 1 TABLET BY MOUTH TWICE DAILY AS NEEDED 60 tablet 0     metFORMIN (GLUCOPHAGE) 500 MG tablet TAKE 1 TABLET BY MOUTH THREE TIMES DAILY 270 tablet 3     naproxen (NAPROSYN) 500 MG tablet Take 1 tablet (500 mg total) by mouth daily. 90 tablet 3     nitroglycerin (NITROSTAT) 0.4 MG SL tablet ONE TABLET UNDER TONGUE AS NEEDED FOR CHEST PAIN EVERY 5 MINUTES AS NEEDED 90 tablet 0     OMEGA-3/DHA/EPA/FISH OIL (FISH OIL-OMEGA-3 FATTY ACIDS) 300-1,000 mg capsule Take 2 g by mouth daily.       omeprazole (PRILOSEC) 20 MG capsule Take 1 capsule (20 mg total) by mouth 2 (two) times a day. 180 capsule 3     pioglitazone (ACTOS) 30 MG tablet TAKE 1 TABLET BY MOUTH DAILY 90 tablet 3     potassium chloride SA (K-DUR,KLOR-CON) 20 MEQ tablet Take 1 tablet (20 mEq total) by mouth daily. 90 tablet 1     sitaGLIPtin (JANUVIA) 100 MG tablet Take 1 tablet (100 mg total) by mouth daily. 90 tablet 3     fluconazole (DIFLUCAN) 100 MG tablet Take 1 tablet (100 mg total) by mouth daily for 14 days. 14 tablet 0     No current facility-administered medications for this visit.        Total data points: 3  TIME IN:4:57 PM  TIME OUT:5:29 PM       Yes

## 2022-09-18 ENCOUNTER — HEALTH MAINTENANCE LETTER (OUTPATIENT)
Age: 82
End: 2022-09-18

## 2023-05-07 ENCOUNTER — HEALTH MAINTENANCE LETTER (OUTPATIENT)
Age: 83
End: 2023-05-07

## 2023-07-30 ENCOUNTER — HEALTH MAINTENANCE LETTER (OUTPATIENT)
Age: 83
End: 2023-07-30

## 2023-12-17 ENCOUNTER — HEALTH MAINTENANCE LETTER (OUTPATIENT)
Age: 83
End: 2023-12-17
